# Patient Record
Sex: MALE | Race: BLACK OR AFRICAN AMERICAN | NOT HISPANIC OR LATINO | URBAN - METROPOLITAN AREA
[De-identification: names, ages, dates, MRNs, and addresses within clinical notes are randomized per-mention and may not be internally consistent; named-entity substitution may affect disease eponyms.]

---

## 2017-05-15 ENCOUNTER — INPATIENT (INPATIENT)
Facility: HOSPITAL | Age: 57
LOS: 1 days | Discharge: HOME | DRG: 639 | End: 2017-05-17
Attending: HOSPITALIST | Admitting: HOSPITALIST
Payer: COMMERCIAL

## 2017-05-15 VITALS
TEMPERATURE: 97 F | OXYGEN SATURATION: 100 % | SYSTOLIC BLOOD PRESSURE: 134 MMHG | DIASTOLIC BLOOD PRESSURE: 85 MMHG | HEART RATE: 70 BPM | RESPIRATION RATE: 20 BRPM | HEIGHT: 66 IN | WEIGHT: 194.01 LBS

## 2017-05-15 LAB
ACETONE SERPL-MCNC: ABNORMAL
ALBUMIN SERPL ELPH-MCNC: 4.3 G/DL — SIGNIFICANT CHANGE UP (ref 3.3–5.2)
ALP SERPL-CCNC: 69 U/L — SIGNIFICANT CHANGE UP (ref 40–120)
ALT FLD-CCNC: 21 U/L — SIGNIFICANT CHANGE UP
ANION GAP SERPL CALC-SCNC: 16 MMOL/L — SIGNIFICANT CHANGE UP (ref 5–17)
APPEARANCE UR: CLEAR — SIGNIFICANT CHANGE UP
AST SERPL-CCNC: 26 U/L — SIGNIFICANT CHANGE UP
BASE EXCESS BLDV CALC-SCNC: -4.7 MMOL/L — LOW (ref -3–3)
BASOPHILS # BLD AUTO: 0 K/UL — SIGNIFICANT CHANGE UP (ref 0–0.2)
BASOPHILS NFR BLD AUTO: 0.4 % — SIGNIFICANT CHANGE UP (ref 0–2)
BILIRUB SERPL-MCNC: 1.9 MG/DL — SIGNIFICANT CHANGE UP (ref 0.4–2)
BILIRUB UR-MCNC: NEGATIVE — SIGNIFICANT CHANGE UP
BUN SERPL-MCNC: 43 MG/DL — HIGH (ref 8–20)
CALCIUM SERPL-MCNC: 9.7 MG/DL — SIGNIFICANT CHANGE UP (ref 8.6–10.2)
CHLORIDE SERPL-SCNC: 93 MMOL/L — LOW (ref 98–107)
CO2 SERPL-SCNC: 19 MMOL/L — LOW (ref 22–29)
COLOR SPEC: YELLOW — SIGNIFICANT CHANGE UP
CREAT SERPL-MCNC: 1.47 MG/DL — HIGH (ref 0.5–1.3)
DIFF PNL FLD: NEGATIVE — SIGNIFICANT CHANGE UP
EOSINOPHIL # BLD AUTO: 0.1 K/UL — SIGNIFICANT CHANGE UP (ref 0–0.5)
EOSINOPHIL NFR BLD AUTO: 1.8 % — SIGNIFICANT CHANGE UP (ref 0–5)
GAS PNL BLDV: SIGNIFICANT CHANGE UP
GLUCOSE SERPL-MCNC: 612 MG/DL — CRITICAL HIGH (ref 70–115)
GLUCOSE UR QL: 1000 MG/DL
HCO3 BLDV-SCNC: 20 MMOL/L — SIGNIFICANT CHANGE UP (ref 20–26)
HCT VFR BLD CALC: 37.4 % — LOW (ref 42–52)
HGB BLD-MCNC: 14 G/DL — SIGNIFICANT CHANGE UP (ref 14–18)
KETONES UR-MCNC: ABNORMAL
LEUKOCYTE ESTERASE UR-ACNC: NEGATIVE — SIGNIFICANT CHANGE UP
LYMPHOCYTES # BLD AUTO: 1.2 K/UL — SIGNIFICANT CHANGE UP (ref 1–4.8)
LYMPHOCYTES # BLD AUTO: 22.2 % — SIGNIFICANT CHANGE UP (ref 20–55)
MCHC RBC-ENTMCNC: 30.8 PG — SIGNIFICANT CHANGE UP (ref 27–31)
MCHC RBC-ENTMCNC: 37.4 G/DL — HIGH (ref 32–36)
MCV RBC AUTO: 82.4 FL — SIGNIFICANT CHANGE UP (ref 80–94)
MONOCYTES # BLD AUTO: 0.2 K/UL — SIGNIFICANT CHANGE UP (ref 0–0.8)
MONOCYTES NFR BLD AUTO: 4.5 % — SIGNIFICANT CHANGE UP (ref 3–10)
NEUTROPHILS # BLD AUTO: 3.9 K/UL — SIGNIFICANT CHANGE UP (ref 1.8–8)
NEUTROPHILS NFR BLD AUTO: 70.7 % — SIGNIFICANT CHANGE UP (ref 37–73)
NITRITE UR-MCNC: NEGATIVE — SIGNIFICANT CHANGE UP
NT-PROBNP SERPL-SCNC: 47 PG/ML — SIGNIFICANT CHANGE UP (ref 0–300)
PCO2 BLDV: 49 MMHG — SIGNIFICANT CHANGE UP (ref 35–50)
PH BLDV: 7.27 — LOW (ref 7.35–7.45)
PH UR: 5 — SIGNIFICANT CHANGE UP (ref 5–8)
PLATELET # BLD AUTO: 139 K/UL — LOW (ref 150–400)
PO2 BLDV: 44 MMHG — SIGNIFICANT CHANGE UP (ref 25–45)
POTASSIUM SERPL-MCNC: 3.7 MMOL/L — SIGNIFICANT CHANGE UP (ref 3.5–5.3)
POTASSIUM SERPL-MCNC: 6.3 MMOL/L — CRITICAL HIGH (ref 3.5–5.3)
POTASSIUM SERPL-SCNC: 3.7 MMOL/L — SIGNIFICANT CHANGE UP (ref 3.5–5.3)
POTASSIUM SERPL-SCNC: 6.3 MMOL/L — CRITICAL HIGH (ref 3.5–5.3)
PROT SERPL-MCNC: 7.6 G/DL — SIGNIFICANT CHANGE UP (ref 6.6–8.7)
PROT UR-MCNC: NEGATIVE MG/DL — SIGNIFICANT CHANGE UP
RBC # BLD: 4.54 M/UL — LOW (ref 4.6–6.2)
RBC # FLD: 13.1 % — SIGNIFICANT CHANGE UP (ref 11–15.6)
SAO2 % BLDV: 70 % — SIGNIFICANT CHANGE UP (ref 67–88)
SODIUM SERPL-SCNC: 128 MMOL/L — LOW (ref 135–145)
SP GR SPEC: 1.01 — SIGNIFICANT CHANGE UP (ref 1.01–1.02)
TROPONIN T SERPL-MCNC: <0.01 NG/ML — SIGNIFICANT CHANGE UP (ref 0–0.06)
UROBILINOGEN FLD QL: NEGATIVE MG/DL — SIGNIFICANT CHANGE UP
WBC # BLD: 5.5 K/UL — SIGNIFICANT CHANGE UP (ref 4.8–10.8)
WBC # FLD AUTO: 5.5 K/UL — SIGNIFICANT CHANGE UP (ref 4.8–10.8)

## 2017-05-15 PROCEDURE — 93010 ELECTROCARDIOGRAM REPORT: CPT

## 2017-05-15 PROCEDURE — 71020: CPT | Mod: 26

## 2017-05-15 RX ORDER — INSULIN HUMAN 100 [IU]/ML
8 INJECTION, SOLUTION SUBCUTANEOUS ONCE
Qty: 0 | Refills: 0 | Status: COMPLETED | OUTPATIENT
Start: 2017-05-15 | End: 2017-05-15

## 2017-05-15 RX ORDER — METHOCARBAMOL 500 MG/1
1500 TABLET, FILM COATED ORAL ONCE
Qty: 0 | Refills: 0 | Status: COMPLETED | OUTPATIENT
Start: 2017-05-15 | End: 2017-05-15

## 2017-05-15 RX ORDER — SODIUM CHLORIDE 9 MG/ML
2000 INJECTION INTRAMUSCULAR; INTRAVENOUS; SUBCUTANEOUS ONCE
Qty: 0 | Refills: 0 | Status: COMPLETED | OUTPATIENT
Start: 2017-05-15 | End: 2017-05-15

## 2017-05-15 RX ORDER — INSULIN HUMAN 100 [IU]/ML
10 INJECTION, SOLUTION SUBCUTANEOUS ONCE
Qty: 0 | Refills: 0 | Status: COMPLETED | OUTPATIENT
Start: 2017-05-15 | End: 2017-05-15

## 2017-05-15 RX ORDER — SODIUM CHLORIDE 9 MG/ML
200 INJECTION INTRAMUSCULAR; INTRAVENOUS; SUBCUTANEOUS ONCE
Qty: 0 | Refills: 0 | Status: COMPLETED | OUTPATIENT
Start: 2017-05-15 | End: 2017-05-15

## 2017-05-15 RX ADMIN — METHOCARBAMOL 1500 MILLIGRAM(S): 500 TABLET, FILM COATED ORAL at 22:17

## 2017-05-15 RX ADMIN — INSULIN HUMAN 10 UNIT(S): 100 INJECTION, SOLUTION SUBCUTANEOUS at 20:27

## 2017-05-15 RX ADMIN — SODIUM CHLORIDE 100 MILLILITER(S): 9 INJECTION INTRAMUSCULAR; INTRAVENOUS; SUBCUTANEOUS at 20:30

## 2017-05-15 RX ADMIN — INSULIN HUMAN 8 UNIT(S): 100 INJECTION, SOLUTION SUBCUTANEOUS at 22:40

## 2017-05-15 NOTE — ED PROVIDER NOTE - PROGRESS NOTE DETAILS
I spoke with Dr. Lombardi re: patient who confirms that patient was on metformin in the past but it was discontinued as his A1cs have persistently been in the low 5's. I was able to obtain copies of old EKGs on MUSE and patient has persistent TWI on prior EKGs.. Plan to still repeat pt with elevated glucose mild dka hospitlist requested critical are to see pt accepted to them

## 2017-05-15 NOTE — ED PROVIDER NOTE - OBJECTIVE STATEMENT
56M with h/o diabetes previously on metformin but none x 2 years due to improvement, pw 4 days of weakness, sent from Dr. lombardi's office after FSG read "HI" and patient found to have ketones in urine.  He denies fevers/chills. Has a slight tickle in the throat s/p polyp removal recently ( benign), and denies abdominal pain/ vomiting/diarrhea. 56M with h/o diabetes previously on metformin but none x 2 years due to improvement, pw 4 days of weakness, sent from Dr. lombardi's office after FSG read "HI" and patient found to have ketones in urine.  He denies fevers/chills. Has a slight tickle in the throat s/p polyp removal recently ( benign), and denies abdominal pain/ vomiting/diarrhea.  Patient denies any current SOB but does note new onset STEVENS for the last week mostly with vigorous activity. NO travel/ LE edema 56M with h/o diabetes previously on metformin but none x 2 years due to improvement, pw 4 days of weakness, sent from Dr. lombardi's office after FSG read "HI" and patient found to have ketones in urine.  He denies fevers/chills. Has a slight tickle in the throat s/p polyp removal recently ( benign), and denies abdominal pain/ vomiting/diarrhea.  Patient denies any current SOB but does note new onset STEVENS for the last week mostly with vigorous activity. NO travel/ LE edema.  Pt reports recent cardiac clearance for polypectomy with DR. Granados in [place].

## 2017-05-15 NOTE — ED ADULT TRIAGE NOTE - CHIEF COMPLAINT QUOTE
Patient arrived to ED today from Dr. Lombardi's office.  Dr. Lombardi called and stated that patient is SOB, weak, hyperglycemic, has polyuria, + Ketones in his blood, and the in office blood sugar read HI indicating and blood sugar of over 500.

## 2017-05-15 NOTE — ED ADULT NURSE NOTE - OBJECTIVE STATEMENT
Patient states that he was at Dr. Lombardis office and was sent here because they checked his sugar and it read HI. He states that he went to the Drs office originally because he wasn't feeling well. He states that he has had shortness of breath with exertion and he has been unable to do the activities that he has normally been able to do. Respirations even and unlabored at this time, lung sounds clear bilat, patient denies any complaints of chest pain at this time.

## 2017-05-16 DIAGNOSIS — Z29.9 ENCOUNTER FOR PROPHYLACTIC MEASURES, UNSPECIFIED: ICD-10-CM

## 2017-05-16 DIAGNOSIS — R94.31 ABNORMAL ELECTROCARDIOGRAM [ECG] [EKG]: ICD-10-CM

## 2017-05-16 DIAGNOSIS — E87.6 HYPOKALEMIA: ICD-10-CM

## 2017-05-16 DIAGNOSIS — E11.65 TYPE 2 DIABETES MELLITUS WITH HYPERGLYCEMIA: ICD-10-CM

## 2017-05-16 DIAGNOSIS — E13.10 OTHER SPECIFIED DIABETES MELLITUS WITH KETOACIDOSIS WITHOUT COMA: ICD-10-CM

## 2017-05-16 DIAGNOSIS — N18.3 CHRONIC KIDNEY DISEASE, STAGE 3 (MODERATE): ICD-10-CM

## 2017-05-16 LAB
ACETONE SERPL-MCNC: ABNORMAL
ANION GAP SERPL CALC-SCNC: 11 MMOL/L — SIGNIFICANT CHANGE UP (ref 5–17)
ANION GAP SERPL CALC-SCNC: 13 MMOL/L — SIGNIFICANT CHANGE UP (ref 5–17)
ANION GAP SERPL CALC-SCNC: 16 MMOL/L — SIGNIFICANT CHANGE UP (ref 5–17)
BASE EXCESS BLDV CALC-SCNC: -6.4 MMOL/L — LOW (ref -3–3)
BUN SERPL-MCNC: 31 MG/DL — HIGH (ref 8–20)
BUN SERPL-MCNC: 37 MG/DL — HIGH (ref 8–20)
BUN SERPL-MCNC: 37 MG/DL — HIGH (ref 8–20)
CA-I SERPL-SCNC: 1.16 MMOL/L — SIGNIFICANT CHANGE UP (ref 1.15–1.29)
CALCIUM SERPL-MCNC: 7.7 MG/DL — LOW (ref 8.6–10.2)
CALCIUM SERPL-MCNC: 8 MG/DL — LOW (ref 8.6–10.2)
CALCIUM SERPL-MCNC: 8.4 MG/DL — LOW (ref 8.6–10.2)
CHLORIDE BLDV-SCNC: 108 MMOL/L — HIGH (ref 98–106)
CHLORIDE SERPL-SCNC: 100 MMOL/L — SIGNIFICANT CHANGE UP (ref 98–107)
CHLORIDE SERPL-SCNC: 100 MMOL/L — SIGNIFICANT CHANGE UP (ref 98–107)
CHLORIDE SERPL-SCNC: 105 MMOL/L — SIGNIFICANT CHANGE UP (ref 98–107)
CO2 SERPL-SCNC: 19 MMOL/L — LOW (ref 22–29)
CO2 SERPL-SCNC: 19 MMOL/L — LOW (ref 22–29)
CO2 SERPL-SCNC: 21 MMOL/L — LOW (ref 22–29)
CREAT SERPL-MCNC: 1.57 MG/DL — HIGH (ref 0.5–1.3)
CREAT SERPL-MCNC: 1.59 MG/DL — HIGH (ref 0.5–1.3)
CREAT SERPL-MCNC: 1.74 MG/DL — HIGH (ref 0.5–1.3)
CULTURE RESULTS: NO GROWTH — SIGNIFICANT CHANGE UP
GAS PNL BLDV: 134 MMOL/L — LOW (ref 136–146)
GAS PNL BLDV: SIGNIFICANT CHANGE UP
GAS PNL BLDV: SIGNIFICANT CHANGE UP
GLUCOSE BLDV-MCNC: 222 MG/DL — HIGH (ref 70–99)
GLUCOSE SERPL-MCNC: 139 MG/DL — HIGH (ref 70–115)
GLUCOSE SERPL-MCNC: 226 MG/DL — HIGH (ref 70–115)
GLUCOSE SERPL-MCNC: 350 MG/DL — HIGH (ref 70–115)
HBA1C BLD-MCNC: 10.9 % — HIGH (ref 4–5.6)
HCO3 BLDV-SCNC: 19 MMOL/L — LOW (ref 20–26)
HCT VFR BLDA CALC: 35 — LOW (ref 39–50)
HGB BLD CALC-MCNC: 11.4 G/DL — LOW (ref 13–17)
LACTATE BLDV-MCNC: 1.8 MMOL/L — SIGNIFICANT CHANGE UP (ref 0.5–2)
MAGNESIUM SERPL-MCNC: 2.3 MG/DL — SIGNIFICANT CHANGE UP (ref 1.6–2.6)
MAGNESIUM SERPL-MCNC: 2.4 MG/DL — SIGNIFICANT CHANGE UP (ref 1.6–2.6)
OTHER CELLS CSF MANUAL: 10 ML/DL — LOW (ref 18–22)
PCO2 BLDV: 45 MMHG — SIGNIFICANT CHANGE UP (ref 35–50)
PH BLDV: 7.27 — LOW (ref 7.35–7.45)
PHOSPHATE SERPL-MCNC: 2.9 MG/DL — SIGNIFICANT CHANGE UP (ref 2.4–4.7)
PHOSPHATE SERPL-MCNC: 3.6 MG/DL — SIGNIFICANT CHANGE UP (ref 2.4–4.7)
PO2 BLDV: 44 MMHG — SIGNIFICANT CHANGE UP (ref 25–45)
POTASSIUM BLDV-SCNC: 4.2 MMOL/L — SIGNIFICANT CHANGE UP (ref 3.4–4.5)
POTASSIUM SERPL-MCNC: 3.3 MMOL/L — LOW (ref 3.5–5.3)
POTASSIUM SERPL-MCNC: 3.9 MMOL/L — SIGNIFICANT CHANGE UP (ref 3.5–5.3)
POTASSIUM SERPL-MCNC: 4.4 MMOL/L — SIGNIFICANT CHANGE UP (ref 3.5–5.3)
POTASSIUM SERPL-SCNC: 3.3 MMOL/L — LOW (ref 3.5–5.3)
POTASSIUM SERPL-SCNC: 3.9 MMOL/L — SIGNIFICANT CHANGE UP (ref 3.5–5.3)
POTASSIUM SERPL-SCNC: 4.4 MMOL/L — SIGNIFICANT CHANGE UP (ref 3.5–5.3)
SAO2 % BLDV: 68 % — SIGNIFICANT CHANGE UP (ref 67–88)
SODIUM SERPL-SCNC: 132 MMOL/L — LOW (ref 135–145)
SODIUM SERPL-SCNC: 135 MMOL/L — SIGNIFICANT CHANGE UP (ref 135–145)
SODIUM SERPL-SCNC: 137 MMOL/L — SIGNIFICANT CHANGE UP (ref 135–145)
SPECIMEN SOURCE: SIGNIFICANT CHANGE UP

## 2017-05-16 PROCEDURE — 99233 SBSQ HOSP IP/OBS HIGH 50: CPT

## 2017-05-16 PROCEDURE — 99285 EMERGENCY DEPT VISIT HI MDM: CPT

## 2017-05-16 RX ORDER — SODIUM CHLORIDE 9 MG/ML
1000 INJECTION, SOLUTION INTRAVENOUS
Qty: 0 | Refills: 0 | Status: DISCONTINUED | OUTPATIENT
Start: 2017-05-16 | End: 2017-05-16

## 2017-05-16 RX ORDER — INSULIN GLARGINE 100 [IU]/ML
15 INJECTION, SOLUTION SUBCUTANEOUS ONCE
Qty: 0 | Refills: 0 | Status: COMPLETED | OUTPATIENT
Start: 2017-05-16 | End: 2017-05-16

## 2017-05-16 RX ORDER — INSULIN GLARGINE 100 [IU]/ML
22 INJECTION, SOLUTION SUBCUTANEOUS ONCE
Qty: 0 | Refills: 0 | Status: DISCONTINUED | OUTPATIENT
Start: 2017-05-16 | End: 2017-05-16

## 2017-05-16 RX ORDER — GLUCAGON INJECTION, SOLUTION 0.5 MG/.1ML
1 INJECTION, SOLUTION SUBCUTANEOUS ONCE
Qty: 0 | Refills: 0 | Status: DISCONTINUED | OUTPATIENT
Start: 2017-05-16 | End: 2017-05-17

## 2017-05-16 RX ORDER — INSULIN LISPRO 100/ML
VIAL (ML) SUBCUTANEOUS
Qty: 0 | Refills: 0 | Status: DISCONTINUED | OUTPATIENT
Start: 2017-05-16 | End: 2017-05-17

## 2017-05-16 RX ORDER — SODIUM CHLORIDE 9 MG/ML
1000 INJECTION, SOLUTION INTRAVENOUS ONCE
Qty: 0 | Refills: 0 | Status: COMPLETED | OUTPATIENT
Start: 2017-05-16 | End: 2017-05-16

## 2017-05-16 RX ORDER — POTASSIUM CHLORIDE 20 MEQ
40 PACKET (EA) ORAL ONCE
Qty: 0 | Refills: 0 | Status: COMPLETED | OUTPATIENT
Start: 2017-05-16 | End: 2017-05-16

## 2017-05-16 RX ORDER — DEXTROSE 50 % IN WATER 50 %
25 SYRINGE (ML) INTRAVENOUS ONCE
Qty: 0 | Refills: 0 | Status: DISCONTINUED | OUTPATIENT
Start: 2017-05-16 | End: 2017-05-17

## 2017-05-16 RX ORDER — INSULIN GLARGINE 100 [IU]/ML
22 INJECTION, SOLUTION SUBCUTANEOUS EVERY MORNING
Qty: 0 | Refills: 0 | Status: DISCONTINUED | OUTPATIENT
Start: 2017-05-17 | End: 2017-05-17

## 2017-05-16 RX ORDER — DEXTROSE 50 % IN WATER 50 %
12.5 SYRINGE (ML) INTRAVENOUS ONCE
Qty: 0 | Refills: 0 | Status: DISCONTINUED | OUTPATIENT
Start: 2017-05-16 | End: 2017-05-17

## 2017-05-16 RX ORDER — SODIUM CHLORIDE 9 MG/ML
1000 INJECTION, SOLUTION INTRAVENOUS
Qty: 0 | Refills: 0 | Status: DISCONTINUED | OUTPATIENT
Start: 2017-05-16 | End: 2017-05-17

## 2017-05-16 RX ORDER — INSULIN HUMAN 100 [IU]/ML
7 INJECTION, SOLUTION SUBCUTANEOUS
Qty: 100 | Refills: 0 | Status: DISCONTINUED | OUTPATIENT
Start: 2017-05-16 | End: 2017-05-16

## 2017-05-16 RX ORDER — INSULIN LISPRO 100/ML
VIAL (ML) SUBCUTANEOUS AT BEDTIME
Qty: 0 | Refills: 0 | Status: DISCONTINUED | OUTPATIENT
Start: 2017-05-16 | End: 2017-05-17

## 2017-05-16 RX ORDER — PANTOPRAZOLE SODIUM 20 MG/1
40 TABLET, DELAYED RELEASE ORAL DAILY
Qty: 0 | Refills: 0 | Status: DISCONTINUED | OUTPATIENT
Start: 2017-05-16 | End: 2017-05-16

## 2017-05-16 RX ORDER — DEXTROSE 50 % IN WATER 50 %
1 SYRINGE (ML) INTRAVENOUS ONCE
Qty: 0 | Refills: 0 | Status: DISCONTINUED | OUTPATIENT
Start: 2017-05-16 | End: 2017-05-17

## 2017-05-16 RX ORDER — KETOROLAC TROMETHAMINE 30 MG/ML
30 SYRINGE (ML) INJECTION ONCE
Qty: 0 | Refills: 0 | Status: DISCONTINUED | OUTPATIENT
Start: 2017-05-16 | End: 2017-05-16

## 2017-05-16 RX ORDER — ENOXAPARIN SODIUM 100 MG/ML
40 INJECTION SUBCUTANEOUS EVERY 24 HOURS
Qty: 0 | Refills: 0 | Status: DISCONTINUED | OUTPATIENT
Start: 2017-05-16 | End: 2017-05-17

## 2017-05-16 RX ADMIN — Medication 40 MILLIEQUIVALENT(S): at 13:56

## 2017-05-16 RX ADMIN — Medication 4: at 22:23

## 2017-05-16 RX ADMIN — Medication 30 MILLIGRAM(S): at 00:42

## 2017-05-16 RX ADMIN — INSULIN HUMAN 3 UNIT(S)/HR: 100 INJECTION, SOLUTION SUBCUTANEOUS at 05:53

## 2017-05-16 RX ADMIN — INSULIN GLARGINE 15 UNIT(S): 100 INJECTION, SOLUTION SUBCUTANEOUS at 05:54

## 2017-05-16 RX ADMIN — SODIUM CHLORIDE 200 MILLILITER(S): 9 INJECTION, SOLUTION INTRAVENOUS at 05:53

## 2017-05-16 RX ADMIN — Medication 30 MILLIGRAM(S): at 00:57

## 2017-05-16 RX ADMIN — SODIUM CHLORIDE 2000 MILLILITER(S): 9 INJECTION, SOLUTION INTRAVENOUS at 07:57

## 2017-05-16 RX ADMIN — ENOXAPARIN SODIUM 40 MILLIGRAM(S): 100 INJECTION SUBCUTANEOUS at 13:56

## 2017-05-16 RX ADMIN — SODIUM CHLORIDE 1000 MILLILITER(S): 9 INJECTION INTRAMUSCULAR; INTRAVENOUS; SUBCUTANEOUS at 00:20

## 2017-05-16 NOTE — ADVANCED PRACTICE NURSE CONSULT - ASSESSMENT
pt is a+ox3 c/o 0 pain resting comfortably in bed. family @ bedside providing comfort. pt states that 2013 he was hospitalized and was started on metformin, but after awhile his dr told him he does nt need it anymore. in the last few months he had very hectic life but the last week he was feeling worst. he saw his dr and was sent to the hospital. pt was educated about the result of hemoglobin a1c and the increased risk of developing long term complications and the importance of adding insulin @ this time. pt does not like the idea of insulin and injection and preferrs pills. explained that @ this time due to the high # there is a need for insulin. pt has a countur glucometer wife agreed to bring pts glucometer to the hospital. pt agreed to draw and inject all insulin doses while in the hospital using insulin syringe and rn supervision.

## 2017-05-16 NOTE — H&P ADULT - NSHPREVIEWOFSYSTEMS_GEN_ALL_CORE
Review of Systems:  CONSTITUTIONAL: No fever, chills, (+) fatigue  EYES: No eye pain, visual disturbances, or discharge  ENMT:  No difficulty hearing, tinnitus, vertigo; No sinus or throat pain  NECK: No pain or stiffness  RESPIRATORY: No cough, wheezing, chills or hemoptysis; No shortness of breath  CARDIOVASCULAR: No chest pain, palpitations, dizziness, or leg swelling  GASTROINTESTINAL: (+)  abdominal. No nausea, vomiting, or hematemesis; No diarrhea or constipation. No melena or hematochezia.  GENITOURINARY: (+) polyuria, No  dysuria, hematuria, or incontinence  NEUROLOGICAL: No headaches, memory loss, loss of strength, numbness, or tremors  SKIN: No itching, burning, rashes, or lesions   MUSCULOSKELETAL: No joint pain or swelling; No muscle, back, or extremity pain (+) Leg cramping   PSYCHIATRIC: No depression, anxiety, mood swings, or difficulty sleeping

## 2017-05-16 NOTE — ED ADULT NURSE REASSESSMENT NOTE - NS ED NURSE REASSESS COMMENT FT1
ICU Satinder Mobley Love at patients bedside
patient began complaining of cramping to bilateral lower legs, crawling on floor and unable to sit still. Dr. Luque made aware
patient c/o pain to his lower legs. States that he feels that they are cramping again. Dr. Lorenzana made aware will put order for medication

## 2017-05-16 NOTE — PROGRESS NOTE ADULT - PROBLEM SELECTOR PLAN 1
Long acting insulin with Lantus started  Endocrine Consult  Diabetes Educator consult  Accuchecks AC/HS  ISS  Consistent carb diet

## 2017-05-16 NOTE — PROGRESS NOTE ADULT - SUBJECTIVE AND OBJECTIVE BOX
CC: Sent to ED by PMD for High blood glucose and urine dip stick positive for ketones (16 May 2017 05:50)    HPI:  56 year old male PMHx HTN, DM2. Pt had been on Metformin up to 2 years ago and was taken off by PMD for improving blood sugars secondary to diet and exercise.  Pt States over the past week - week and a half was tiring easily, experiencing increased thirst,  increased urination and weight loss.  Be began having cramps in feet and legs and tonight with weakness and abdominal pain which he saw PMD.  PMD  found his Finger stick was too high to register and had Ketone positive urine.  Pt denies any N/V SOB or CP. (16 May 2017 05:50)    INTERVAL HPI/OVERNIGHT EVENTS: Abdominal cramps, improved, some numbness B/L fingers/hands. Now off insulin drip.    Vital Signs Last 24 Hrs  T(C): 36.4, Max: 36.6 ( @ 02:39)  T(F): 97.5, Max: 97.9 ( @ 02:39)  HR: 65 (48 - 70)  BP: 114/74 (95/54 - 134/85)  BP(mean): 88 (69 - 88)  RR: 21 (13 - 28)  SpO2: 100% (99% - 100%)  I&O's Detail  I & Os for 24h ending 16 May 2017 07:00  =============================================    CM: no events    IN:    dextrose 5% + lactated ringers.: 350 ml    insulin Infusion: 10 ml    Total IN: 360 ml  ---------------------------------------------  OUT:    Voided: 250 ml    Total OUT: 250 ml  ---------------------------------------------  Total NET: 110 ml    I & Os for current day (as of 16 May 2017 14:22)  =============================================  IN:    0.9% NaCl: 1000 ml    dextrose 5% + lactated ringers.: 900 ml    insulin Infusion: 15 ml    insulin Infusion: 3 ml    Total IN: 1918 ml  ---------------------------------------------  OUT:    Voided: 400 ml    Total OUT: 400 ml  ---------------------------------------------  Total NET: 1518 ml      CARDIAC MARKERS ( 15 May 2017 20:17 )  x     / <0.01 ng/mL / x     / x     / x                                14.0   5.5   )-----------( 139      ( 15 May 2017 20:17 )             37.4     16 May 2017 12:18    137    |  105    |  31.0   ----------------------------<  139    3.3     |  21.0   |  1.57     Ca    7.7        16 May 2017 12:18  Phos  2.9       16 May 2017 12:18  Mg     2.4       16 May 2017 12:18    TPro  7.6    /  Alb  4.3    /  TBili  1.9    /  DBili  x      /  AST  26     /  ALT  21     /  AlkPhos  69     15 May 2017 20:17      CAPILLARY BLOOD GLUCOSE  94 (16 May 2017 13:00)  108 (16 May 2017 12:00)  148 (16 May 2017 11:00)  227 (16 May 2017 10:00)  271 (16 May 2017 09:00)  308 (16 May 2017 08:00)  310 (16 May 2017 07:00)  295 (16 May 2017 06:11)  304 (16 May 2017 02:39)  234 (15 May 2017 23:41)  345 (15 May 2017 21:39)    LIVER FUNCTIONS - ( 15 May 2017 20:17 )  Alb: 4.3 g/dL / Pro: 7.6 g/dL / ALK PHOS: 69 U/L / ALT: 21 U/L / AST: 26 U/L / GGT: x           Urinalysis Basic - ( 15 May 2017 20:35 )    Color: Yellow / Appearance: Clear / S.010 / pH: x  Gluc: x / Ketone: Trace  / Bili: Negative / Urobili: Negative mg/dL   Blood: x / Protein: Negative mg/dL / Nitrite: Negative   Leuk Esterase: Negative / RBC: x / WBC x   Sq Epi: x / Non Sq Epi: x / Bacteria: x      Hemoglobin A1C, Whole Blood: 10.9 % ( @ 07:10)    MEDICATIONS  (STANDING):  enoxaparin Injectable 40milliGRAM(s) SubCutaneous every 24 hours  insulin glargine Injectable (LANTUS) 22Unit(s) SubCutaneous once  insulin lispro (HumaLOG) corrective regimen sliding scale  SubCutaneous three times a day before meals  insulin lispro (HumaLOG) corrective regimen sliding scale  SubCutaneous at bedtime  dextrose 5%. 1000milliLiter(s) IV Continuous <Continuous>  dextrose 50% Injectable 12.5Gram(s) IV Push once  dextrose 50% Injectable 25Gram(s) IV Push once  dextrose 50% Injectable 25Gram(s) IV Push once    MEDICATIONS  (PRN):  dextrose Gel 1Dose(s) Oral once PRN Blood Glucose LESS THAN 70 milliGRAM(s)/deciLiter  glucagon  Injectable 1milliGRAM(s) IntraMuscular once PRN Glucose <70 milliGRAM(s)/deciLiter      RADIOLOGY & ADDITIONAL TESTS:    ROS: + abdominal cramping, +muscle cramping, +numbness/tingling upper extremities   Constitutional, Eyes, ENT, Cardiovascular, Respiratory,  Genitourinary, Integumentary, Psychiatric, Endocrine, Heme/Lymph are otherwise negative.    Physical Exam: Physical Examination:    General: No acute distress.  Alert, oriented, interactive, nonfocal    HEENT: Pupils equal, reactive to light.  Symmetric.    PULM: Clear to auscultation bilaterally, no significant sputum production    CVS: Regular rate and rhythm, no murmurs, rubs, or gallops    ABD: Soft, nondistended, mild - tenderness, normoactive bowel sounds, no masses    EXT: No edema, nontender    SKIN: Warm and well perfused, no rashes noted.

## 2017-05-16 NOTE — H&P ADULT - PROBLEM SELECTOR PLAN 1
Insulin infusion until anion gap <= 12  Long acting insulin with Lantus   IVF Buffered [id number]   Serial lytes   NPO   GI / DVT PPX

## 2017-05-16 NOTE — H&P ADULT - NSHPPHYSICALEXAM_GEN_ALL_CORE
Physical Examination:    General: No acute distress.  Alert, oriented, interactive, nonfocal    HEENT: Pupils equal, reactive to light.  Symmetric.    PULM: Clear to auscultation bilaterally, no significant sputum production    CVS: Regular rate and rhythm, no murmurs, rubs, or gallops    ABD: Soft, nondistended, mild - tenderness, normoactive bowel sounds, no masses    EXT: No edema, nontender    SKIN: Warm and well perfused, no rashes noted.

## 2017-05-16 NOTE — PROGRESS NOTE ADULT - ASSESSMENT
56 year old male PMHx HTN, DM2. Pt had been on Metformin up to 2 years ago and was taken off by PMD for improving blood sugars secondary to diet and exercise.  Patient reports being admitted for similar episode in 2013 (BG 1500? as per patient) Pt States over the past week - week and a half was tiring easily, experiencing increased thirst,  increased urination and weight loss.  Admitted with DKA, now off insulin drip.

## 2017-05-16 NOTE — H&P ADULT - HISTORY OF PRESENT ILLNESS
56 year old male PMHx HTN, DM2. Pt had been on Metformin up to 2 years ago and was taken off by PMD for improving blood sugars secondary to diet and exercise.  Pt States over the past week - week and a half was tiring easily, experiencing increased thirst,  increased urination and weight loss.  Be began having cramps in feet and legs and tonight with weakness and abdominal pain which he saw PMD.  PMD  found his Finger stick was too high to register and had Ketone positive urine.  Pt denies any N/V SOB or CP.

## 2017-05-16 NOTE — ADVANCED PRACTICE NURSE CONSULT - RECOMMEDATIONS
continue diabetes self management education  pt to draw and inject all insulin doses while in the hospital using insulin syringe and rn supervision  family to bring pts glucometer  c peptide

## 2017-05-16 NOTE — H&P ADULT - ATTENDING COMMENTS
I have seen and examined se    DKA resolved, will need insulin tx endo consult diabetic education  stable fro transfer to the floor  start diet

## 2017-05-17 VITALS
RESPIRATION RATE: 18 BRPM | SYSTOLIC BLOOD PRESSURE: 118 MMHG | TEMPERATURE: 98 F | OXYGEN SATURATION: 100 % | DIASTOLIC BLOOD PRESSURE: 71 MMHG | HEART RATE: 61 BPM

## 2017-05-17 DIAGNOSIS — E10.21 TYPE 1 DIABETES MELLITUS WITH DIABETIC NEPHROPATHY: ICD-10-CM

## 2017-05-17 LAB
ANION GAP SERPL CALC-SCNC: 9 MMOL/L — SIGNIFICANT CHANGE UP (ref 5–17)
BUN SERPL-MCNC: 17 MG/DL — SIGNIFICANT CHANGE UP (ref 8–20)
CALCIUM SERPL-MCNC: 8.9 MG/DL — SIGNIFICANT CHANGE UP (ref 8.6–10.2)
CHLORIDE SERPL-SCNC: 105 MMOL/L — SIGNIFICANT CHANGE UP (ref 98–107)
CO2 SERPL-SCNC: 22 MMOL/L — SIGNIFICANT CHANGE UP (ref 22–29)
CREAT SERPL-MCNC: 1.35 MG/DL — HIGH (ref 0.5–1.3)
GLUCOSE SERPL-MCNC: 292 MG/DL — HIGH (ref 70–115)
HCT VFR BLD CALC: 33.1 % — LOW (ref 42–52)
HGB BLD-MCNC: 11.8 G/DL — LOW (ref 14–18)
MAGNESIUM SERPL-MCNC: 2.1 MG/DL — SIGNIFICANT CHANGE UP (ref 1.6–2.6)
MCHC RBC-ENTMCNC: 30.1 PG — SIGNIFICANT CHANGE UP (ref 27–31)
MCHC RBC-ENTMCNC: 35.6 G/DL — SIGNIFICANT CHANGE UP (ref 32–36)
MCV RBC AUTO: 84.4 FL — SIGNIFICANT CHANGE UP (ref 80–94)
PHOSPHATE SERPL-MCNC: 2.5 MG/DL — SIGNIFICANT CHANGE UP (ref 2.4–4.7)
PLATELET # BLD AUTO: 116 K/UL — LOW (ref 150–400)
POTASSIUM SERPL-MCNC: 4.7 MMOL/L — SIGNIFICANT CHANGE UP (ref 3.5–5.3)
POTASSIUM SERPL-SCNC: 4.7 MMOL/L — SIGNIFICANT CHANGE UP (ref 3.5–5.3)
RBC # BLD: 3.92 M/UL — LOW (ref 4.6–6.2)
RBC # FLD: 13.3 % — SIGNIFICANT CHANGE UP (ref 11–15.6)
SODIUM SERPL-SCNC: 136 MMOL/L — SIGNIFICANT CHANGE UP (ref 135–145)
WBC # BLD: 3.9 K/UL — LOW (ref 4.8–10.8)
WBC # FLD AUTO: 3.9 K/UL — LOW (ref 4.8–10.8)

## 2017-05-17 PROCEDURE — 96374 THER/PROPH/DIAG INJ IV PUSH: CPT

## 2017-05-17 PROCEDURE — 82009 KETONE BODYS QUAL: CPT

## 2017-05-17 PROCEDURE — 93005 ELECTROCARDIOGRAM TRACING: CPT

## 2017-05-17 PROCEDURE — 71046 X-RAY EXAM CHEST 2 VIEWS: CPT

## 2017-05-17 PROCEDURE — 84132 ASSAY OF SERUM POTASSIUM: CPT

## 2017-05-17 PROCEDURE — 96376 TX/PRO/DX INJ SAME DRUG ADON: CPT

## 2017-05-17 PROCEDURE — 87086 URINE CULTURE/COLONY COUNT: CPT

## 2017-05-17 PROCEDURE — 83880 ASSAY OF NATRIURETIC PEPTIDE: CPT

## 2017-05-17 PROCEDURE — 83735 ASSAY OF MAGNESIUM: CPT

## 2017-05-17 PROCEDURE — 83036 HEMOGLOBIN GLYCOSYLATED A1C: CPT

## 2017-05-17 PROCEDURE — 84100 ASSAY OF PHOSPHORUS: CPT

## 2017-05-17 PROCEDURE — 82435 ASSAY OF BLOOD CHLORIDE: CPT

## 2017-05-17 PROCEDURE — 83605 ASSAY OF LACTIC ACID: CPT

## 2017-05-17 PROCEDURE — 80048 BASIC METABOLIC PNL TOTAL CA: CPT

## 2017-05-17 PROCEDURE — 99239 HOSP IP/OBS DSCHRG MGMT >30: CPT

## 2017-05-17 PROCEDURE — 36415 COLL VENOUS BLD VENIPUNCTURE: CPT

## 2017-05-17 PROCEDURE — 96375 TX/PRO/DX INJ NEW DRUG ADDON: CPT

## 2017-05-17 PROCEDURE — 80053 COMPREHEN METABOLIC PANEL: CPT

## 2017-05-17 PROCEDURE — 81003 URINALYSIS AUTO W/O SCOPE: CPT

## 2017-05-17 PROCEDURE — 84484 ASSAY OF TROPONIN QUANT: CPT

## 2017-05-17 PROCEDURE — 82330 ASSAY OF CALCIUM: CPT

## 2017-05-17 PROCEDURE — 93306 TTE W/DOPPLER COMPLETE: CPT

## 2017-05-17 PROCEDURE — 85014 HEMATOCRIT: CPT

## 2017-05-17 PROCEDURE — 82947 ASSAY GLUCOSE BLOOD QUANT: CPT

## 2017-05-17 PROCEDURE — 82803 BLOOD GASES ANY COMBINATION: CPT

## 2017-05-17 PROCEDURE — 84681 ASSAY OF C-PEPTIDE: CPT

## 2017-05-17 PROCEDURE — 84295 ASSAY OF SERUM SODIUM: CPT

## 2017-05-17 PROCEDURE — 85027 COMPLETE CBC AUTOMATED: CPT

## 2017-05-17 PROCEDURE — 99285 EMERGENCY DEPT VISIT HI MDM: CPT | Mod: 25

## 2017-05-17 RX ORDER — INSULIN DETEMIR 100/ML (3)
22 INSULIN PEN (ML) SUBCUTANEOUS
Qty: 2 | Refills: 0
Start: 2017-05-17 | End: 2017-06-16

## 2017-05-17 RX ORDER — INSULIN ASPART 100 [IU]/ML
4 INJECTION, SOLUTION SUBCUTANEOUS
Qty: 2 | Refills: 0
Start: 2017-05-17 | End: 2017-06-16

## 2017-05-17 RX ORDER — INSULIN DETEMIR 100/ML (3)
50 INSULIN PEN (ML) SUBCUTANEOUS
Qty: 0 | Refills: 0 | DISCHARGE
Start: 2017-05-17 | End: 2017-06-16

## 2017-05-17 RX ORDER — INSULIN DETEMIR 100/ML (3)
25 INSULIN PEN (ML) SUBCUTANEOUS
Qty: 0 | Refills: 0 | DISCHARGE
Start: 2017-05-17 | End: 2017-06-16

## 2017-05-17 RX ADMIN — INSULIN GLARGINE 22 UNIT(S): 100 INJECTION, SOLUTION SUBCUTANEOUS at 12:42

## 2017-05-17 RX ADMIN — Medication: at 12:43

## 2017-05-17 RX ADMIN — Medication: at 16:12

## 2017-05-17 RX ADMIN — Medication: at 07:53

## 2017-05-17 NOTE — DISCHARGE NOTE ADULT - CARE PLAN
Principal Discharge DX:	Diabetic ketoacidosis without coma associated with type 1 diabetes mellitus  Goal:	control blood control  Instructions for follow-up, activity and diet:	endocrinology follow up  Secondary Diagnosis:	CKD (chronic kidney disease) stage 3, GFR 30-59 ml/min  Secondary Diagnosis:	Essential hypertension

## 2017-05-17 NOTE — DISCHARGE NOTE ADULT - HOSPITAL COURSE
HPI:  56 year old male PMHx HTN, DM2. Pt had been on Metformin up to 2 years ago and was taken off by PMD for improving blood sugars secondary to diet and exercise.  Pt States over the past week - week and a half was tiring easily, experiencing increased thirst,  increased urination and weight loss.  Be began having cramps in feet and legs and tonight with weakness and abdominal pain which he saw PMD.  PMD  found his Finger stick was too high to register and had Ketone positive urine.  Pt denies any N/V SOB or CP. (16 May 2017 05:50)  patient treated for DKA with IVF and IV insulin drip, and now is doing well and is seen by endocrinology Dr peterson and Sally 22 units once a day and Novolog 4 units with dinner recommended HPI:  56 year old male PMHx HTN, DM2. Pt had been on Metformin up to 2 years ago and was taken off by PMD for improving blood sugars secondary to diet and exercise.  Pt States over the past week - week and a half was tiring easily, experiencing increased thirst,  increased urination and weight loss.  Be began having cramps in feet and legs and tonight with weakness and abdominal pain which he saw PMD.  PMD  found his Finger stick was too high to register and had Ketone positive urine.  Pt denies any N/V SOB or CP. (16 May 2017 05:50)  patient treated for DKA with IVF and IV insulin drip, and now is doing well and is seen by endocrinology Dr peterson and Sally 22 units once a day and Novolog 4 units with dinner recommended . discussed with PMD and endocrine and patient and his wife, he will follow up outpt and with renal for CRF,    GENERAL: NAD, well-groomed, well-developed  HEAD:  Atraumatic, Normocephalic  EYES: EOMI, PERRLA, conjunctiva and sclera clear  ENMT: No tonsillar erythema, exudates, or enlargement; Moist mucous membranes,   NECK: Supple, No JVD, Normal thyroid  NERVOUS SYSTEM:  Alert & Oriented X3, Good concentration; Motor Strength 5/5 B/L upper and lower extremities; CHEST/LUNG: Clear to percussion bilaterally; No rales, rhonchi, wheezing, or rubs  HEART: Regular rate and rhythm; No murmurs, rubs, or gallops  ABDOMEN: Soft, Nontender, Nondistended; Bowel sounds present  EXTREMITIES:  2+ Peripheral Pulses, No clubbing, cyanosis, or edema  LYMPH: No lymphadenopathy noted  SKIN: No rashes or lesions

## 2017-05-17 NOTE — DISCHARGE NOTE ADULT - MEDICATION SUMMARY - MEDICATIONS TO TAKE
I will START or STAY ON the medications listed below when I get home from the hospital:    Levemir 100 units/mL subcutaneous solution  -- 22 unit(s) subcutaneous once a day  -- Check with your doctor before becoming pregnant.  Do not drink alcoholic beverages when taking this medication.  Keep in refrigerator.  Do not freeze.    -- Indication: For Type 1 diabetes mellitus with nephropathy    NovoLOG 100 units/mL subcutaneous solution  -- 4 unit(s) subcutaneous once a day with dinner  -- Do not drink alcoholic beverages when taking this medication.  Keep in refrigerator.  Do not freeze.  Obtain medical advice before taking any non-prescription drugs as some may affect the action of this medication.    -- Indication: For Type 1 diabetes mellitus with nephropathy

## 2017-05-17 NOTE — ADVANCED PRACTICE NURSE CONSULT - RECOMMEDATIONS
continue diabetes self management education  pt to inject all insulin doses while in the hospital using prefilled insulin syringe and rn supervision  please send pt on levemir and novolog flex pens and jose carlos needles  pt needs perscription for countur glucometer continue diabetes self management education  pt to inject all insulin doses while in the hospital using prefilled insulin syringe and rn supervision  please send pt on levemir and novolog flex pens and jose carlos needles  pt needs perscription for countur glucometer  cc- pls task to out pt dwp

## 2017-05-17 NOTE — ADVANCED PRACTICE NURSE CONSULT - ASSESSMENT
return to see pt in am. pt is a+ox3c/o 0 pain resting comfortably in bed family @ bed side providing comfort.pt was educated about the 2 types of insulin he will be taking their different action and schedule. pt and family verbalized understanding. pt was able to return demonstration of insulin pen setting testing dosing and injection successfully, while maintaining hygien. reviewed ss of hypoglycemia and treatment pt verbalized understanding. pt's family brought his glucometer strips were  in . pt was educated about the importance of using strips with in date.

## 2017-05-17 NOTE — DISCHARGE NOTE ADULT - PATIENT PORTAL LINK FT
“You can access the Follow[facility] Patient Portal, offered by [facility], by registering with the following website: [email or web address]”

## 2017-05-17 NOTE — DISCHARGE NOTE ADULT - CARE PROVIDER_API CALL
Paula Ceja (DO), EndocrinologyMetabDiabetes; Internal Medicine  [address] [place] [address]  Phone: [phone]  Fax: [phone]    Lombardi, Adrian C (MD), Family Medicine  [address]  [place] [address]  Phone: [phone]  Fax: [phone]

## 2017-05-17 NOTE — CONSULT NOTE ADULT - ASSESSMENT
56 year old male with history of HTN and T2DM admitted with mild DKA (glucose 612).  He reported symptoms of polyuria, muscle cramps, fatigue with decreased ability to exercise, weight loss of 12-15 pounds over the past 3-4 weeks with glucoses reading "hi" on meter (he was also using  strips).  He was treated with IVF and insulin drip.  History suggestive of late onset T1DM (ABEBE). FS not well controlled but improved and Lantus dose increased today.  Pt is anxious to leave to attend grandson's graduation

## 2017-05-17 NOTE — CONSULT NOTE ADULT - PROBLEM SELECTOR RECOMMENDATION 9
- pt need new Rx for meter and testing supplies - lancets and test strips; he should test before meals and at bedtime  - cont Lantus 22 units (dose increased today)  - suggest Humalog 4 units premeals and cont sliding scale  - I reviewed with pt differences btw T1DM and T2DM.  He understand that at this time insulin is needed for rapid glucose control and to maintain good glycemic control. Cpeptide levels are pending. He agrees to take insulin as outpatient. We discussed risks/benefits of insulin therapy.  As he is travelling to [place] tomorrow - will avoid tight control at this time which can lead to dangerous hypoglycemia.  Outpt regimen: Levemir 22 units in am, Novolog 4 units with dinner, Novolog scale premeals only (151-200 2 units, 201-250 4 units, 251-300 6 units, 301+ 8 units)  - I advised that he follow up with me as outpt  - all questions answered  - more than 50% visit spent counseling pt - pt need new Rx for meter and testing supplies - lancets and test strips; he should test before meals and at bedtime  - cont Lantus 22 units (dose increased today)  - avoid MFN given renal insufficiency  - suggest Humalog 4 units premeals and cont sliding scale  - I reviewed with pt differences btw T1DM and T2DM.  He understand that at this time insulin is needed for rapid glucose control and to maintain good glycemic control. Cpeptide levels are pending. He agrees to take insulin as outpatient. We discussed risks/benefits of insulin therapy.  As he is travelling to [place] tomorrow - will avoid tight control at this time which can lead to dangerous hypoglycemia.  Outpt regimen: Levemir 22 units in am, Novolog 4 units with dinner, Novolog scale premeals only (151-200 2 units, 201-250 4 units, 251-300 6 units, 301+ 8 units)  - I advised that he follow up with me as outpt  - all questions answered  - more than 50% visit spent counseling pt

## 2017-05-18 LAB — C PEPTIDE SERPL-MCNC: 0.7 NG/ML — LOW (ref 0.9–7.1)

## 2017-06-06 PROBLEM — I10 ESSENTIAL (PRIMARY) HYPERTENSION: Chronic | Status: ACTIVE | Noted: 2017-05-15

## 2017-07-17 ENCOUNTER — APPOINTMENT (OUTPATIENT)
Dept: PULMONOLOGY | Facility: CLINIC | Age: 57
End: 2017-07-17

## 2017-07-17 VITALS — RESPIRATION RATE: 16 BRPM

## 2017-07-17 VITALS
HEART RATE: 46 BPM | OXYGEN SATURATION: 100 % | SYSTOLIC BLOOD PRESSURE: 118 MMHG | HEIGHT: 64 IN | BODY MASS INDEX: 33.46 KG/M2 | DIASTOLIC BLOOD PRESSURE: 70 MMHG | WEIGHT: 196 LBS

## 2017-07-17 RX ORDER — AZILSARTAN KAMEDOXOMIL AND CHLORTHALIDONE 40; 12.5 MG/1; MG/1
40-12.5 TABLET ORAL
Refills: 0 | Status: ACTIVE | COMMUNITY

## 2017-07-17 RX ORDER — INSULIN DETEMIR 100 [IU]/ML
100 INJECTION, SOLUTION SUBCUTANEOUS
Refills: 0 | Status: ACTIVE | COMMUNITY

## 2017-08-25 ENCOUNTER — OUTPATIENT (OUTPATIENT)
Dept: OUTPATIENT SERVICES | Facility: HOSPITAL | Age: 57
LOS: 1 days | End: 2017-08-25
Payer: COMMERCIAL

## 2017-08-25 DIAGNOSIS — G47.33 OBSTRUCTIVE SLEEP APNEA (ADULT) (PEDIATRIC): ICD-10-CM

## 2017-08-25 PROCEDURE — 95806 SLEEP STUDY UNATT&RESP EFFT: CPT

## 2017-08-25 PROCEDURE — 95806 SLEEP STUDY UNATT&RESP EFFT: CPT | Mod: 26

## 2017-09-25 ENCOUNTER — APPOINTMENT (OUTPATIENT)
Dept: PULMONOLOGY | Facility: CLINIC | Age: 57
End: 2017-09-25
Payer: COMMERCIAL

## 2017-09-25 VITALS
HEIGHT: 64 IN | OXYGEN SATURATION: 97 % | HEART RATE: 68 BPM | DIASTOLIC BLOOD PRESSURE: 70 MMHG | WEIGHT: 204 LBS | SYSTOLIC BLOOD PRESSURE: 118 MMHG | BODY MASS INDEX: 34.83 KG/M2

## 2017-09-25 VITALS — RESPIRATION RATE: 16 BRPM

## 2017-09-25 DIAGNOSIS — G47.33 OBSTRUCTIVE SLEEP APNEA (ADULT) (PEDIATRIC): ICD-10-CM

## 2017-09-25 PROCEDURE — 99214 OFFICE O/P EST MOD 30 MIN: CPT

## 2017-09-25 RX ORDER — INSULIN ASPART 100 [IU]/ML
100 INJECTION, SOLUTION INTRAVENOUS; SUBCUTANEOUS
Refills: 0 | Status: DISCONTINUED | COMMUNITY
End: 2017-09-25

## 2021-08-09 ENCOUNTER — FORM ENCOUNTER (OUTPATIENT)
Age: 61
End: 2021-08-09

## 2021-08-13 ENCOUNTER — INPATIENT (INPATIENT)
Facility: HOSPITAL | Age: 61
LOS: 9 days | Discharge: HOME | DRG: 177 | End: 2021-08-23
Attending: STUDENT IN AN ORGANIZED HEALTH CARE EDUCATION/TRAINING PROGRAM | Admitting: INTERNAL MEDICINE
Payer: COMMERCIAL

## 2021-08-13 VITALS
SYSTOLIC BLOOD PRESSURE: 80 MMHG | OXYGEN SATURATION: 94 % | TEMPERATURE: 99 F | RESPIRATION RATE: 18 BRPM | HEIGHT: 66 IN | WEIGHT: 186.07 LBS | HEART RATE: 68 BPM | DIASTOLIC BLOOD PRESSURE: 52 MMHG

## 2021-08-13 DIAGNOSIS — U07.1 COVID-19: ICD-10-CM

## 2021-08-13 LAB
ALBUMIN SERPL ELPH-MCNC: 2.7 G/DL — LOW (ref 3.3–5)
ALP SERPL-CCNC: 50 U/L — SIGNIFICANT CHANGE UP (ref 40–120)
ALT FLD-CCNC: 16 U/L — SIGNIFICANT CHANGE UP (ref 12–78)
ANION GAP SERPL CALC-SCNC: 9 MMOL/L — SIGNIFICANT CHANGE UP (ref 5–17)
AST SERPL-CCNC: 35 U/L — SIGNIFICANT CHANGE UP (ref 15–37)
BASE EXCESS BLDA CALC-SCNC: -1.9 MMOL/L — SIGNIFICANT CHANGE UP (ref -2–3)
BASOPHILS # BLD AUTO: 0 K/UL — SIGNIFICANT CHANGE UP (ref 0–0.2)
BASOPHILS NFR BLD AUTO: 0 % — SIGNIFICANT CHANGE UP (ref 0–2)
BILIRUB SERPL-MCNC: 1.1 MG/DL — SIGNIFICANT CHANGE UP (ref 0.2–1.2)
BLOOD GAS COMMENTS ARTERIAL: SIGNIFICANT CHANGE UP
BLOOD GAS COMMENTS ARTERIAL: SIGNIFICANT CHANGE UP
BUN SERPL-MCNC: 84 MG/DL — HIGH (ref 7–23)
CALCIUM SERPL-MCNC: 8.8 MG/DL — SIGNIFICANT CHANGE UP (ref 8.5–10.1)
CHLORIDE SERPL-SCNC: 98 MMOL/L — SIGNIFICANT CHANGE UP (ref 96–108)
CO2 SERPL-SCNC: 25 MMOL/L — SIGNIFICANT CHANGE UP (ref 22–31)
CREAT SERPL-MCNC: 2.8 MG/DL — HIGH (ref 0.5–1.3)
D DIMER BLD IA.RAPID-MCNC: 323 NG/ML DDU — HIGH
EOSINOPHIL # BLD AUTO: 0 K/UL — SIGNIFICANT CHANGE UP (ref 0–0.5)
EOSINOPHIL NFR BLD AUTO: 0 % — SIGNIFICANT CHANGE UP (ref 0–6)
GLUCOSE SERPL-MCNC: 427 MG/DL — HIGH (ref 70–99)
HCO3 BLDA-SCNC: 22 MMOL/L — SIGNIFICANT CHANGE UP (ref 21–28)
HCT VFR BLD CALC: 37.1 % — LOW (ref 39–50)
HGB BLD-MCNC: 13.5 G/DL — SIGNIFICANT CHANGE UP (ref 13–17)
HOROWITZ INDEX BLDA+IHG-RTO: 30 — SIGNIFICANT CHANGE UP
IMM GRANULOCYTES NFR BLD AUTO: 0.6 % — SIGNIFICANT CHANGE UP (ref 0–1.5)
LACTATE SERPL-SCNC: 1.7 MMOL/L — SIGNIFICANT CHANGE UP (ref 0.7–2)
LACTATE SERPL-SCNC: 2.3 MMOL/L — HIGH (ref 0.7–2)
LYMPHOCYTES # BLD AUTO: 0.56 K/UL — LOW (ref 1–3.3)
LYMPHOCYTES # BLD AUTO: 10.5 % — LOW (ref 13–44)
MCHC RBC-ENTMCNC: 29.3 PG — SIGNIFICANT CHANGE UP (ref 27–34)
MCHC RBC-ENTMCNC: 36.4 GM/DL — HIGH (ref 32–36)
MCV RBC AUTO: 80.7 FL — SIGNIFICANT CHANGE UP (ref 80–100)
MONOCYTES # BLD AUTO: 0.26 K/UL — SIGNIFICANT CHANGE UP (ref 0–0.9)
MONOCYTES NFR BLD AUTO: 4.9 % — SIGNIFICANT CHANGE UP (ref 2–14)
NEUTROPHILS # BLD AUTO: 4.46 K/UL — SIGNIFICANT CHANGE UP (ref 1.8–7.4)
NEUTROPHILS NFR BLD AUTO: 84 % — HIGH (ref 43–77)
NRBC # BLD: 0 /100 WBCS — SIGNIFICANT CHANGE UP (ref 0–0)
PCO2 BLDA: 30 MMHG — LOW (ref 35–48)
PH BLDA: 7.47 — HIGH (ref 7.35–7.45)
PLATELET # BLD AUTO: 116 K/UL — LOW (ref 150–400)
PO2 BLDA: 103 MMHG — SIGNIFICANT CHANGE UP (ref 83–108)
POTASSIUM SERPL-MCNC: 5 MMOL/L — SIGNIFICANT CHANGE UP (ref 3.5–5.3)
POTASSIUM SERPL-SCNC: 5 MMOL/L — SIGNIFICANT CHANGE UP (ref 3.5–5.3)
PROCALCITONIN SERPL-MCNC: 0.41 NG/ML — HIGH (ref 0–0.04)
PROT SERPL-MCNC: 7.4 G/DL — SIGNIFICANT CHANGE UP (ref 6–8.3)
RBC # BLD: 4.6 M/UL — SIGNIFICANT CHANGE UP (ref 4.2–5.8)
RBC # FLD: 13.6 % — SIGNIFICANT CHANGE UP (ref 10.3–14.5)
SAO2 % BLDA: 99.3 % — HIGH (ref 94–98)
SARS-COV-2 RNA SPEC QL NAA+PROBE: DETECTED
SODIUM SERPL-SCNC: 132 MMOL/L — LOW (ref 135–145)
WBC # BLD: 5.31 K/UL — SIGNIFICANT CHANGE UP (ref 3.8–10.5)
WBC # FLD AUTO: 5.31 K/UL — SIGNIFICANT CHANGE UP (ref 3.8–10.5)

## 2021-08-13 PROCEDURE — 99223 1ST HOSP IP/OBS HIGH 75: CPT | Mod: GC

## 2021-08-13 PROCEDURE — 71045 X-RAY EXAM CHEST 1 VIEW: CPT | Mod: 26

## 2021-08-13 PROCEDURE — 93010 ELECTROCARDIOGRAM REPORT: CPT

## 2021-08-13 PROCEDURE — 99284 EMERGENCY DEPT VISIT MOD MDM: CPT

## 2021-08-13 RX ORDER — SODIUM CHLORIDE 9 MG/ML
1000 INJECTION, SOLUTION INTRAVENOUS
Refills: 0 | Status: DISCONTINUED | OUTPATIENT
Start: 2021-08-13 | End: 2021-08-23

## 2021-08-13 RX ORDER — INSULIN GLARGINE 100 [IU]/ML
35 INJECTION, SOLUTION SUBCUTANEOUS AT BEDTIME
Refills: 0 | Status: DISCONTINUED | OUTPATIENT
Start: 2021-08-13 | End: 2021-08-15

## 2021-08-13 RX ORDER — INSULIN LISPRO 100/ML
VIAL (ML) SUBCUTANEOUS
Refills: 0 | Status: DISCONTINUED | OUTPATIENT
Start: 2021-08-13 | End: 2021-08-14

## 2021-08-13 RX ORDER — INSULIN LISPRO 100/ML
8 VIAL (ML) SUBCUTANEOUS ONCE
Refills: 0 | Status: COMPLETED | OUTPATIENT
Start: 2021-08-13 | End: 2021-08-13

## 2021-08-13 RX ORDER — GLUCAGON INJECTION, SOLUTION 0.5 MG/.1ML
1 INJECTION, SOLUTION SUBCUTANEOUS ONCE
Refills: 0 | Status: DISCONTINUED | OUTPATIENT
Start: 2021-08-13 | End: 2021-08-23

## 2021-08-13 RX ORDER — INSULIN LISPRO 100/ML
VIAL (ML) SUBCUTANEOUS AT BEDTIME
Refills: 0 | Status: DISCONTINUED | OUTPATIENT
Start: 2021-08-13 | End: 2021-08-14

## 2021-08-13 RX ORDER — SODIUM CHLORIDE 9 MG/ML
1000 INJECTION INTRAMUSCULAR; INTRAVENOUS; SUBCUTANEOUS ONCE
Refills: 0 | Status: COMPLETED | OUTPATIENT
Start: 2021-08-13 | End: 2021-08-13

## 2021-08-13 RX ORDER — DEXTROSE 50 % IN WATER 50 %
25 SYRINGE (ML) INTRAVENOUS ONCE
Refills: 0 | Status: DISCONTINUED | OUTPATIENT
Start: 2021-08-13 | End: 2021-08-23

## 2021-08-13 RX ORDER — HEPARIN SODIUM 5000 [USP'U]/ML
7500 INJECTION INTRAVENOUS; SUBCUTANEOUS EVERY 8 HOURS
Refills: 0 | Status: DISCONTINUED | OUTPATIENT
Start: 2021-08-13 | End: 2021-08-13

## 2021-08-13 RX ORDER — DEXAMETHASONE 0.5 MG/5ML
10 ELIXIR ORAL DAILY
Refills: 0 | Status: DISCONTINUED | OUTPATIENT
Start: 2021-08-13 | End: 2021-08-13

## 2021-08-13 RX ORDER — DEXTROSE 50 % IN WATER 50 %
12.5 SYRINGE (ML) INTRAVENOUS ONCE
Refills: 0 | Status: DISCONTINUED | OUTPATIENT
Start: 2021-08-13 | End: 2021-08-23

## 2021-08-13 RX ORDER — DEXAMETHASONE 0.5 MG/5ML
6 ELIXIR ORAL DAILY
Refills: 0 | Status: COMPLETED | OUTPATIENT
Start: 2021-08-13 | End: 2021-08-23

## 2021-08-13 RX ORDER — ONDANSETRON 8 MG/1
4 TABLET, FILM COATED ORAL EVERY 8 HOURS
Refills: 0 | Status: DISCONTINUED | OUTPATIENT
Start: 2021-08-13 | End: 2021-08-23

## 2021-08-13 RX ORDER — ACETAMINOPHEN 500 MG
650 TABLET ORAL EVERY 6 HOURS
Refills: 0 | Status: DISCONTINUED | OUTPATIENT
Start: 2021-08-13 | End: 2021-08-23

## 2021-08-13 RX ORDER — HEPARIN SODIUM 5000 [USP'U]/ML
5000 INJECTION INTRAVENOUS; SUBCUTANEOUS EVERY 8 HOURS
Refills: 0 | Status: DISCONTINUED | OUTPATIENT
Start: 2021-08-13 | End: 2021-08-23

## 2021-08-13 RX ORDER — DEXTROSE 50 % IN WATER 50 %
15 SYRINGE (ML) INTRAVENOUS ONCE
Refills: 0 | Status: DISCONTINUED | OUTPATIENT
Start: 2021-08-13 | End: 2021-08-23

## 2021-08-13 RX ADMIN — Medication 650 MILLIGRAM(S): at 22:24

## 2021-08-13 RX ADMIN — Medication 650 MILLIGRAM(S): at 21:14

## 2021-08-13 RX ADMIN — SODIUM CHLORIDE 1000 MILLILITER(S): 9 INJECTION INTRAMUSCULAR; INTRAVENOUS; SUBCUTANEOUS at 20:30

## 2021-08-13 RX ADMIN — SODIUM CHLORIDE 1000 MILLILITER(S): 9 INJECTION INTRAMUSCULAR; INTRAVENOUS; SUBCUTANEOUS at 18:20

## 2021-08-13 RX ADMIN — INSULIN GLARGINE 35 UNIT(S): 100 INJECTION, SOLUTION SUBCUTANEOUS at 22:23

## 2021-08-13 RX ADMIN — HEPARIN SODIUM 5000 UNIT(S): 5000 INJECTION INTRAVENOUS; SUBCUTANEOUS at 22:23

## 2021-08-13 RX ADMIN — Medication 3: at 22:23

## 2021-08-13 RX ADMIN — Medication 8 UNIT(S): at 20:00

## 2021-08-13 NOTE — H&P ADULT - NSHPSOCIALHISTORY_GEN_ALL_CORE
Tobacco: denies   EtOH: denies   Recreational drug use: denies   Lives with: wife, son   Ambulates: independent   ADLs: independent   Vaccinations: No COVID-19 vaccinated   Full code

## 2021-08-13 NOTE — H&P ADULT - HISTORY OF PRESENT ILLNESS
60 yo Male patient with PMHx of HTN T2DM (insulin dependent) presents today complaining SOB and generalized weakness since 2021. Pt endorses that came back from [place] on  and tested positive for COVID-19, states he was in contact with a friend that tested positive. Since 2021 has been progressively worsening  generalized weakness and exertional dyspnea, associated with cough, decreased appetite with poor oral intake, decreases smell and one episode of vomiting today. Patient before was able to walk long distances, climb stairs w/o dyspna  At this time he denies  fever, chills, chest pain, palpitations, abdominal pain, diarrhea, constipation, urinary frequency, urgency, hematuria, hematochezia, melena or dysuria, changes in vision, dizziness, numbness, tingling.     ED course: VS  Afebrile, HR: 68, BP: 113/59, SpO2: 94%-> 98% 3 NC RR: 19  Labs significant for Neutrophils 84% D-dimer 323 Procalcitonin 0.41, Na 132 BUN/creatinine: 84/2.80, Glucose 427 GFR: 27, lactate 2.3. AB.4/30/103/22/30.0  EKG   Patient received 1 Lt NS bolus in the ED  62 yo Male patient with PMHx of HTN, DM type 2 (insulin dependent) presents today complaining SOB and generalized weakness since 2021. Pt endorses that came back from [place] on  and tested positive for COVID-19, states he was in contact with a friend that tested positive. Since 2021 has been progressively worsening  generalized weakness and exertional dyspnea, associated with cough, decreased appetite with poor oral intake, decreases smell and one episode of vomiting today. Patient before was able to walk long distances, climb stairs w/o dyspna  At this time he denies  fever, chills, chest pain, palpitations, abdominal pain, diarrhea, constipation, urinary frequency, urgency, hematuria, hematochezia, melena or dysuria, changes in vision, dizziness, numbness, tingling. He is not vaccinated against covid19    ED course: VS  Afebrile, HR: 68, BP: 113/59, SpO2: 94%-> 98% 3 NC RR: 19  Labs significant for Neutrophils 84% D-dimer 323 Procalcitonin 0.41, Na 132 BUN/creatinine: 84/2.80, Glucose 427 GFR: 27, lactate 2.3. AB.4/30/103/22/30.0  Patient received 1 Lt NS bolus in the ED

## 2021-08-13 NOTE — H&P ADULT - NSICDXFAMILYHX_GEN_ALL_CORE_FT
FAMILY HISTORY:  Father  Still living? Unknown  FH: HTN (hypertension), Age at diagnosis: Age Unknown    Mother  Still living? Unknown  FH: HTN (hypertension), Age at diagnosis: Age Unknown

## 2021-08-13 NOTE — H&P ADULT - ASSESSMENT
60 yo Male patient with PMHx of HTN T2DM (insulin dependent) presents today complaining SOB and generalized weakness since 2021. Tested positive for COVID-19 on 2021. Patient admitted for acute respiratory failure secondary to COVID-19 PNA      # Acute respiratory failure secondary to COVID-19 PNA  - admit w/ remote telemetry monitoring  - Saturating well 93% on 3lt NC,  supplemental O2 prn to maintain O2 sats >93%  - D- dimer 323, procalcitonin 0.41, Lactate 2.3. AB.4/30/103/22/30.0  - Tylenol prn myalgias, fever  - AC with----  - Full code   - ID Dr. Aguayo consulted     # SORAIDA on CKD, probably prerenal in the setting of dehydration   - Creatine on  1.35   - BUN/creatinine: 84/2.80   - Hold home Edarbydor  -  monitor serum creatinine & urine output  - Avoid nephrotoxic medications    # T2DM   -  Elevated serum glucose 427 on admission   - Uses at home Levemir 50 units at bedtime   - Will continue Levemir 35 units at bedtime   -HgbA1c  -Insulin sliding scale  -Accu checks w/ hypoglycemic protocol    # HTN   - Hypotensive in the ED likely secondary to dehydration given poor intake   - patient takes Edarbydor 40-12.5 mg qd   - Will hold in the setting of SORAIDA and  hypotension   - DASH/TLC  - Monitor BP & titrate BP meds PRN 62 yo Male patient with PMHx of HTN T2DM (insulin dependent) presents today complaining SOB and generalized weakness since 2021. Tested positive for COVID-19 on 2021. Patient admitted for acute respiratory failure secondary to COVID-19 PNA      # Acute respiratory failure secondary to COVID-19 PNA  - admit w/ remote telemetry monitoring  - Saturating well 93% on 3lt NC,  supplemental O2 prn to maintain O2 sats >93%  - D- dimer 323, procalcitonin 0.41, Lactate 2.3. AB.4/30/103/22/30.0  - Tylenol prn myalgias, fever  - AC with heparin 5000 sq q8   - Full code   - ID Dr. Aguayo consulted     # SORAIDA on CKD, probably prerenal in the setting of dehydration   - Creatine on  1.35   - BUN/creatinine: 84/2.80   - Hold home Edarbydor  -  monitor serum creatinine & urine output  - Avoid nephrotoxic medications    # T2DM   -  Elevated serum glucose 427 on admission   - Uses at home Levemir 50 units at bedtime   - Will continue Levemir 35 units at bedtime   - f/u HgbA1c  - low dose Insulin sliding scale  - Accu checks w/ hypoglycemic protocol    # HTN   - Hypotensive in the ED likely secondary to dehydration given poor intake   - patient takes Edarbydor 40-12.5 mg qd   - Will hold in the setting of SORAIDA and  hypotension   - Monitor BP & titrate BP meds PRN 62 yo Male patient with PMHx of HTN T2DM (insulin dependent) presents today complaining SOB and generalized weakness since 2021. Tested positive for COVID-19 on 2021. Patient admitted for acute respiratory failure secondary to COVID-19 PNA      # Acute respiratory failure secondary to COVID-19 PNA  - admit w/ remote telemetry monitoring  - Saturating well 93% on 3lt NC,  supplemental O2 prn to maintain O2 sats >93%  - D- dimer 323, procalcitonin 0.41, Lactate 2.3. AB.4/30/103/22/30.0  - f/u repeat lactate   - Decadron 10mg po x 10 days  - Will hold off on Remdesivir- awaiting ID recs  - Tylenol prn myalgias, fever  - AC with heparin 5000 sq q8   - Full code   - ID Dr. Aguayo consulted     # SORAIDA on CKD, probably prerenal in the setting of dehydration   - Creatine on  1.35   - BUN/creatinine: 84/2.80   - Hold home Edarbydor  -  monitor serum creatinine & urine output  - Avoid nephrotoxic medications  - Consult Nephro- Dr. JOLENE Duenas     # T2DM   -  Elevated serum glucose 427 on admission   - Uses at home Levemir 50 units at bedtime   - Will continue Levemir 35 units at bedtime   - f/u HgbA1c  - low dose Insulin sliding scale  - Accu checks w/ hypoglycemic protocol    Thrombocytopenia  - Plt- 116   - likely 2/2 to sepsis sepsis     # HTN   - Hypotensive in the ED likely secondary to dehydration given poor intake   - patient takes Edarbydor 40-12.5 mg qd   - Will hold in the setting of SORAIDA and  hypotension   - Monitor BP & titrate BP meds PRN    # DVT Prophylaxis   - Heparin 500 SQ q8 60 yo Male patient with PMHx of HTN T2DM (insulin dependent) presents today complaining SOB and generalized weakness since 2021. Tested positive for COVID-19 on 2021. Patient admitted for acute respiratory failure secondary to COVID-19 PNA      # Acute respiratory failure secondary to COVID-19 PNA  - admit w/ remote telemetry monitoring  - Saturating well 93% on 3lt NC,  supplemental O2 prn to maintain O2 sats >93%  - D- dimer 323, procalcitonin 0.41, Lactate 2.3. AB.4/30/103/22/30.0  - repeat lactate WNL  - Decadron 10mg po x 10 days  - Will hold off on Remdesivir- awaiting ID recs given hx renal fxn  - Tylenol prn myalgias, fever  - AC with heparin 5000 sq q8   - Full code   - ID Dr. Aguayo consulted     # SORAIDA on CKD, probably prerenal in the setting of dehydration   - Creatine on  1.35   - BUN/creatinine: 84/2.80   - Hold home Edarbydor  -  monitor serum creatinine & urine output  - Avoid nephrotoxic medications  -s/p 2L NS so will avoid further IVF and recheck CMP in AM  - Consult Nephro- Dr. JOLENE Duenas     # DM type 2  -  Elevated serum glucose 427 on admission   - Uses at home Levemir 50 units at bedtime   - Will continue Levemir 35 units at bedtime as he has decreased PO intake he states  -may need to increase lantus given he will be on PO steroids  - f/u HgbA1c  - low dose Insulin sliding scale  - Accu checks w/ hypoglycemic protocol    Thrombocytopenia  - Plt- 116   - likely 2/2 to covid-19 infxn    # HTN   - Hypotensive in the ED likely secondary to dehydration given poor intake   - patient takes Edarbydor 40-12.5 mg qd   - Will hold in the setting of SORAIDA and  hypotension   - Monitor BP & titrate BP meds PRN  -now normotensive    # DVT Prophylaxis   - Heparin 5000 SQ q8

## 2021-08-13 NOTE — ED PROVIDER NOTE - ATTENDING CONTRIBUTION TO CARE
I have personally performed a face to face diagnostic evaluation on this patient.  I have reviewed the PA note and agree with the history, exam, and plan of care, except as noted.  History and Exam by me shows  sob and tested positive for Covid 2 weeks ago.  Pt is in nad.  Alert and O x 3.  lungs- cta bl, no wheezes , no rales.   Admit for hypoxemia.

## 2021-08-13 NOTE — ED PROVIDER NOTE - OBJECTIVE STATEMENT
62 yo M PMHx DM, HTN, known covid positive diagnosed 8/5/21 at  as per pt presents to ED c/o shortness of breath, chest tightness x 2 days, getting progressively worse. Pt is not vaccinated against covid. Pt denies back pain, dizziness, abd pain, N/V/D.

## 2021-08-13 NOTE — ED ADULT NURSE NOTE - OBJECTIVE STATEMENT
Pt. received alert and oriented x4 with chief complaint of SOB for last 2 days. Pt. tested positive for covid as of 8/5 after returning from [place]. Pt. placed on 3L nasal cannula. Pt. also placed on continuous cardiac monitor with continuous pulse ox.

## 2021-08-13 NOTE — ED PROVIDER NOTE - CARE PLAN
1 Principal Discharge DX:	COVID-19  Secondary Diagnosis:	Acute respiratory failure with hypoxia  Secondary Diagnosis:	SORAIDA (acute kidney injury)  Secondary Diagnosis:	Hyperglycemia

## 2021-08-13 NOTE — H&P ADULT - ATTENDING COMMENTS
60 yo Male patient with PMHx of HTN T2DM (insulin dependent) presents today complaining SOB and generalized weakness since 08/11/2021. Tested positive for COVID-19 on 08/05/2021. Patient admitted for acute respiratory failure secondary to COVID-19     HPI as above    T(C): 37.5 (08-13-21 @ 22:22), Max: 37.7 (08-13-21 @ 21:22)  HR: 60 (08-13-21 @ 22:22) (60 - 74)  BP: 110/58 (08-13-21 @ 22:22) (80/52 - 113/59)  RR: 16 (08-13-21 @ 22:22) (16 - 19)  SpO2: 99% (08-13-21 @ 22:22) (94% - 99%)  Wt(kg): --    Physical Exam:   GENERAL: well-groomed, well-developed, NAD  HEENT: head NC/AT; EOM intact, conjunctiva & sclera clear; hearing grossly intact, moist mucous membranes  NECK: supple, no JVD  RESPIRATORY: decreased breath sounds b/l lung bases  CARDIOVASCULAR: S1&S2, RRR, no murmurs or gallops  ABDOMEN: soft, non-tender, non-distended, + Bowel sounds x4 quadrants, no guarding, rebound or rigidity. + reducible hernia  MUSCULOSKELETAL:  no clubbing, cyanosis or edema of all 4 extremities  LYMPH: no cervical lymphadenopathy  VASCULAR: Radial pulses 2+ bilaterally, no varicose veins   SKIN: warm and dry, color normal  NEUROLOGIC: AA&O X3, CN2-12 intact w/ no focal deficits, no sensory loss, motor Strength 5/5 in UE & LE B/L  Psych: Normal mood and affect, normal behavior    Plan:   Continue PO steroids  -ID consulted  -contact precautions  -monitor renal fxn  -remainder as above

## 2021-08-13 NOTE — H&P ADULT - NSHPPHYSICALEXAM_GEN_ALL_CORE
T(C): 37.2 (08-13-21 @ 17:35), Max: 37.2 (08-13-21 @ 17:35)  HR: 74 (08-13-21 @ 18:45) (68 - 74)  BP: 113/59 (08-13-21 @ 18:45) (80/52 - 113/59)  RR: 19 (08-13-21 @ 18:45) (18 - 19)  SpO2: 98% (08-13-21 @ 18:45) (94% - 98%)    GENERAL: patient appears well, no acute distress, appropriate, pleasant  EYES: sclera clear, no exudates  ENMT: oropharynx clear without erythema, no exudates, moist mucous membranes  NECK: supple, soft, no thyromegaly noted  LUNGS: good air entry bilaterally, clear to auscultation, symmetric breath sounds, no wheezing or rhonchi appreciated  HEART: soft S1/S2, regular rate and rhythm, no murmurs noted, no lower extremity edema  GASTROINTESTINAL: abdomen is soft, nontender, nondistended, normoactive bowel sounds, no palpable masses  INTEGUMENT: good skin turgor, no lesions noted  MUSCULOSKELETAL: no clubbing or cyanosis, no obvious deformity  NEUROLOGIC: awake, alert, oriented x3, good muscle tone in 4 extremities, no obvious sensory deficits  PSYCHIATRIC: mood is good, affect is congruent, linear and logical thought process  HEME/LYMPH: no palpable supraclavicular nodules, no obvious ecchymosis or petechiae T(C): 37.2 (08-13-21 @ 17:35), Max: 37.2 (08-13-21 @ 17:35)  HR: 74 (08-13-21 @ 18:45) (68 - 74)  BP: 113/59 (08-13-21 @ 18:45) (80/52 - 113/59)  RR: 19 (08-13-21 @ 18:45) (18 - 19)  SpO2: 98% (08-13-21 @ 18:45) (94% - 98%)    GENERAL: patient appears well, appropriate, pleasant  EYES: sclera clear, no exudates  ENMT: oropharynx clear without erythema, no exudates, moist mucous membranes  NECK: supple, soft, no thyromegaly noted  LUNGS: crackles b/l at lung base; good air entry bilaterally  HEART: soft S1/S2, regular rate and rhythm, no murmurs noted, no lower extremity edema  GASTROINTESTINAL: reducible ventral abdominal hernia; abdomen is soft, nontender, nondistended, normoactive bowel sounds, no palpable masses  INTEGUMENT: good skin turgor, no lesions noted  MUSCULOSKELETAL: no clubbing or cyanosis, no obvious deformity  NEUROLOGIC: awake, alert, oriented x3, good muscle tone in 4 extremities, no obvious sensory deficits

## 2021-08-14 LAB
A1C WITH ESTIMATED AVERAGE GLUCOSE RESULT: 10.6 % — HIGH (ref 4–5.6)
ALBUMIN SERPL ELPH-MCNC: 2.4 G/DL — LOW (ref 3.3–5)
ALP SERPL-CCNC: 46 U/L — SIGNIFICANT CHANGE UP (ref 40–120)
ALT FLD-CCNC: 15 U/L — SIGNIFICANT CHANGE UP (ref 12–78)
ANION GAP SERPL CALC-SCNC: 7 MMOL/L — SIGNIFICANT CHANGE UP (ref 5–17)
AST SERPL-CCNC: 30 U/L — SIGNIFICANT CHANGE UP (ref 15–37)
BASOPHILS # BLD AUTO: 0.01 K/UL — SIGNIFICANT CHANGE UP (ref 0–0.2)
BASOPHILS NFR BLD AUTO: 0.2 % — SIGNIFICANT CHANGE UP (ref 0–2)
BILIRUB SERPL-MCNC: 0.8 MG/DL — SIGNIFICANT CHANGE UP (ref 0.2–1.2)
BUN SERPL-MCNC: 73 MG/DL — HIGH (ref 7–23)
CALCIUM SERPL-MCNC: 8.5 MG/DL — SIGNIFICANT CHANGE UP (ref 8.5–10.1)
CHLORIDE SERPL-SCNC: 107 MMOL/L — SIGNIFICANT CHANGE UP (ref 96–108)
CO2 SERPL-SCNC: 25 MMOL/L — SIGNIFICANT CHANGE UP (ref 22–31)
COVID-19 SPIKE DOMAIN AB INTERP: NEGATIVE — SIGNIFICANT CHANGE UP
COVID-19 SPIKE DOMAIN ANTIBODY RESULT: 0.63 U/ML — SIGNIFICANT CHANGE UP
CREAT SERPL-MCNC: 2.2 MG/DL — HIGH (ref 0.5–1.3)
CRP SERPL-MCNC: 72 MG/L — HIGH
EOSINOPHIL # BLD AUTO: 0 K/UL — SIGNIFICANT CHANGE UP (ref 0–0.5)
EOSINOPHIL NFR BLD AUTO: 0 % — SIGNIFICANT CHANGE UP (ref 0–6)
ESTIMATED AVERAGE GLUCOSE: 258 MG/DL — HIGH (ref 68–114)
FERRITIN SERPL-MCNC: 1084 NG/ML — HIGH (ref 30–400)
GLUCOSE SERPL-MCNC: 221 MG/DL — HIGH (ref 70–99)
HCT VFR BLD CALC: 33.9 % — LOW (ref 39–50)
HCV AB S/CO SERPL IA: 0.13 S/CO — SIGNIFICANT CHANGE UP (ref 0–0.99)
HCV AB SERPL-IMP: SIGNIFICANT CHANGE UP
HGB BLD-MCNC: 12.5 G/DL — LOW (ref 13–17)
IMM GRANULOCYTES NFR BLD AUTO: 0.7 % — SIGNIFICANT CHANGE UP (ref 0–1.5)
LYMPHOCYTES # BLD AUTO: 0.53 K/UL — LOW (ref 1–3.3)
LYMPHOCYTES # BLD AUTO: 9 % — LOW (ref 13–44)
MCHC RBC-ENTMCNC: 29.8 PG — SIGNIFICANT CHANGE UP (ref 27–34)
MCHC RBC-ENTMCNC: 36.9 GM/DL — HIGH (ref 32–36)
MCV RBC AUTO: 80.9 FL — SIGNIFICANT CHANGE UP (ref 80–100)
MONOCYTES # BLD AUTO: 0.23 K/UL — SIGNIFICANT CHANGE UP (ref 0–0.9)
MONOCYTES NFR BLD AUTO: 3.9 % — SIGNIFICANT CHANGE UP (ref 2–14)
NEUTROPHILS # BLD AUTO: 5.06 K/UL — SIGNIFICANT CHANGE UP (ref 1.8–7.4)
NEUTROPHILS NFR BLD AUTO: 86.2 % — HIGH (ref 43–77)
NRBC # BLD: 0 /100 WBCS — SIGNIFICANT CHANGE UP (ref 0–0)
PLATELET # BLD AUTO: 124 K/UL — LOW (ref 150–400)
POTASSIUM SERPL-MCNC: 4.2 MMOL/L — SIGNIFICANT CHANGE UP (ref 3.5–5.3)
POTASSIUM SERPL-SCNC: 4.2 MMOL/L — SIGNIFICANT CHANGE UP (ref 3.5–5.3)
PROT SERPL-MCNC: 6.8 G/DL — SIGNIFICANT CHANGE UP (ref 6–8.3)
RBC # BLD: 4.19 M/UL — LOW (ref 4.2–5.8)
RBC # FLD: 13.8 % — SIGNIFICANT CHANGE UP (ref 10.3–14.5)
SARS-COV-2 IGG+IGM SERPL QL IA: 0.63 U/ML — SIGNIFICANT CHANGE UP
SARS-COV-2 IGG+IGM SERPL QL IA: NEGATIVE — SIGNIFICANT CHANGE UP
SODIUM SERPL-SCNC: 139 MMOL/L — SIGNIFICANT CHANGE UP (ref 135–145)
WBC # BLD: 5.87 K/UL — SIGNIFICANT CHANGE UP (ref 3.8–10.5)
WBC # FLD AUTO: 5.87 K/UL — SIGNIFICANT CHANGE UP (ref 3.8–10.5)

## 2021-08-14 PROCEDURE — 99233 SBSQ HOSP IP/OBS HIGH 50: CPT

## 2021-08-14 RX ORDER — INSULIN HUMAN 100 [IU]/ML
10 INJECTION, SOLUTION SUBCUTANEOUS ONCE
Refills: 0 | Status: COMPLETED | OUTPATIENT
Start: 2021-08-14 | End: 2021-08-14

## 2021-08-14 RX ORDER — INSULIN LISPRO 100/ML
VIAL (ML) SUBCUTANEOUS AT BEDTIME
Refills: 0 | Status: DISCONTINUED | OUTPATIENT
Start: 2021-08-14 | End: 2021-08-15

## 2021-08-14 RX ORDER — INSULIN LISPRO 100/ML
3 VIAL (ML) SUBCUTANEOUS
Refills: 0 | Status: DISCONTINUED | OUTPATIENT
Start: 2021-08-14 | End: 2021-08-15

## 2021-08-14 RX ORDER — INSULIN LISPRO 100/ML
VIAL (ML) SUBCUTANEOUS
Refills: 0 | Status: DISCONTINUED | OUTPATIENT
Start: 2021-08-14 | End: 2021-08-23

## 2021-08-14 RX ADMIN — Medication 650 MILLIGRAM(S): at 08:26

## 2021-08-14 RX ADMIN — Medication 6 MILLIGRAM(S): at 08:24

## 2021-08-14 RX ADMIN — INSULIN GLARGINE 35 UNIT(S): 100 INJECTION, SOLUTION SUBCUTANEOUS at 22:19

## 2021-08-14 RX ADMIN — Medication 10: at 17:25

## 2021-08-14 RX ADMIN — Medication 4: at 11:46

## 2021-08-14 RX ADMIN — INSULIN HUMAN 10 UNIT(S): 100 INJECTION, SOLUTION SUBCUTANEOUS at 23:59

## 2021-08-14 RX ADMIN — HEPARIN SODIUM 5000 UNIT(S): 5000 INJECTION INTRAVENOUS; SUBCUTANEOUS at 08:25

## 2021-08-14 RX ADMIN — Medication 650 MILLIGRAM(S): at 03:53

## 2021-08-14 RX ADMIN — HEPARIN SODIUM 5000 UNIT(S): 5000 INJECTION INTRAVENOUS; SUBCUTANEOUS at 14:38

## 2021-08-14 RX ADMIN — Medication 3 UNIT(S): at 17:26

## 2021-08-14 RX ADMIN — Medication 2: at 08:25

## 2021-08-14 RX ADMIN — Medication 4: at 22:20

## 2021-08-14 NOTE — DIETITIAN INITIAL EVALUATION ADULT. - CHIEF COMPLAINT
61y Male with PMH of DM, HTN. Pt admitted on 8/13 complaining of shortness of breath. Presenting COVID+.

## 2021-08-14 NOTE — DIETITIAN INITIAL EVALUATION ADULT. - ADD RECOMMEND
1) Continue Consistent carbohydrate, DASH/TLC diet. 2) Monitor PO intake of melas and supplements provided. 3) Monitor GI function, labs.

## 2021-08-14 NOTE — PROGRESS NOTE ADULT - SUBJECTIVE AND OBJECTIVE BOX
Patient is a 61y old  Male who presents with a chief complaint of acute hypoxic respiratory failure (14 Aug 2021 14:06)      INTERVAL HPI/OVERNIGHT EVENTS: Patient seen and examined at bedside. No overnight events occurred. Patient has no complaints at this time.     MEDICATIONS  (STANDING):  dexAMETHasone     Tablet 6 milliGRAM(s) Oral daily  dextrose 40% Gel 15 Gram(s) Oral once  dextrose 5%. 1000 milliLiter(s) (50 mL/Hr) IV Continuous <Continuous>  dextrose 5%. 1000 milliLiter(s) (100 mL/Hr) IV Continuous <Continuous>  dextrose 50% Injectable 25 Gram(s) IV Push once  dextrose 50% Injectable 12.5 Gram(s) IV Push once  dextrose 50% Injectable 25 Gram(s) IV Push once  glucagon  Injectable 1 milliGRAM(s) IntraMuscular once  heparin   Injectable 5000 Unit(s) SubCutaneous every 8 hours  insulin glargine Injectable (LANTUS) 35 Unit(s) SubCutaneous at bedtime  insulin lispro (ADMELOG) corrective regimen sliding scale   SubCutaneous three times a day before meals  insulin lispro (ADMELOG) corrective regimen sliding scale   SubCutaneous at bedtime    MEDICATIONS  (PRN):  acetaminophen   Tablet .. 650 milliGRAM(s) Oral every 6 hours PRN Temp greater or equal to 38.5C (101.3F), Mild Pain (1 - 3)  ondansetron Injectable 4 milliGRAM(s) IV Push every 8 hours PRN Nausea and/or Vomiting      Allergies    calcium channel blockers (Other)    Intolerances        REVIEW OF SYSTEMS:  CONSTITUTIONAL: No fever or chills. +Fatigue  HEENT:  No headache, no sore throat  RESPIRATORY: + cough, +shortness of breath  CARDIOVASCULAR: No chest pain, palpitations  GASTROINTESTINAL: No abd pain, nausea, vomiting, or diarrhea  GENITOURINARY: No dysuria, frequency, or hematuria  NEUROLOGICAL: no focal weakness or dizziness  MUSCULOSKELETAL: no myalgias     Vital Signs Last 24 Hrs  T(C): 37.6 (14 Aug 2021 11:47), Max: 38.2 (14 Aug 2021 03:39)  T(F): 99.6 (14 Aug 2021 11:47), Max: 100.8 (14 Aug 2021 03:39)  HR: 62 (14 Aug 2021 09:14) (60 - 74)  BP: 105/65 (14 Aug 2021 09:14) (80/52 - 113/59)  BP(mean): --  RR: 18 (14 Aug 2021 09:14) (16 - 19)  SpO2: 99% (14 Aug 2021 11:47) (94% - 99%)    PHYSICAL EXAM:  GENERAL: NAD  HEENT:  anicteric, moist mucous membranes  CHEST/LUNG:  +air entry, no rales, wheezes, or rhonchi  HEART:  RRR, S1, S2  ABDOMEN:  BS+, soft, nontender, nondistended  EXTREMITIES: no edema, cyanosis, or calf tenderness  NERVOUS SYSTEM: answers questions and follows commands appropriately    LABS:                        12.5   5.87  )-----------( 124      ( 14 Aug 2021 06:09 )             33.9     CBC Full  -  ( 14 Aug 2021 06:09 )  WBC Count : 5.87 K/uL  Hemoglobin : 12.5 g/dL  Hematocrit : 33.9 %  Platelet Count - Automated : 124 K/uL  Mean Cell Volume : 80.9 fl  Mean Cell Hemoglobin : 29.8 pg  Mean Cell Hemoglobin Concentration : 36.9 gm/dL  Auto Neutrophil # : 5.06 K/uL  Auto Lymphocyte # : 0.53 K/uL  Auto Monocyte # : 0.23 K/uL  Auto Eosinophil # : 0.00 K/uL  Auto Basophil # : 0.01 K/uL  Auto Neutrophil % : 86.2 %  Auto Lymphocyte % : 9.0 %  Auto Monocyte % : 3.9 %  Auto Eosinophil % : 0.0 %  Auto Basophil % : 0.2 %    14 Aug 2021 06:09    139    |  107    |  73     ----------------------------<  221    4.2     |  25     |  2.20     Ca    8.5        14 Aug 2021 06:09    TPro  6.8    /  Alb  2.4    /  TBili  0.8    /  DBili  x      /  AST  30     /  ALT  15     /  AlkPhos  46     14 Aug 2021 06:09        CAPILLARY BLOOD GLUCOSE      POCT Blood Glucose.: 317 mg/dL (14 Aug 2021 11:37)  POCT Blood Glucose.: 215 mg/dL (14 Aug 2021 08:23)  POCT Blood Glucose.: 391 mg/dL (13 Aug 2021 21:52)          RADIOLOGY & ADDITIONAL TESTS:    Personally reviewed.     Consultant(s) Notes Reviewed:  [x] YES  [ ] NO

## 2021-08-14 NOTE — PROGRESS NOTE ADULT - ASSESSMENT
62 yo Male patient with PMHx of HTN T2DM (insulin dependent) presents today complaining SOB and generalized weakness since 2021. Tested positive for COVID-19 on 2021. Patient admitted for acute respiratory failure secondary to COVID-19 PNA    # Acute respiratory failure secondary to COVID-19 PNA  - admit w/ remote telemetry monitoring  - on nasal canula, maintain sats >92%  - D- dimer 323, procalcitonin 0.41, Lactate 2.3. AB.4/30/103/22/30.0  - Decadron 10mg po x 10 days  - Will hold off on Remdesivir- awaiting ID recs given acute kidney injury   - Tylenol prn myalgias, fever  - AC with heparin 5000 sq q8   - Full code   - ID Dr. Aguayo consulted, discussed, Dr Suh to see pt.     # SORAIDA on CKD, probably prerenal in the setting of dehydration   - (cr 2.8 on admission). from prev chart review, last Creatinine was 1.3 in 2017 (unclear if this is baseline as it was years ago, but this is only baseline available for comparison), f/u nephrology  - Hold home Edarbyclor  - monitor serum creatinine & urine output  - Consult Nephro- Dr. JOLENE Duenas     # DM type 2  - Elevated serum glucose 427 on admission   - Uses at home Levemir 50 units at bedtime   - Will continue Levemir 35 units at bedtime as he has decreased PO intake he states  - may need to increase lantus given he will be on PO steroids  - f/u HgbA1c  - moderate dose coverage scale  - Accu checks w/ hypoglycemic protocol    Thrombocytopenia  - Plt- 116   - likely 2/2 to covid-19 infxn    # HTN   - Hypotensive in the ED likely secondary to dehydration given poor intake   - patient takes Edarbclor 40-12.5 mg qd   - Will hold in the setting of SORAIDA and  hypotension   - Monitor BP & titrate BP meds PRN  -now normotensive    # DVT Prophylaxis   - Heparin 5000 SQ q8

## 2021-08-14 NOTE — ED ADULT NURSE REASSESSMENT NOTE - NS ED NURSE REASSESS COMMENT FT1
patient c/o body aches pain 3/10 tylenol given as per orders. Breathing in no distress
Received pt alert and orientated. Pt is awaiting a bed. Pt is weak. Report given pt is going to the floor. Call bell within reach. Freq rounding performed. Safety/Comfort maintained. Will continue to monitor.

## 2021-08-14 NOTE — CHART NOTE - NSCHARTNOTEFT_GEN_A_CORE
Called by RN for hyperglycemia. At 9:51 patient had a glucose level of 561, repeat blood sugar levels were 399, 441. Patient was given his nighttime dose of lantis and patient blood sugar at 11:38 pm was 457, 418. Patient given 10 units of insulin due to persisting hyperglycemia. RN to follow up 1 hour after insulin administration for blood sugar levels. Called by RN for hyperglycemia. At 9:51 patient had a glucose level of 561, repeat blood sugar levels were 399, 441. Patient was given his nighttime dose of lantis and patient blood sugar at 11:38 pm was 457, 418. Patient given 10 units of insulin due to persisting hyperglycemia. RN to follow up 1 hour after insulin administration for blood sugar levels. RN to follow up if any changes. Called by RN for hyperglycemia. At 9:51PM patient had a glucose level of 561, repeat blood sugar levels were 399, 441. Patient was given his nighttime dose of lantis and patient blood sugar at 11:38 pm was 457, 418. Patient given 10 units of insulin due to persisting hyperglycemia. RN to follow up 1 hour after insulin administration for blood sugar levels. Will continue to monitor, RN to call if any changes.    ADDENDUM:  BS persistently elevated in the 400s. Patient is asymptomatic. Will hold off on additional insulin at this time as patient has received his bedtime lantus, sliding scale and an additional 10 U of insulin R. F/u AM accucheck. RN to follow up if any changes.

## 2021-08-14 NOTE — DIETITIAN INITIAL EVALUATION ADULT. - ORAL INTAKE PTA/DIET HISTORY
PTA per pt  -Intake: decreased appetite x1 week PTA  -Chewing/Swallowing: denies difficulty   -Allergies/Intolerances: NKFA  -Vitamins/Supplements: Vitamin C

## 2021-08-14 NOTE — DIETITIAN INITIAL EVALUATION ADULT. - OTHER INFO
Current:   -Intake: per flowsheet, 50% intake; pt confirms intake of 50-75% in-house   -GI: no BM noted thus far; no bowel regimen active   -Renal: SORAIDA on CKD   -Endo: on admission pt with serum glucose of 427; long acting and sliding scale insulin ordered; Consistent carbohydrate diet active     Weight Hx:  -Current: 186 pounds (dosing) - question scale accuracy vs true weight loss   -Per pt UBW: 197 pounds   -No current weights available in [facility]     DM Hx:   -Latest A1c: 10.6% - indicates uncontrolled DM  -Pt endorses taking Levemir at night daily; reports average blood glucose levels of  - not indicative of recent A1c     Education:   -Emphasized importance of monitoring carbohydrate intake and paring whole grain sources with proteins  -Encouraged pt to follow up with outpatient endocrinologist

## 2021-08-14 NOTE — CONSULT NOTE ADULT - ASSESSMENT
SORAIDA/CKD  COVID19 PNA  Acute respiratory failure  DM  HTN      -Cr 1.35 on 2017 suggest CKDIII baseline   -SORAIDA clinically pre-renal. Cannot r/o COVID directly renal injury but less likely  -Avoid excessive IVF with COVID pulmonary status  -Urine studies  -Renal US if continue to worsen  -No indication for RRT at this time    Thank you for involving nephrology with the care of this patient

## 2021-08-14 NOTE — DIETITIAN INITIAL EVALUATION ADULT. - ETIOLOGY
uncontrolled DM likely in the setting of excessive carbohydrate intake and DM medication regimen increased physiological demand

## 2021-08-14 NOTE — CONSULT NOTE ADULT - SUBJECTIVE AND OBJECTIVE BOX
Patient is a 61y old  Male who presents with a chief complaint of acute hypoxic respiratory failure (13 Aug 2021 19:56)       HPI:  60 yo Male patient with PMHx of HTN, DM type 2 (insulin dependent) presents today complaining SOB and generalized weakness since 2021. Pt endorses that came back from [place] on  and tested positive for COVID-19, states he was in contact with a friend that tested positive. Since 2021 has been progressively worsening  generalized weakness and exertional dyspnea, associated with cough, decreased appetite with poor oral intake, decreases smell and one episode of vomiting today. Patient before was able to walk long distances, climb stairs w/o dyspna  At this time he denies  fever, chills, chest pain, palpitations, abdominal pain, diarrhea, constipation, urinary frequency, urgency, hematuria, hematochezia, melena or dysuria, changes in vision, dizziness, numbness, tingling. He is not vaccinated against covid19    ED course: VS  Afebrile, HR: 68, BP: 113/59, SpO2: 94%-> 98% 3 NC RR: 19  Labs significant for Neutrophils 84% D-dimer 323 Procalcitonin 0.41, Na 132 BUN/creatinine: 84/2.80, Glucose 427 GFR: 27, lactate 2.3. AB.4/30/103/22/30.0  Patient received 1 Lt NS bolus in the ED  (13 Aug 2021 19:56)    Renal is being consulted for SORAIDA/CKD. No urinary c/o.        PAST MEDICAL & SURGICAL HISTORY:  Hypertension    Diabetes mellitus    No significant past surgical history         FAMILY HISTORY:  FH: HTN (hypertension) (Father, Mother)    NC    Social History:Non smoker    MEDICATIONS  (STANDING):  dexAMETHasone     Tablet 6 milliGRAM(s) Oral daily  dextrose 40% Gel 15 Gram(s) Oral once  dextrose 5%. 1000 milliLiter(s) (50 mL/Hr) IV Continuous <Continuous>  dextrose 5%. 1000 milliLiter(s) (100 mL/Hr) IV Continuous <Continuous>  dextrose 50% Injectable 25 Gram(s) IV Push once  dextrose 50% Injectable 12.5 Gram(s) IV Push once  dextrose 50% Injectable 25 Gram(s) IV Push once  glucagon  Injectable 1 milliGRAM(s) IntraMuscular once  heparin   Injectable 5000 Unit(s) SubCutaneous every 8 hours  insulin glargine Injectable (LANTUS) 35 Unit(s) SubCutaneous at bedtime  insulin lispro (ADMELOG) corrective regimen sliding scale   SubCutaneous three times a day before meals  insulin lispro (ADMELOG) corrective regimen sliding scale   SubCutaneous at bedtime    MEDICATIONS  (PRN):  acetaminophen   Tablet .. 650 milliGRAM(s) Oral every 6 hours PRN Temp greater or equal to 38.5C (101.3F), Mild Pain (1 - 3)  ondansetron Injectable 4 milliGRAM(s) IV Push every 8 hours PRN Nausea and/or Vomiting   Meds reviewed    Allergies    calcium channel blockers (Other)    Intolerances         REVIEW OF SYSTEMS:    CONSTITUTIONAL:  No weight loss   EYES: No eye pain, visual disturbances, or discharge  ENMT:  No difficulty hearing, tinnitus, vertigo; No sinus or throat pain  NECK: No pain or stiffness  BREASTS: No pain, masses, or nipple discharge  RESPIRATORY: No SOB. No wheeze. No STEVENS  CARDIOVASCULAR: No chest pain, palpitations, dizziness,   GASTROINTESTINAL: No abdominal or epigastric pain. No nausea, vomiting, or hematemesis; No diarrhea or constipation. No melena or hematochezia.Trunckal obesity  GENITOURINARY: No dysuria, frequency, hematuria, or incontinence  NEUROLOGICAL: No headaches, memory loss, loss of strength, numbness, or tremors  SKIN: Diffuse erythema, no blisters  LYMPH NODES: No enlarged glands  ENDOCRINE: No heat or cold intolerance; No hair loss  MUSCULOSKELETAL: No joint pain or swelling   PSYCHIATRIC: No depression, anxiety, mood swings, or difficulty sleeping  HEME/LYMPH: No easy bruising, or bleeding gums  ALLERY AND IMMUNOLOGIC: No hives or eczema      Vital Signs Last 24 Hrs  T(C): 37.4 (14 Aug 2021 08:19), Max: 38.2 (14 Aug 2021 03:39)  T(F): 99.3 (14 Aug 2021 08:19), Max: 100.8 (14 Aug 2021 03:39)  HR: 63 (14 Aug 2021 08:19) (60 - 74)  BP: 110/60 (14 Aug 2021 08:19) (80/52 - 113/59)  BP(mean): --  RR: 16 (14 Aug 2021 08:19) (16 - 19)  SpO2: 95% (14 Aug 2021 08:19) (94% - 99%)  Daily Height in cm: 167.64 (13 Aug 2021 17:35)    Daily     PHYSICAL EXAM:    GENERAL: NAD  HEAD:  Atraumatic, Normocephalic  EYES: EOMI, PERRLA, conjunctiva and sclera clear  ENMT: No tonsillar erythema, exudates, or enlargement; Moist mucous membranes, Good dentition, No lesions  NECK: Supple, neck  veins full  NERVOUS SYSTEM:  Alert & Oriented X3, Good concentration; Motor Strength wnl upper and lower extremities  CHEST/LUNG:  Coarse BS  HEART: Regular rate and rhythm; No murmurs, rubs, or gallops  ABDOMEN: Soft, Nontender, Nondistended; Bowel sounds present, body wall and flank edema  EXTREMITIES:  Edema  LYMPH: No lymphadenopathy noted  SKIN: No rashes or lesions, pale      LABS:                        12.5   5.87  )-----------( 124      ( 14 Aug 2021 06:09 )             33.9     08-14    139  |  107  |  73<H>  ----------------------------<  221<H>  4.2   |  25  |  2.20<H>    Ca    8.5      14 Aug 2021 06:09    TPro  6.8  /  Alb  2.4<L>  /  TBili  0.8  /  DBili  x   /  AST  30  /  ALT  15  /  AlkPhos  46  08-14          ABG - ( 13 Aug 2021 19:45 )  pH, Arterial: 7.47  pH, Blood: x     /  pCO2: 30    /  pO2: 103   / HCO3: 22    / Base Excess: -1.9  /  SaO2: 99.3                  RADIOLOGY & ADDITIONAL TESTS:

## 2021-08-15 ENCOUNTER — TRANSCRIPTION ENCOUNTER (OUTPATIENT)
Age: 61
End: 2021-08-15

## 2021-08-15 DIAGNOSIS — U07.1 COVID-19: ICD-10-CM

## 2021-08-15 DIAGNOSIS — E11.65 TYPE 2 DIABETES MELLITUS WITH HYPERGLYCEMIA: ICD-10-CM

## 2021-08-15 DIAGNOSIS — J96.01 ACUTE RESPIRATORY FAILURE WITH HYPOXIA: ICD-10-CM

## 2021-08-15 LAB
ALBUMIN SERPL ELPH-MCNC: 2.3 G/DL — LOW (ref 3.3–5)
ALBUMIN SERPL ELPH-MCNC: 2.5 G/DL — LOW (ref 3.3–5)
ALP SERPL-CCNC: 47 U/L — SIGNIFICANT CHANGE UP (ref 40–120)
ALP SERPL-CCNC: 51 U/L — SIGNIFICANT CHANGE UP (ref 40–120)
ALT FLD-CCNC: 19 U/L — SIGNIFICANT CHANGE UP (ref 12–78)
ALT FLD-CCNC: 20 U/L — SIGNIFICANT CHANGE UP (ref 12–78)
ANION GAP SERPL CALC-SCNC: 10 MMOL/L — SIGNIFICANT CHANGE UP (ref 5–17)
AST SERPL-CCNC: 25 U/L — SIGNIFICANT CHANGE UP (ref 15–37)
AST SERPL-CCNC: 33 U/L — SIGNIFICANT CHANGE UP (ref 15–37)
BILIRUB DIRECT SERPL-MCNC: 0.2 MG/DL — SIGNIFICANT CHANGE UP (ref 0.05–0.2)
BILIRUB INDIRECT FLD-MCNC: 0.8 MG/DL — SIGNIFICANT CHANGE UP (ref 0.2–1)
BILIRUB SERPL-MCNC: 0.9 MG/DL — SIGNIFICANT CHANGE UP (ref 0.2–1.2)
BILIRUB SERPL-MCNC: 1 MG/DL — SIGNIFICANT CHANGE UP (ref 0.2–1.2)
BUN SERPL-MCNC: 68 MG/DL — HIGH (ref 7–23)
CALCIUM SERPL-MCNC: 8.3 MG/DL — LOW (ref 8.5–10.1)
CHLORIDE SERPL-SCNC: 100 MMOL/L — SIGNIFICANT CHANGE UP (ref 96–108)
CO2 SERPL-SCNC: 23 MMOL/L — SIGNIFICANT CHANGE UP (ref 22–31)
CREAT ?TM UR-MCNC: 84 MG/DL — SIGNIFICANT CHANGE UP
CREAT SERPL-MCNC: 2 MG/DL — HIGH (ref 0.5–1.3)
CREAT SERPL-MCNC: 2 MG/DL — HIGH (ref 0.5–1.3)
EOSINOPHIL NFR URNS MANUAL: NEGATIVE — SIGNIFICANT CHANGE UP
GLUCOSE SERPL-MCNC: 361 MG/DL — HIGH (ref 70–99)
HCT VFR BLD CALC: 36.6 % — LOW (ref 39–50)
HCT VFR BLD CALC: 36.8 % — LOW (ref 39–50)
HGB BLD-MCNC: 13 G/DL — SIGNIFICANT CHANGE UP (ref 13–17)
HGB BLD-MCNC: 13.1 G/DL — SIGNIFICANT CHANGE UP (ref 13–17)
INR BLD: 1.1 RATIO — SIGNIFICANT CHANGE UP (ref 0.88–1.16)
MAGNESIUM SERPL-MCNC: 3.3 MG/DL — HIGH (ref 1.6–2.6)
MCHC RBC-ENTMCNC: 29.1 PG — SIGNIFICANT CHANGE UP (ref 27–34)
MCHC RBC-ENTMCNC: 29.3 PG — SIGNIFICANT CHANGE UP (ref 27–34)
MCHC RBC-ENTMCNC: 35.3 GM/DL — SIGNIFICANT CHANGE UP (ref 32–36)
MCHC RBC-ENTMCNC: 35.8 GM/DL — SIGNIFICANT CHANGE UP (ref 32–36)
MCV RBC AUTO: 81.9 FL — SIGNIFICANT CHANGE UP (ref 80–100)
MCV RBC AUTO: 82.5 FL — SIGNIFICANT CHANGE UP (ref 80–100)
NRBC # BLD: 0 /100 WBCS — SIGNIFICANT CHANGE UP (ref 0–0)
NRBC # BLD: 0 /100 WBCS — SIGNIFICANT CHANGE UP (ref 0–0)
OSMOLALITY UR: 458 MOSM/KG — SIGNIFICANT CHANGE UP (ref 50–1200)
PHOSPHATE SERPL-MCNC: 3.4 MG/DL — SIGNIFICANT CHANGE UP (ref 2.5–4.5)
PLATELET # BLD AUTO: 145 K/UL — LOW (ref 150–400)
PLATELET # BLD AUTO: 179 K/UL — SIGNIFICANT CHANGE UP (ref 150–400)
POTASSIUM SERPL-MCNC: 4.7 MMOL/L — SIGNIFICANT CHANGE UP (ref 3.5–5.3)
POTASSIUM SERPL-SCNC: 4.7 MMOL/L — SIGNIFICANT CHANGE UP (ref 3.5–5.3)
POTASSIUM UR-SCNC: 18 MMOL/L — SIGNIFICANT CHANGE UP
PROT ?TM UR-MCNC: 8 MG/DL — SIGNIFICANT CHANGE UP (ref 0–12)
PROT SERPL-MCNC: 7 G/DL — SIGNIFICANT CHANGE UP (ref 6–8.3)
PROT SERPL-MCNC: 7.4 G/DL — SIGNIFICANT CHANGE UP (ref 6–8.3)
PROTHROM AB SERPL-ACNC: 12.8 SEC — SIGNIFICANT CHANGE UP (ref 10.6–13.6)
RBC # BLD: 4.46 M/UL — SIGNIFICANT CHANGE UP (ref 4.2–5.8)
RBC # BLD: 4.47 M/UL — SIGNIFICANT CHANGE UP (ref 4.2–5.8)
RBC # FLD: 13.6 % — SIGNIFICANT CHANGE UP (ref 10.3–14.5)
RBC # FLD: 13.6 % — SIGNIFICANT CHANGE UP (ref 10.3–14.5)
SODIUM SERPL-SCNC: 133 MMOL/L — LOW (ref 135–145)
SODIUM UR-SCNC: 25 MMOL/L — SIGNIFICANT CHANGE UP
UUN UR-MCNC: 871 MG/DL — SIGNIFICANT CHANGE UP
WBC # BLD: 5.86 K/UL — SIGNIFICANT CHANGE UP (ref 3.8–10.5)
WBC # BLD: 6.91 K/UL — SIGNIFICANT CHANGE UP (ref 3.8–10.5)
WBC # FLD AUTO: 5.86 K/UL — SIGNIFICANT CHANGE UP (ref 3.8–10.5)
WBC # FLD AUTO: 6.91 K/UL — SIGNIFICANT CHANGE UP (ref 3.8–10.5)

## 2021-08-15 PROCEDURE — 99254 IP/OBS CNSLTJ NEW/EST MOD 60: CPT

## 2021-08-15 PROCEDURE — 99233 SBSQ HOSP IP/OBS HIGH 50: CPT

## 2021-08-15 RX ORDER — REMDESIVIR 5 MG/ML
INJECTION INTRAVENOUS
Refills: 0 | Status: COMPLETED | OUTPATIENT
Start: 2021-08-15 | End: 2021-08-20

## 2021-08-15 RX ORDER — INSULIN LISPRO 100/ML
6 VIAL (ML) SUBCUTANEOUS
Refills: 0 | Status: DISCONTINUED | OUTPATIENT
Start: 2021-08-15 | End: 2021-08-16

## 2021-08-15 RX ORDER — INSULIN GLARGINE 100 [IU]/ML
25 INJECTION, SOLUTION SUBCUTANEOUS EVERY MORNING
Refills: 0 | Status: DISCONTINUED | OUTPATIENT
Start: 2021-08-16 | End: 2021-08-19

## 2021-08-15 RX ORDER — INSULIN GLARGINE 100 [IU]/ML
25 INJECTION, SOLUTION SUBCUTANEOUS ONCE
Refills: 0 | Status: COMPLETED | OUTPATIENT
Start: 2021-08-15 | End: 2021-08-15

## 2021-08-15 RX ORDER — REMDESIVIR 5 MG/ML
200 INJECTION INTRAVENOUS EVERY 24 HOURS
Refills: 0 | Status: COMPLETED | OUTPATIENT
Start: 2021-08-15 | End: 2021-08-16

## 2021-08-15 RX ORDER — INSULIN GLARGINE 100 [IU]/ML
25 INJECTION, SOLUTION SUBCUTANEOUS AT BEDTIME
Refills: 0 | Status: DISCONTINUED | OUTPATIENT
Start: 2021-08-15 | End: 2021-08-19

## 2021-08-15 RX ADMIN — HEPARIN SODIUM 5000 UNIT(S): 5000 INJECTION INTRAVENOUS; SUBCUTANEOUS at 15:33

## 2021-08-15 RX ADMIN — INSULIN GLARGINE 25 UNIT(S): 100 INJECTION, SOLUTION SUBCUTANEOUS at 14:41

## 2021-08-15 RX ADMIN — Medication 8: at 08:54

## 2021-08-15 RX ADMIN — Medication 12: at 12:13

## 2021-08-15 RX ADMIN — Medication 6 UNIT(S): at 12:12

## 2021-08-15 RX ADMIN — Medication 6 MILLIGRAM(S): at 05:48

## 2021-08-15 RX ADMIN — Medication 10: at 17:58

## 2021-08-15 RX ADMIN — Medication 6 UNIT(S): at 17:58

## 2021-08-15 RX ADMIN — HEPARIN SODIUM 5000 UNIT(S): 5000 INJECTION INTRAVENOUS; SUBCUTANEOUS at 05:48

## 2021-08-15 NOTE — PROGRESS NOTE ADULT - TIME BILLING
Chart review, examination, documentation, care coordination and counseling. Chart review, examination, documentation, care coordination and counseling.  extensive discussion with son of pt (Va) and patient today  all questions answered, regarding role of antibiotics, steroid treatment.

## 2021-08-15 NOTE — DISCHARGE NOTE PROVIDER - PROVIDER TOKENS
PROVIDER:[TOKEN:[[id number]],FOLLOWUP:[1-3 days],ESTABLISHEDPATIENT:[T]] PROVIDER:[TOKEN:[[id number]],FOLLOWUP:[1-3 days],ESTABLISHEDPATIENT:[T]],PROVIDER:[TOKEN:[[id number]]]

## 2021-08-15 NOTE — DISCHARGE NOTE PROVIDER - NSDCMRMEDTOKEN_GEN_ALL_CORE_FT
Edarbyclor 40 mg-12.5 mg oral tablet: 1 tab(s) orally once a day  Levemir 100 units/mL subcutaneous solution: 50 unit(s) subcutaneous once a day (at bedtime)   Levemir 100 units/mL subcutaneous solution: 50 unit(s) subcutaneous once a day (at bedtime)   Admelog SoloStar 100 units/mL injectable solution: 6 unit(s) subcutaneous 3 times a day (with meals)   alcohol swabs : Apply topically to affected area 4 times a day   Insulin Pen Needles, 4mm: 1 application subcutaneously 4 times a day. ** Use with insulin pen **   Levemir 100 units/mL subcutaneous solution: 25 unit(s) subcutaneous once a day (at bedtime)

## 2021-08-15 NOTE — PROGRESS NOTE ADULT - ASSESSMENT
SORAIDA/CKD  COVID19 PNA  Acute respiratory failure  DM  HTN      -Cr 1.35 on 2017 suggest CKDIII baseline   -SORAIDA clinically pre-renal. Cannot r/o COVID directly renal injury but less likely  -Avoid excessive IVF with COVID pulmonary status  -Urine studies pending   -Renal US if continue to worsen  -No indication for RRT at this time  -Awaiting today's lab result     Thank you for involving nephrology with the care of this patient

## 2021-08-15 NOTE — DISCHARGE NOTE PROVIDER - NSDCCPCAREPLAN_GEN_ALL_CORE_FT
PRINCIPAL DISCHARGE DIAGNOSIS  Diagnosis: COVID-19  Assessment and Plan of Treatment: You were admitted due to hypoxic respiratory failure due to the novel coronavirus (COVID-19). Upon discharge, you must self-quarantine for 20 days from the day of symptom onset. Please wear a face mask if you are around other individuals. Try to avoid contact with house members, family, and friends for the duration of this quarantine. Please follow up with your primary care physician within 3-4 days of your discharge from [facility]. Please take all medications as prescribed. If you experience any worsening or recurrence of your symptoms, particularly worsening or high fever, shortness of breathe, extreme fatigue, or bloody cough please call 9-1-1 immediately or report to the nearest Emergency Department. If you have any questions or concerns, please do not hesitate to call the hospital at [phone].  - Use PULSE OXIMETER to check your Oxygen saturation level at home. If saturating less than 88% or feeling short of breath, please seek medical attention.  - Follow up with your primary care doctor within 3 days      SECONDARY DISCHARGE DIAGNOSES  Diagnosis: Acute respiratory failure with hypoxia  Assessment and Plan of Treatment: See above    Diagnosis: SORAIDA (acute kidney injury)  Assessment and Plan of Treatment: You had some stress on your kidney on day of admission  You kidney function improved with fluids  -please follow up with primary doctor for monitoring of kidney function    Diagnosis: DM (diabetes mellitus)  Assessment and Plan of Treatment: You have diabetes with A1c of 10%  Prior to admission, you were taking Levemir 50units daily  After discharge, we recommend that you take ______  Please always check blood glucose before giving insulin to yourself  It if important that you see your primary doctor to help monitor your blood glucose and adjust insulin regimen as appropriate.     PRINCIPAL DISCHARGE DIAGNOSIS  Diagnosis: COVID-19  Assessment and Plan of Treatment: You were admitted due to hypoxic respiratory failure due to the novel coronavirus (COVID-19). Upon discharge, recommend quarantine until 8/26 (20 days from symptom onset). Please wear a face mask if you are around other individuals. Try to avoid contact with house members, family, and friends for the duration of this quarantine. Please follow up with your primary care physician within 3-4 days of your discharge from [facility]. Please take all medications as prescribed. If you experience any worsening or recurrence of your symptoms, particularly worsening or high fever, shortness of breathe, extreme fatigue, or bloody cough please call 9-1-1 immediately or report to the nearest Emergency Department.   - Use PULSE OXIMETER to check your Oxygen saturation level at home. If saturating less than 88% or feeling severely short of breath, recommend that you seek medical attention.        SECONDARY DISCHARGE DIAGNOSES  Diagnosis: Acute respiratory failure with hypoxia  Assessment and Plan of Treatment: See above    Diagnosis: SORAIDA (acute kidney injury)  Assessment and Plan of Treatment: You had some stress on your kidney on day of admission  You kidney function improved with fluids  -please follow up with primary doctor for monitoring of kidney function    Diagnosis: DM (diabetes mellitus)  Assessment and Plan of Treatment: You have diabetes with A1c of 10%  Prior to admission, you were taking Levemir 50units daily  After discharge, we recommend that you take ______  Please always check blood glucose before giving insulin to yourself  It if important that you see your primary doctor to help monitor your blood glucose and adjust insulin regimen as appropriate.     PRINCIPAL DISCHARGE DIAGNOSIS  Diagnosis: COVID-19  Assessment and Plan of Treatment: You were admitted due to hypoxic respiratory failure due to the novel coronavirus (COVID-19). Upon discharge, recommend quarantine until 8/26 (20 days from symptom onset). Please wear a face mask if you are around other individuals. Try to avoid contact with house members, family, and friends for the duration of this quarantine. Please follow up with your primary care physician within 3-4 days of your discharge from Health system. Please take all medications as prescribed. If you experience any worsening or recurrence of your symptoms, particularly worsening or high fever, shortness of breathe, extreme fatigue, or bloody cough please call 9-1-1 immediately or report to the nearest Emergency Department.   - Use PULSE OXIMETER to check your Oxygen saturation level at home. If saturating less than 88% or feeling severely short of breath, recommend that you seek medical attention.        SECONDARY DISCHARGE DIAGNOSES  Diagnosis: Acute respiratory failure with hypoxia  Assessment and Plan of Treatment: See above    Diagnosis: SORAIDA (acute kidney injury)  Assessment and Plan of Treatment: You had some stress on your kidney on day of admission  You kidney function improved with fluids  -please follow up with primary doctor for monitoring of kidney function  -hold BP meds until recommended to resume by your primary care provider.    Diagnosis: DM (diabetes mellitus)  Assessment and Plan of Treatment: You have diabetes with A1c of 10%  Recommend use insulin regimen as rx'd.   Please always check blood glucose before giving insulin to yourself  It if important that you see your primary doctor to help monitor your blood glucose and adjust insulin regimen as appropriate.

## 2021-08-15 NOTE — DISCHARGE NOTE PROVIDER - HOSPITAL COURSE
HPI:  60 yo Male patient with PMHx of HTN, DM type 2 (insulin dependent) presents today complaining SOB and generalized weakness since 2021. Pt endorses that came back from [place] on  and tested positive for COVID-19, states he was in contact with a friend that tested positive. Since 2021 has been progressively worsening  generalized weakness and exertional dyspnea, associated with cough, decreased appetite with poor oral intake, decreases smell and one episode of vomiting today. Patient before was able to walk long distances, climb stairs w/o dyspna  At this time he denies  fever, chills, chest pain, palpitations, abdominal pain, diarrhea, constipation, urinary frequency, urgency, hematuria, hematochezia, melena or dysuria, changes in vision, dizziness, numbness, tingling. He is not vaccinated against covid19    ED course: VS  Afebrile, HR: 68, BP: 113/59, SpO2: 94%-> 98% 3 NC RR: 19  Labs significant for Neutrophils 84% D-dimer 323 Procalcitonin 0.41, Na 132 BUN/creatinine: 84/2.80, Glucose 427 GFR: 27, lactate 2.3. AB.4/30/103/22/30.0  Patient received 1 Lt NS bolus in the ED  (13 Aug 2021 19:56)    Hospital course:  Patient admitted for acute hypoxic resp failure due to covid pneumonia requiring 2-3 L  NC on admission. Tested positive on 21, was asymptomatic at the time (tested himself because of  an exposure) then symptoms started 2 days  after that. On admission, dexamethasone started. Remdesivir held due to SORAIDA. Pt had creatinine of 2.8 on admission, baseline unclear at the time, most recent record available showed cr 1.3  in 2017. Renal function improved after IV fluids. Infectious disease discussed risks and benefits with patient and he opted to start remdesivir. A1c was 10%,  endo consulted, blood glucose elevated giving steroid use, insulin dose adjusted accordingly. Pt has hx of hypertension, on edarbyclor prior to admission. Medication held on admission due to SORAIDA and hypotension.     Consultants:   Endo: Dr Perlman  Renal: Dr Foy  Infectious disease: Dr Suh    Time spent on discharge:   HPI:  62 yo Male patient with PMHx of HTN, DM type 2 (insulin dependent) presents today complaining SOB and generalized weakness since 08/11/2021. Pt endorses that came back from [place] on August 5th and tested positive for COVID-19, states he was in contact with a friend that tested positive. Since 08/11/2021 has been progressively worsening  generalized weakness and exertional dyspnea, associated with cough, decreased appetite with poor oral intake, decreases smell and one episode of vomiting today. Patient before was able to walk long distances, climb stairs w/o dyspna    Hospital course:  Patient admitted for acute hypoxic resp failure due to covid pneumonia requiring 2-3 L  NC on admission. Tested positive on 8/5/21, was asymptomatic at the time (tested himself because of  an exposure) then symptoms started 2 days  after that. On admission, dexamethasone started. Remdesivir held due to SORAIDA. Pt had creatinine of 2.8 on admission, baseline unclear at the time, most recent record available showed cr 1.3  in 2017. Renal function improved after IV fluids. Infectious disease discussed risks and benefits with patient and he opted to start remdesivir. A1c was 10%, endo consulted, blood glucose elevated giving steroid use, insulin dose adjusted accordingly. Pt needs DM2 supplies for home. Pt has hx of hypertension, on edarbyclor prior to admission. Medication held on admission due to SORAIDA and hypotension. No indication to continue antihypertensives for now. Recommend low salt diet and follow up with PCP to determine need for resuming antihypertensives.     Consultants:   Endo: Dr Perlman  Renal: Dr Foy  Infectious disease: Dr Vikash ELLINGTON, the attending physician, was physically present for the key portions of the evaluation and management (E/M) service provided.  I agree with the above findings which I have reviewed and edited where appropriate.  The total amount of time spent preparing the discharge and follow up plan was 45 minutes.     T(C): 36.7 (08-23-21 @ 13:11), Max: 36.8 (08-23-21 @ 04:33)  HR: 56 (08-23-21 @ 13:11) (42 - 62)  BP: 97/60 (08-23-21 @ 13:11) (97/60 - 125/77)  RR: 17 (08-23-21 @ 13:11) (17 - 17)  SpO2: 99% (08-23-21 @ 13:11) (94% - 99%)    PHYSICAL EXAM:  GENERAL: patient appears more comfortable, no distress  ENMT: oropharynx clear without erythema, dry mucous membranes  LUNGS: reduced air entry bilaterally, coarse breath sounds throughout, no wheezing  HEART: soft S1/S2, regular rate and rhythm, no murmurs noted, no noted edema to b/l LE  GASTROINTESTINAL: abdomen is soft, nontender, nondistended, normoactive bowel sounds, no palpable masses  MUSCULOSKELETAL: no clubbing or cyanosis, no obvious deformity, no calf tenderness to palpation b/l  NEUROLOGIC: awake, alert, readily interactive, good muscle tone in 4 extremities   HPI:  62 yo Male patient with PMHx of HTN, DM type 2 (insulin dependent) presents today complaining SOB and generalized weakness since 08/11/2021. Pt endorses that came back from [place] on August 5th and tested positive for COVID-19, states he was in contact with a friend that tested positive. Since 08/11/2021 has been progressively worsening  generalized weakness and exertional dyspnea, associated with cough, decreased appetite with poor oral intake, decreases smell and one episode of vomiting today. Patient before was able to walk long distances, climb stairs w/o dyspna    Hospital course:  Patient admitted for acute hypoxic resp failure due to covid pneumonia requiring 2-3 L  NC on admission. Tested positive on 8/5/21, was asymptomatic at the time (tested himself because of  an exposure) then symptoms started 2 days  after that. On admission, dexamethasone started. Remdesivir held due to SORAIDA. Pt had creatinine of 2.8 on admission, baseline unclear at the time, most recent record available showed cr 1.3  in 2017. Renal function improved after IV fluids. Infectious disease discussed risks and benefits with patient and he opted to start remdesivir. A1c was 10%, endo consulted, blood glucose elevated giving steroid use, insulin dose adjusted accordingly. Pt needs DM2 supplies for home. Pt has hx of hypertension, on edarbyclor prior to admission. Medication held on admission due to SORAIDA and hypotension. No indication to continue antihypertensives for now. Recommend low salt diet and follow up with PCP to determine need for resuming antihypertensives. INsulin regimen adjusted and sent to pharmacy.     Consultants:   Endo: Dr Perlman  Renal: Dr Foy  Infectious disease: Dr Vikash ELLINGTON, the attending physician, was physically present for the key portions of the evaluation and management (E/M) service provided.  I agree with the above findings which I have reviewed and edited where appropriate.  The total amount of time spent preparing the discharge and follow up plan was 45 minutes.     T(C): 36.7 (08-23-21 @ 13:11), Max: 36.8 (08-23-21 @ 04:33)  HR: 56 (08-23-21 @ 13:11) (42 - 62)  BP: 97/60 (08-23-21 @ 13:11) (97/60 - 125/77)  RR: 17 (08-23-21 @ 13:11) (17 - 17)  SpO2: 99% (08-23-21 @ 13:11) (94% - 99%)    PHYSICAL EXAM:  GENERAL: patient appears more comfortable, no distress  ENMT: oropharynx clear without erythema, dry mucous membranes  LUNGS: reduced air entry bilaterally, coarse breath sounds throughout, no wheezing  HEART: soft S1/S2, regular rate and rhythm, no murmurs noted, no noted edema to b/l LE  GASTROINTESTINAL: abdomen is soft, nontender, nondistended, normoactive bowel sounds, no palpable masses  MUSCULOSKELETAL: no clubbing or cyanosis, no obvious deformity, no calf tenderness to palpation b/l  NEUROLOGIC: awake, alert, readily interactive, good muscle tone in 4 extremities

## 2021-08-15 NOTE — PROGRESS NOTE ADULT - SUBJECTIVE AND OBJECTIVE BOX
Patient is a 61y old  Male who presents with a chief complaint of acute hypoxic respiratory failure (14 Aug 2021 16:47)      INTERVAL HPI/OVERNIGHT EVENTS: Patient seen and examined at bedside. No overnight events occurred. Patient has no complaints at this time. Denies fevers, chills, headache, lightheadedness, chest pain, dyspnea, abdominal pain, n/v/d/c.    MEDICATIONS  (STANDING):  dexAMETHasone     Tablet 6 milliGRAM(s) Oral daily  dextrose 40% Gel 15 Gram(s) Oral once  dextrose 5%. 1000 milliLiter(s) (50 mL/Hr) IV Continuous <Continuous>  dextrose 5%. 1000 milliLiter(s) (100 mL/Hr) IV Continuous <Continuous>  dextrose 50% Injectable 25 Gram(s) IV Push once  dextrose 50% Injectable 12.5 Gram(s) IV Push once  dextrose 50% Injectable 25 Gram(s) IV Push once  glucagon  Injectable 1 milliGRAM(s) IntraMuscular once  heparin   Injectable 5000 Unit(s) SubCutaneous every 8 hours  insulin lispro (ADMELOG) corrective regimen sliding scale   SubCutaneous three times a day before meals    MEDICATIONS  (PRN):  acetaminophen   Tablet .. 650 milliGRAM(s) Oral every 6 hours PRN Temp greater or equal to 38.5C (101.3F), Mild Pain (1 - 3)  ondansetron Injectable 4 milliGRAM(s) IV Push every 8 hours PRN Nausea and/or Vomiting      Allergies    calcium channel blockers (Other)    Intolerances        REVIEW OF SYSTEMS:  CONSTITUTIONAL: No fever or chills  HEENT:  No headache, no sore throat  RESPIRATORY: No cough, wheezing, or shortness of breath  CARDIOVASCULAR: No chest pain, palpitations  GASTROINTESTINAL: No abd pain, nausea, vomiting, or diarrhea  GENITOURINARY: No dysuria, frequency, or hematuria  NEUROLOGICAL: no focal weakness or dizziness  MUSCULOSKELETAL: no myalgias     Vital Signs Last 24 Hrs  T(C): 36.8 (15 Aug 2021 05:20), Max: 37.6 (14 Aug 2021 11:47)  T(F): 98.2 (15 Aug 2021 05:20), Max: 99.6 (14 Aug 2021 11:47)  HR: 84 (15 Aug 2021 05:20) (55 - 84)  BP: 108/69 (15 Aug 2021 05:20) (100/56 - 110/60)  BP(mean): --  RR: 18 (15 Aug 2021 05:20) (16 - 18)  SpO2: 92% (15 Aug 2021 05:20) (92% - 100%)    PHYSICAL EXAM:  GENERAL: NAD  HEENT:  anicteric, moist mucous membranes  CHEST/LUNG:  CTA b/l, no rales, wheezes, or rhonchi  HEART:  RRR, S1, S2  ABDOMEN:  BS+, soft, nontender, nondistended  EXTREMITIES: no edema, cyanosis, or calf tenderness  NERVOUS SYSTEM: answers questions and follows commands appropriately    LABS:    CBC Full  -  ( 14 Aug 2021 06:09 )  WBC Count : 5.87 K/uL  Hemoglobin : 12.5 g/dL  Hematocrit : 33.9 %  Platelet Count - Automated : 124 K/uL  Mean Cell Volume : 80.9 fl  Mean Cell Hemoglobin : 29.8 pg  Mean Cell Hemoglobin Concentration : 36.9 gm/dL  Auto Neutrophil # : 5.06 K/uL  Auto Lymphocyte # : 0.53 K/uL  Auto Monocyte # : 0.23 K/uL  Auto Eosinophil # : 0.00 K/uL  Auto Basophil # : 0.01 K/uL  Auto Neutrophil % : 86.2 %  Auto Lymphocyte % : 9.0 %  Auto Monocyte % : 3.9 %  Auto Eosinophil % : 0.0 %  Auto Basophil % : 0.2 %      Ca    8.5        14 Aug 2021 06:09          CAPILLARY BLOOD GLUCOSE      POCT Blood Glucose.: 409 mg/dL (15 Aug 2021 01:12)  POCT Blood Glucose.: 436 mg/dL (15 Aug 2021 01:11)  POCT Blood Glucose.: 418 mg/dL (14 Aug 2021 23:39)  POCT Blood Glucose.: 457 mg/dL (14 Aug 2021 23:38)  POCT Blood Glucose.: 441 mg/dL (14 Aug 2021 22:05)  POCT Blood Glucose.: 399 mg/dL (14 Aug 2021 22:04)  POCT Blood Glucose.: 561 mg/dL (14 Aug 2021 21:51)  POCT Blood Glucose.: 383 mg/dL (14 Aug 2021 16:51)  POCT Blood Glucose.: 317 mg/dL (14 Aug 2021 11:37)  POCT Blood Glucose.: 215 mg/dL (14 Aug 2021 08:23)        Culture - Blood (collected 08-14-21 @ 01:02)  Source: .Blood Blood  Preliminary Report (08-15-21 @ 02:01):    No growth to date.    Culture - Blood (collected 08-14-21 @ 01:02)  Source: .Blood Blood  Preliminary Report (08-15-21 @ 02:01):    No growth to date.        RADIOLOGY & ADDITIONAL TESTS:    Personally reviewed.     Consultant(s) Notes Reviewed:  [x] YES  [ ] NO     Patient is a 61y old  Male who presents with a chief complaint of acute hypoxic respiratory failure (14 Aug 2021 16:47)    INTERVAL HPI/OVERNIGHT EVENTS: Patient seen and examined at bedside. No overnight events occurred. Patient has no complaints at this time. Denies fevers, chills, headache, lightheadedness, chest pain, abdominal pain, n/v/d/c. +mild cough, no sob on nasal canula, +fatigue    MEDICATIONS  (STANDING):  dexAMETHasone     Tablet 6 milliGRAM(s) Oral daily  dextrose 40% Gel 15 Gram(s) Oral once  dextrose 5%. 1000 milliLiter(s) (50 mL/Hr) IV Continuous <Continuous>  dextrose 5%. 1000 milliLiter(s) (100 mL/Hr) IV Continuous <Continuous>  dextrose 50% Injectable 25 Gram(s) IV Push once  dextrose 50% Injectable 12.5 Gram(s) IV Push once  dextrose 50% Injectable 25 Gram(s) IV Push once  glucagon  Injectable 1 milliGRAM(s) IntraMuscular once  heparin   Injectable 5000 Unit(s) SubCutaneous every 8 hours  insulin lispro (ADMELOG) corrective regimen sliding scale   SubCutaneous three times a day before meals    MEDICATIONS  (PRN):  acetaminophen Tablet .. 650 milliGRAM(s) Oral every 6 hours PRN Temp greater or equal to 38.5C (101.3F), Mild Pain (1 - 3)  ondansetron Injectable 4 milliGRAM(s) IV Push every 8 hours PRN Nausea and/or Vomiting      Allergies  calcium channel blockers (Other)  Intolerances    REVIEW OF SYSTEMS: see hpi above    Vital Signs Last 24 Hrs  T(C): 36.8 (15 Aug 2021 05:20), Max: 37.6 (14 Aug 2021 11:47)  T(F): 98.2 (15 Aug 2021 05:20), Max: 99.6 (14 Aug 2021 11:47)  HR: 84 (15 Aug 2021 05:20) (55 - 84)  BP: 108/69 (15 Aug 2021 05:20) (100/56 - 110/60)  BP(mean): --  RR: 18 (15 Aug 2021 05:20) (16 - 18)  SpO2: 92% (15 Aug 2021 05:20) (92% - 100%)    PHYSICAL EXAM:  GENERAL: NAD  HEENT:  anicteric, moist mucous membranes  CHEST/LUNG: no rales, wheezes, or rhonchi  HEART:  RRR, S1, S2  ABDOMEN:  BS+, soft, nontender, nondistended  EXTREMITIES: no edema, cyanosis, or calf tenderness  NERVOUS SYSTEM: answers questions and follows commands appropriately    LABS:    CBC Full  -  ( 14 Aug 2021 06:09 )  WBC Count : 5.87 K/uL  Hemoglobin : 12.5 g/dL  Hematocrit : 33.9 %  Platelet Count - Automated : 124 K/uL  Mean Cell Volume : 80.9 fl  Mean Cell Hemoglobin : 29.8 pg  Mean Cell Hemoglobin Concentration : 36.9 gm/dL    Ca    8.5        14 Aug 2021 06:09    CAPILLARY BLOOD GLUCOSE      POCT Blood Glucose.: 409 mg/dL (15 Aug 2021 01:12)  POCT Blood Glucose.: 436 mg/dL (15 Aug 2021 01:11)  POCT Blood Glucose.: 418 mg/dL (14 Aug 2021 23:39)  POCT Blood Glucose.: 457 mg/dL (14 Aug 2021 23:38)  POCT Blood Glucose.: 441 mg/dL (14 Aug 2021 22:05)  POCT Blood Glucose.: 399 mg/dL (14 Aug 2021 22:04)  POCT Blood Glucose.: 561 mg/dL (14 Aug 2021 21:51)  POCT Blood Glucose.: 383 mg/dL (14 Aug 2021 16:51)  POCT Blood Glucose.: 317 mg/dL (14 Aug 2021 11:37)  POCT Blood Glucose.: 215 mg/dL (14 Aug 2021 08:23)        Culture - Blood (collected 08-14-21 @ 01:02)  Source: .Blood Blood  Preliminary Report (08-15-21 @ 02:01):    No growth to date.    Culture - Blood (collected 08-14-21 @ 01:02)  Source: .Blood Blood  Preliminary Report (08-15-21 @ 02:01):    No growth to date.        RADIOLOGY & ADDITIONAL TESTS:    Personally reviewed.     Consultant(s) Notes Reviewed:  [x] YES  [ ] NO     Patient is a 61y old  Male who presents with a chief complaint of acute hypoxic respiratory failure (14 Aug 2021 16:47)    INTERVAL HPI/OVERNIGHT EVENTS: Patient seen and examined at bedside. No overnight events occurred. Patient has no complaints at this time. Denies fevers, chills, headache, lightheadedness, chest pain, abdominal pain, n/v/d/c. +mild cough, no sob on nasal canula, +fatigue.    MEDICATIONS  (STANDING):  dexAMETHasone     Tablet 6 milliGRAM(s) Oral daily  dextrose 40% Gel 15 Gram(s) Oral once  dextrose 5%. 1000 milliLiter(s) (50 mL/Hr) IV Continuous <Continuous>  dextrose 5%. 1000 milliLiter(s) (100 mL/Hr) IV Continuous <Continuous>  dextrose 50% Injectable 25 Gram(s) IV Push once  dextrose 50% Injectable 12.5 Gram(s) IV Push once  dextrose 50% Injectable 25 Gram(s) IV Push once  glucagon  Injectable 1 milliGRAM(s) IntraMuscular once  heparin   Injectable 5000 Unit(s) SubCutaneous every 8 hours  insulin lispro (ADMELOG) corrective regimen sliding scale   SubCutaneous three times a day before meals    MEDICATIONS  (PRN):  acetaminophen Tablet .. 650 milliGRAM(s) Oral every 6 hours PRN Temp greater or equal to 38.5C (101.3F), Mild Pain (1 - 3)  ondansetron Injectable 4 milliGRAM(s) IV Push every 8 hours PRN Nausea and/or Vomiting      Allergies  calcium channel blockers (Other)  Intolerances    REVIEW OF SYSTEMS: see hpi above    Vital Signs Last 24 Hrs  T(C): 36.8 (15 Aug 2021 05:20), Max: 37.6 (14 Aug 2021 11:47)  T(F): 98.2 (15 Aug 2021 05:20), Max: 99.6 (14 Aug 2021 11:47)  HR: 84 (15 Aug 2021 05:20) (55 - 84)  BP: 108/69 (15 Aug 2021 05:20) (100/56 - 110/60)  BP(mean): --  RR: 18 (15 Aug 2021 05:20) (16 - 18)  SpO2: 92% (15 Aug 2021 05:20) (92% - 100%)    PHYSICAL EXAM:  GENERAL: NAD  HEENT:  anicteric, moist mucous membranes  CHEST/LUNG: no rales, wheezes, or rhonchi  HEART:  RRR, S1, S2  ABDOMEN:  BS+, soft, nontender, nondistended  EXTREMITIES: no edema, cyanosis, or calf tenderness  NERVOUS SYSTEM: answers questions and follows commands appropriately    LABS:    CBC Full  -  ( 14 Aug 2021 06:09 )  WBC Count : 5.87 K/uL  Hemoglobin : 12.5 g/dL  Hematocrit : 33.9 %  Platelet Count - Automated : 124 K/uL  Mean Cell Volume : 80.9 fl  Mean Cell Hemoglobin : 29.8 pg  Mean Cell Hemoglobin Concentration : 36.9 gm/dL    Ca    8.5        14 Aug 2021 06:09    CAPILLARY BLOOD GLUCOSE      POCT Blood Glucose.: 409 mg/dL (15 Aug 2021 01:12)  POCT Blood Glucose.: 436 mg/dL (15 Aug 2021 01:11)  POCT Blood Glucose.: 418 mg/dL (14 Aug 2021 23:39)  POCT Blood Glucose.: 457 mg/dL (14 Aug 2021 23:38)  POCT Blood Glucose.: 441 mg/dL (14 Aug 2021 22:05)  POCT Blood Glucose.: 399 mg/dL (14 Aug 2021 22:04)  POCT Blood Glucose.: 561 mg/dL (14 Aug 2021 21:51)  POCT Blood Glucose.: 383 mg/dL (14 Aug 2021 16:51)  POCT Blood Glucose.: 317 mg/dL (14 Aug 2021 11:37)  POCT Blood Glucose.: 215 mg/dL (14 Aug 2021 08:23)        Culture - Blood (collected 08-14-21 @ 01:02)  Source: .Blood Blood  Preliminary Report (08-15-21 @ 02:01):    No growth to date.    Culture - Blood (collected 08-14-21 @ 01:02)  Source: .Blood Blood  Preliminary Report (08-15-21 @ 02:01):    No growth to date.        RADIOLOGY & ADDITIONAL TESTS:    Personally reviewed.     Consultant(s) Notes Reviewed:  [x] YES  [ ] NO

## 2021-08-15 NOTE — DISCHARGE NOTE PROVIDER - CARE PROVIDER_API CALL
Lombardi, Adrian C (MD)  Family Medicine  [address] [place] [address], [address]  [place] [address]  Phone: [phone]  Fax: [phone]  Established Patient  Follow Up Time: 1-3 days   Lombardi, Adrian C (MD)  Family Medicine  [address] [facility], [address]  [place] [address]  Phone: [phone]  Fax: [phone]  Established Patient  Follow Up Time: 1-3 days    Perlman, Craig D  [address], [address]  [place] [address]  Phone: [phone]  Fax: [phone]  Follow Up Time:

## 2021-08-15 NOTE — DISCHARGE NOTE PROVIDER - INSTRUCTIONS
- low salt  - low cholesterol  - low glucose/sugar  - low carb  - recommend a diet that is low in concentrated potassium

## 2021-08-15 NOTE — PROGRESS NOTE ADULT - ASSESSMENT
incomplete  Overnight events reviewed. Blood glucose elevated, likely due to steroid use  RN to check AM glucose and notify MD  Will adjust insulin regimen 62 yo Male patient with PMHx of HTN T2DM (insulin dependent) presents today complaining SOB and generalized weakness since 2021. Tested positive for COVID-19 on 2021. Patient admitted for acute respiratory failure secondary to COVID-19 PNA.    # Acute respiratory failure secondary to COVID-19 PNA  - admit w/ remote telemetry monitoring  - on nasal canula, maintain sats >92%  - D- dimer 323, procalcitonin 0.41, Lactate 2.3. AB.4/30/103/22/30.0  - Decadron 10mg po x 10 days  - Will hold off on Remdesivir- awaiting ID recs given acute kidney injury   - Tylenol prn myalgias, fever  - AC with heparin 5000 sq q8   - Full code   - ID Dr. Aguayo consulted, discussed, Dr Suh to see pt.     # SORAIDA on CKD, probably prerenal in the setting of dehydration   - (cr 2.8 on admission). from prev chart review, last Creatinine was 1.3 in 2017 (unclear if this is baseline as it was years ago, but this is only baseline available for comparison), f/u nephrology  - Hold home Edarbyclor  - monitor serum creatinine & urine output  - Consult Nephro- Dr. JOLENE Duenas     # DM type 2  - Elevated serum glucose 427 on admission   - Uses at home Levemir 50 units at bedtime   - Will continue Levemir 35 units at bedtime as he has decreased PO intake he states  - may need to increase lantus given he will be on PO steroids  - f/u HgbA1c  - moderate dose coverage scale  - Accu checks w/ hypoglycemic protocol    Thrombocytopenia  - resolved, plts now 179  - likely 2/2 to covid-19 infxn    # HTN   - Hypotensive in the ED likely secondary to dehydration given poor intake   - patient takes Edarbclor 40-12.5 mg qd   - Will hold in the setting of SORAIDA and  hypotension   - Monitor BP & titrate BP meds PRN  -now normotensive    # DVT Prophylaxis   - Heparin 5000 SQ q8 60 yo Male patient with PMHx of HTN T2DM (insulin dependent) presents today complaining SOB and generalized weakness since 2021. Tested positive for COVID-19 on 2021. Patient admitted for acute respiratory failure secondary to COVID-19 PNA.    # Acute respiratory failure secondary to COVID-19 PNA  - admit w/ remote telemetry monitoring  - on nasal canula, maintain sats >92%  - D- dimer 323, procalcitonin 0.41, Lactate 2.3. AB.4/30/103/22/30.0  - Decadron 10mg po x 10 days  - Will hold off on Remdesivir- awaiting ID recs given acute kidney injury   - Tylenol prn myalgias, fever  - AC with heparin 5000 sq q8   - Full code   - ID Dr. Aguayo consulted, discussed, Dr Suh to see pt.     # SORAIDA on CKD, probably prerenal in the setting of dehydration   - improving renal function, Creat 2.8 --> 2.2 -->2.0  -(cr 2.8 on admission). From prev chart review, last Creatinine was 1.3 in 2017 (unclear if this is baseline as it was years ago, but this is only baseline available for comparison), f/u nephrology  - Hold home Edarbyclor  - monitor serum creatinine & urine output  - Consult Nephro- Dr. JOLENE Duenas     # DM type 2, uncontrolled  - uncontrolled blood glucose in setting of steroid use  - Uses at home Levemir 50 units at bedtime - continue as lantus 25units bid  - premeal insulin and moderate dose coverage scale ordered (monitor today, pt might need dose adjustment)  - endo consulted   - f/u HgbA1c - 10.6%  - Accu checks w/ hypoglycemic protocol    Thrombocytopenia, resolved  - resolved, plts now 179  - likely 2/2 to covid-19 infxn    # Hypertension, chronic  - Hypotensive in the ED likely secondary to dehydration given poor intake   - patient takes Edarbyclor 40-12.5 mg qd  (azilsartan/chlorthalidone)  - Will hold in the setting of SORAIDA and  hypotension   - Monitor BP. If BPs elevated and pt still with elevated creatinine, consider amlodipine for bp control    # DVT Prophylaxis   - Heparin 5000 SQ q8

## 2021-08-15 NOTE — PROGRESS NOTE ADULT - SUBJECTIVE AND OBJECTIVE BOX
Patient is a 61y old  Male who presents with a chief complaint of acute hypoxic respiratory failure (13 Aug 2021 19:56)       HPI:  62 yo Male patient with PMHx of HTN, DM type 2 (insulin dependent) presents today complaining SOB and generalized weakness since 2021. Pt endorses that came back from [place] on  and tested positive for COVID-19, states he was in contact with a friend that tested positive. Since 2021 has been progressively worsening  generalized weakness and exertional dyspnea, associated with cough, decreased appetite with poor oral intake, decreases smell and one episode of vomiting today. Patient before was able to walk long distances, climb stairs w/o dyspna  At this time he denies  fever, chills, chest pain, palpitations, abdominal pain, diarrhea, constipation, urinary frequency, urgency, hematuria, hematochezia, melena or dysuria, changes in vision, dizziness, numbness, tingling. He is not vaccinated against covid19    ED course: VS  Afebrile, HR: 68, BP: 113/59, SpO2: 94%-> 98% 3 NC RR: 19  Labs significant for Neutrophils 84% D-dimer 323 Procalcitonin 0.41, Na 132 BUN/creatinine: 84/2.80, Glucose 427 GFR: 27, lactate 2.3. AB.4/30/103/22/30.0  Patient received 1 Lt NS bolus in the ED  (13 Aug 2021 19:56)    Renal is being consulted for SORAIDA/CKD. No urinary c/o.        PAST MEDICAL & SURGICAL HISTORY:  Hypertension    Diabetes mellitus    No significant past surgical history         FAMILY HISTORY:  FH: HTN (hypertension) (Father, Mother)    NC    Social History:Non smoker    MEDICATIONS  (STANDING):  dexAMETHasone     Tablet 6 milliGRAM(s) Oral daily  dextrose 40% Gel 15 Gram(s) Oral once  dextrose 5%. 1000 milliLiter(s) (50 mL/Hr) IV Continuous <Continuous>  dextrose 5%. 1000 milliLiter(s) (100 mL/Hr) IV Continuous <Continuous>  dextrose 50% Injectable 25 Gram(s) IV Push once  dextrose 50% Injectable 12.5 Gram(s) IV Push once  dextrose 50% Injectable 25 Gram(s) IV Push once  glucagon  Injectable 1 milliGRAM(s) IntraMuscular once  heparin   Injectable 5000 Unit(s) SubCutaneous every 8 hours  insulin glargine Injectable (LANTUS) 35 Unit(s) SubCutaneous at bedtime  insulin lispro (ADMELOG) corrective regimen sliding scale   SubCutaneous three times a day before meals  insulin lispro (ADMELOG) corrective regimen sliding scale   SubCutaneous at bedtime    MEDICATIONS  (PRN):  acetaminophen   Tablet .. 650 milliGRAM(s) Oral every 6 hours PRN Temp greater or equal to 38.5C (101.3F), Mild Pain (1 - 3)  ondansetron Injectable 4 milliGRAM(s) IV Push every 8 hours PRN Nausea and/or Vomiting   Meds reviewed    Allergies    calcium channel blockers (Other)    Intolerances         REVIEW OF SYSTEMS:    CONSTITUTIONAL:  No weight loss   EYES: No eye pain, visual disturbances, or discharge  ENMT:  No difficulty hearing, tinnitus, vertigo; No sinus or throat pain  NECK: No pain or stiffness  BREASTS: No pain, masses, or nipple discharge  RESPIRATORY: No SOB. No wheeze. No STEVENS  CARDIOVASCULAR: No chest pain, palpitations, dizziness,   GASTROINTESTINAL: No abdominal or epigastric pain. No nausea, vomiting, or hematemesis; No diarrhea or constipation. No melena or hematochezia.Trunckal obesity  GENITOURINARY: No dysuria, frequency, hematuria, or incontinence  NEUROLOGICAL: No headaches, memory loss, loss of strength, numbness, or tremors  SKIN: Diffuse erythema, no blisters  LYMPH NODES: No enlarged glands  ENDOCRINE: No heat or cold intolerance; No hair loss  MUSCULOSKELETAL: No joint pain or swelling   PSYCHIATRIC: No depression, anxiety, mood swings, or difficulty sleeping  HEME/LYMPH: No easy bruising, or bleeding gums  ALLERY AND IMMUNOLOGIC: No hives or eczema      Vital Signs Last 24 Hrs  T(C): 36.8 (15 Aug 2021 05:20), Max: 37.6 (14 Aug 2021 11:47)  T(F): 98.2 (15 Aug 2021 05:20), Max: 99.6 (14 Aug 2021 11:47)  HR: 84 (15 Aug 2021 05:20) (55 - 84)  BP: 108/69 (15 Aug 2021 05:20) (100/56 - 108/69)  BP(mean): --  RR: 18 (15 Aug 2021 05:20) (18 - 18)  SpO2: 92% (15 Aug 2021 05:20) (92% - 100%)    PHYSICAL EXAM:    GENERAL: NAD  HEAD:  Atraumatic, Normocephalic  EYES: EOMI, PERRLA, conjunctiva and sclera clear  ENMT: No tonsillar erythema, exudates, or enlargement; Moist mucous membranes, Good dentition, No lesions  NECK: Supple, neck  veins full  NERVOUS SYSTEM:  Alert & Oriented X3, Good concentration; Motor Strength wnl upper and lower extremities  CHEST/LUNG:  Coarse BS  HEART: Regular rate and rhythm; No murmurs, rubs, or gallops  ABDOMEN: Soft, Nontender, Nondistended; Bowel sounds present, body wall and flank edema  EXTREMITIES:  Edema  LYMPH: No lymphadenopathy noted  SKIN: No rashes or lesions, pale      LABS:                        12.5   5.87  )-----------( 124      ( 14 Aug 2021 06:09 )             33.9     08-14    139  |  107  |  73<H>  ----------------------------<  221<H>  4.2   |  25  |  2.20<H>    Ca    8.5      14 Aug 2021 06:09    TPro  6.8  /  Alb  2.4<L>  /  TBili  0.8  /  DBili  x   /  AST  30  /  ALT  15  /  AlkPhos  46  -          ABG - ( 13 Aug 2021 19:45 )  pH, Arterial: 7.47  pH, Blood: x     /  pCO2: 30    /  pO2: 103   / HCO3: 22    / Base Excess: -1.9  /  SaO2: 99.3

## 2021-08-15 NOTE — CONSULT NOTE ADULT - ASSESSMENT
#COVID  Monitor clinically.  Monitor Oxygenation  O2 supplementation.  Would start Remdesivir and watch renal function closely - up to 5 days depending on clinical course.  Monitor CBC, CMP daily  Ferritin, CRP, and D dimer q 48 hrs.  IV Dexamethasone 6mg q24hrs x10 days if hypoxia.  Anticoagulation per protocol.  Treatment options are limited-this as well as limitations of data discussed with pt.  Monitor for any bacterial superinfection/complications.  This tx plan was formulated utilizing my clinical judgement, currently available local/regional anecdotal information, organizational treatment recommendations with COVID-19 specific considerations given rapidly changing information available.     #Uncontrolled DM with hyperglycemic   having very high glucose levels likely int eh setting of steroids  consider endocrine consult  Would put him on standing basal bolus insulin or at the very long acting insulin     Remdesivir has not been shown to impact mortality. Thus far it has been shown to accomplish is decrease the length of hospitalization in patients with severe disease. In patient with moderate disease data showed clinical improvement in patient treated with 5 days of remdesivir, but not those treated for 10 days.    Criteria for use include:  • SpO2 < 94% on room air, OR requiring supplemental oxygen, OR requiring invasive mechanical ventilation, OR requiring ECMO (e.g moderate to critical disease)  • eGFR > 30 mL/min or less then 30 when the benefits outweight the risks  • ALT < 5X ULN    Contraindications  • Use during pregnancy unless the potential benefits justify the potential risk for the mother and the fetus.  • Remdesivir should not be initiated in patients with ALT greater than or equal to 5 times the upper limit of normal (ULN) of baseline.  • Use in patients with renal impairment is based on potential risk/benefit considerations.  Remdesivir Dosing  • Adult patients greater than or equal to 40 k mG IV x 1 dose on day 1, followed by 100 mG IV q24h.  • Administration of Remdesivir in patients with eGFR less than 30 mL/min should be considered if the potential benefits outweigh the potential risks. There is a potential accumulation of cyclodextrin excipient found in Remdesivir.  Treatment Duration  • Mechanical ventilation and/or ECMO, duration is 10 days of treatment.  • Not requiring mechanical ventilation or ECMO, duration is 5 days of treatment.       If patient does not demonstrate clinical improvement, treatment may be extended for a total of 10 days if clinically indicated.  • Patients do not need to complete the course if they are stable for discharge.  Monitoring Parameters  • Daily renal and hepatic monitoring should be performed while on therapy       Discontinue therapy if patient is asymptomatic with ALT greater than or equal to 5 times the ULN, restart once ALT less than 5 times the ULN.       Discontinue therapy if ALT elevation is accompanied by signs or symptoms of liver inflammation or increasing conjugated bilirubin, alkaline phosphatase, or INR.  • Pregnancy Test  • Infusion-related reactions have been reported       Discontinue infusion and administer appropriate treatment. 61M with COVID and uncontrolled DM2   CXR (I personally reviewed) with left lung opacity     #COVID  Monitor clinically.  Monitor Oxygenation  O2 supplementation.  Would start Remdesivir and watch renal function closely - up to 5 days depending on clinical course.  Monitor CBC, CMP daily  Ferritin, CRP, and D dimer q 48 hrs.  IV Dexamethasone 6mg q24hrs x10 days if hypoxia.  Anticoagulation per protocol.  Treatment options are limited-this as well as limitations of data discussed with pt.  Monitor for any bacterial superinfection/complications.  This tx plan was formulated utilizing my clinical judgement, currently available local/regional anecdotal information, organizational treatment recommendations with COVID-19 specific considerations given rapidly changing information available.     #Uncontrolled DM with hyperglycemic   having very high glucose levels likely int eh setting of steroids  consider endocrine consult  Would put him on standing basal bolus insulin or at the very least  long acting insulin     Remdesivir has not been shown to impact mortality. Thus far it has been shown to accomplish is decrease the length of hospitalization in patients with severe disease. In patient with moderate disease data showed clinical improvement in patient treated with 5 days of remdesivir, but not those treated for 10 days.    Criteria for use include:  • SpO2 < 94% on room air, OR requiring supplemental oxygen, OR requiring invasive mechanical ventilation, OR requiring ECMO (e.g moderate to critical disease)  • eGFR > 30 mL/min or less then 30 when the benefits outweigh the risks  • ALT < 5X ULN    Contraindications  • Use during pregnancy unless the potential benefits justify the potential risk for the mother and the fetus.  • Remdesivir should not be initiated in patients with ALT greater than or equal to 5 times the upper limit of normal (ULN) of baseline.  • Use in patients with renal impairment is based on potential risk/benefit considerations.  Remdesivir Dosing  • Adult patients greater than or equal to 40 k mG IV x 1 dose on day 1, followed by 100 mG IV q24h.  • Administration of Remdesivir in patients with eGFR less than 30 mL/min should be considered if the potential benefits outweigh the potential risks. There is a theoretical riskof accumulation of cyclodextrin excipient found in Remdesivir.  Treatment Duration  • Not requiring mechanical ventilation or ECMO, duration is 5 days of treatment.       If patient does not demonstrate clinical improvement, treatment may be extended for a total of 10 days if clinically indicated.  • Patients do not need to complete the course if they are stable for discharge.  Monitoring Parameters  • Daily renal and hepatic monitoring should be performed while on therapy       Discontinue therapy if patient is asymptomatic with ALT greater than or equal to 5 times the ULN, restart once ALT less than 5 times the ULN.       Discontinue therapy if ALT elevation is accompanied by signs or symptoms of liver inflammation or increasing conjugated bilirubin, alkaline phosphatase, or INR.  • Pregnancy Test  • Infusion-related reactions have been reported       Discontinue infusion and administer appropriate treatment.    Discussed with Dr. West Lara DO  Infectious Disease Attending  Pager [phone]  After 5pm/weekends please call [phone] for all inquiries and new consults

## 2021-08-15 NOTE — CONSULT NOTE ADULT - SUBJECTIVE AND OBJECTIVE BOX
MARIA LUZ ROCK  [id number]        Patient is a 61y old  Male who presents with a chief complaint of acute hypoxic respiratory failure (15 Aug 2021 08:59)      HPI:  60 yo Male patient with PMHx of HTN, DM type 2 (insulin dependent) presents today complaining SOB and generalized weakness since 2021. Pt endorses that came back from [place] on  and tested positive for COVID-19, states he was in contact with a friend that tested positive. Since 2021 has been progressively worsening  generalized weakness and exertional dyspnea, associated with cough, decreased appetite with poor oral intake, decreases smell and one episode of vomiting today. Patient before was able to walk long distances, climb stairs w/o dyspna  At this time he denies  fever, chills, chest pain, palpitations, abdominal pain, diarrhea, constipation, urinary frequency, urgency, hematuria, hematochezia, melena or dysuria, changes in vision, dizziness, numbness, tingling. He is not vaccinated against covid19    ED course: VS  Afebrile, HR: 68, BP: 113/59, SpO2: 94%-> 98% 3 NC RR: 19  Labs significant for Neutrophils 84% D-dimer 323 Procalcitonin 0.41, Na 132 BUN/creatinine: 84/2.80, Glucose 427 GFR: 27, lactate 2.3. AB.4/30/103/22/30.0  Patient received 1 Lt NS bolus in the ED  (13 Aug 2021 19:56)      ID consulted for workup and antibiotic management     PAST MEDICAL & SURGICAL HISTORY:  Hypertension    Diabetes mellitus    No significant past surgical history        Allergies  calcium channel blockers (Other)        ANTIMICROBIALS:      MEDICATIONS  (STANDING):        OTHER MEDS: MEDICATIONS  (STANDING):  acetaminophen   Tablet .. 650 every 6 hours PRN  dexAMETHasone     Tablet 6 daily  dextrose 40% Gel 15 once  dextrose 50% Injectable 25 once  dextrose 50% Injectable 12.5 once  dextrose 50% Injectable 25 once  glucagon  Injectable 1 once  heparin   Injectable 5000 every 8 hours  insulin lispro (ADMELOG) corrective regimen sliding scale  three times a day before meals  ondansetron Injectable 4 every 8 hours PRN      SOCIAL HISTORY:     Tobacco: denies   EtOH: denies   Recreational drug use: denies   Lives with: wife, son   Ambulates: independent   ADLs: independent   Vaccinations: Not COVID-19 vaccinated     FAMILY HISTORY:  FH: HTN (hypertension) (Father, Mother)        REVIEW OF SYSTEMS  [  ] ROS unobtainable because:    [ X ] All other systems negative except as noted below:	    Constitutional:  [ ] fever [ ] chills  [ ] weight loss  [X ] weakness  Skin:  [ ] rash [ ] phlebitis	  Eyes: [ ] icterus [ ] pain  [ ] discharge	  ENMT: [X ] sore throat  [ ] thrush [ ] ulcers [ ] exudates  Respiratory: [X ] dyspnea [ ] hemoptysis [X ] cough [ ] sputum	  Cardiovascular:  [ ] chest pain [ ] palpitations [ ] edema	  Gastrointestinal:  [ ] nausea [ ] vomiting [ ] diarrhea [ ] constipation [ ] pain	  Genitourinary:  [ ] dysuria [ ] frequency [ ] hematuria [ ] discharge [ ] flank pain  [ ] incontinence  Musculoskeletal:  [ ] myalgias [ ] arthralgias [ ] arthritis  [ ] back pain  Neurological:  [ ] headache [ ] seizures  [ ] confusion/altered mental status  Psychiatric:  [ ] anxiety [ ] depression	  Hematology/Lymphatics:  [ ] lymphadenopathy  Endocrine:  [ ] adrenal [ ] thyroid  Allergic/Immunologic:	 [ ] transplant [ ] seasonal    Vital Signs Last 24 Hrs  T(F): 98.2 (08-15-21 @ 05:20), Max: 100.8 (21 @ 03:39)    Vital Signs Last 24 Hrs  HR: 84 (08-15-21 @ 05:20) (55 - 84)  BP: 108/69 (08-15-21 @ 05:20) (100/56 - 108/69)  RR: 18 (08-15-21 @ 05:20)  SpO2: 92% (08-15-21 @ 05:20) (92% - 100%)  Wt(kg): --    PHYSICAL EXAM:  Constitutional: non-toxic, no distress, nasal cannula   HEAD/EYES: anicteric, no conjunctival injection  ENT:  supple, no thrush  Cardiovascular:   normal S1, S2, no murmur, no edema  Respiratory:  clear BS bilaterally, no wheezes, no rales  GI:  soft, non-tender, normal bowel sounds  :  no mark, no CVA tenderness  Musculoskeletal:  no synovitis, normal ROM  Neurologic: awake and alert, normal strength, no focal findings  Skin:  no rash, no erythema, no phlebitis  Heme/Onc: no lymphadenopathy   Psychiatric:  awake, alert, appropriate mood          WBC Count: 5.86 K/uL (08-15 @ 09:11)  WBC Count: 5.87 K/uL ( @ 06:09)  WBC Count: 5.31 K/uL ( @ 18:13)                            13.0   5.86  )-----------( 145      ( 15 Aug 2021 09:11 )             36.8           139  |  107  |  73<H>  ----------------------------<  221<H>  4.2   |  25  |  2.20<H>    Ca    8.5      14 Aug 2021 06:09    TPro  6.8  /  Alb  2.4<L>  /  TBili  0.8  /  DBili  x   /  AST  30  /  ALT  15  /  AlkPhos  46        Creatinine Trend: 2.20<--, 2.80<--        MICROBIOLOGY:    Culture - Blood (collected 21 @ 01:02)  Source: .Blood Blood  Preliminary Report (08-15-21 @ 02:01):    No growth to date.    Culture - Blood (collected 21 @ 01:02)  Source: .Blood Blood  Preliminary Report (08-15-21 @ 02:01):    No growth to date.      COVID-19 PCR: Detected ()    C-Reactive Protein, Serum: 72 ()    Ferritin, Serum: 1084 ()      D-Dimer Assay, Quantitative: 323 ()    Procalcitonin, Serum: 0.41 (21 @ 18:32)      RADIOLOGY:           MARIA LUZ ROCK  [id number]        Patient is a 61y old  Male who presents with a chief complaint of acute hypoxic respiratory failure (15 Aug 2021 08:59)      HPI:  62 yo Male patient with PMHx of HTN, DM type 2 (insulin dependent) presents today complaining SOB and generalized weakness since 2021. Pt endorses that came back from [place] on  and tested positive for COVID-19, states he was in contact with a friend that tested positive. Since 2021 has been progressively worsening  generalized weakness and exertional dyspnea, associated with cough, decreased appetite with poor oral intake, decreases smell and one episode of vomiting today. Patient before was able to walk long distances, climb stairs w/o dyspna  At this time he denies  fever, chills, chest pain, palpitations, abdominal pain, diarrhea, constipation, urinary frequency, urgency, hematuria, hematochezia, melena or dysuria, changes in vision, dizziness, numbness, tingling. He is not vaccinated against covid19    ED course: VS  Afebrile, HR: 68, BP: 113/59, SpO2: 94%-> 98% 3 NC RR: 19  Labs significant for Neutrophils 84% D-dimer 323 Procalcitonin 0.41, Na 132 BUN/creatinine: 84/2.80, Glucose 427 GFR: 27, lactate 2.3. AB.4/30/103/22/30.0  Patient received 1 Lt NS bolus in the ED  (13 Aug 2021 19:56)      ID consulted for workup and antibiotic management     PAST MEDICAL & SURGICAL HISTORY:  Hypertension    Diabetes mellitus    No significant past surgical history        Allergies  calcium channel blockers (Other)        ANTIMICROBIALS:      MEDICATIONS  (STANDING):        OTHER MEDS: MEDICATIONS  (STANDING):  acetaminophen   Tablet .. 650 every 6 hours PRN  dexAMETHasone     Tablet 6 daily  dextrose 40% Gel 15 once  dextrose 50% Injectable 25 once  dextrose 50% Injectable 12.5 once  dextrose 50% Injectable 25 once  glucagon  Injectable 1 once  heparin   Injectable 5000 every 8 hours  insulin lispro (ADMELOG) corrective regimen sliding scale  three times a day before meals  ondansetron Injectable 4 every 8 hours PRN      SOCIAL HISTORY:     Tobacco: denies   EtOH: denies   Recreational drug use: denies   Lives with: wife, son   Ambulates: independent   ADLs: independent   Vaccinations: Not COVID-19 vaccinated     FAMILY HISTORY:  FH: HTN (hypertension) (Father, Mother)        REVIEW OF SYSTEMS  [  ] ROS unobtainable because:    [ X ] All other systems negative except as noted below:	    Constitutional:  [ ] fever [ ] chills  [ ] weight loss  [X ] weakness  Skin:  [ ] rash [ ] phlebitis	  Eyes: [ ] icterus [ ] pain  [ ] discharge	  ENMT: [X ] sore throat  [ ] thrush [ ] ulcers [ ] exudates  Respiratory: [X ] dyspnea [ ] hemoptysis [X ] cough [ ] sputum	  Cardiovascular:  [ ] chest pain [ ] palpitations [ ] edema	  Gastrointestinal:  [ ] nausea [ ] vomiting [ ] diarrhea [ ] constipation [ ] pain	  Genitourinary:  [ ] dysuria [ ] frequency [ ] hematuria [ ] discharge [ ] flank pain  [ ] incontinence  Musculoskeletal:  [ ] myalgias [ ] arthralgias [ ] arthritis  [ ] back pain  Neurological:  [ ] headache [ ] seizures  [ ] confusion/altered mental status  Psychiatric:  [ ] anxiety [ ] depression	  Hematology/Lymphatics:  [ ] lymphadenopathy  Endocrine:  [ ] adrenal [ ] thyroid  Allergic/Immunologic:	 [ ] transplant [ ] seasonal    Vital Signs Last 24 Hrs  T(F): 98.2 (08-15-21 @ 05:20), Max: 100.8 (21 @ 03:39)    Vital Signs Last 24 Hrs  HR: 84 (08-15-21 @ 05:20) (55 - 84)  BP: 108/69 (08-15-21 @ 05:20) (100/56 - 108/69)  RR: 18 (08-15-21 @ 05:20)  SpO2: 92% (08-15-21 @ 05:20) (92% - 100%)  Wt(kg): --    PHYSICAL EXAM:  Constitutional: non-toxic, no distress, nasal cannula   HEAD/EYES: anicteric, no conjunctival injection  ENT:  supple, no thrush  Cardiovascular:   normal S1, S2, no murmur, no edema  Respiratory:  diminished BS bilaterally, no wheezes, no rales  GI:  soft, non-tender, normal bowel sounds  :  no mark, no CVA tenderness  Musculoskeletal:  no synovitis, normal ROM  Neurologic: awake and alert, normal strength, no focal findings  Skin:  no rash, no erythema, no phlebitis  Heme/Onc: no lymphadenopathy   Psychiatric:  awake, alert, appropriate mood          WBC Count: 5.86 K/uL (08-15 @ 09:11)  WBC Count: 5.87 K/uL ( @ 06:09)  WBC Count: 5.31 K/uL ( @ 18:13)                            13.0   5.86  )-----------( 145      ( 15 Aug 2021 09:11 )             36.8           139  |  107  |  73<H>  ----------------------------<  221<H>  4.2   |  25  |  2.20<H>    Ca    8.5      14 Aug 2021 06:09    TPro  6.8  /  Alb  2.4<L>  /  TBili  0.8  /  DBili  x   /  AST  30  /  ALT  15  /  AlkPhos  46        Creatinine Trend: 2.20<--, 2.80<--        MICROBIOLOGY:    Culture - Blood (collected 21 @ 01:02)  Source: .Blood Blood  Preliminary Report (08-15-21 @ 02:01):    No growth to date.    Culture - Blood (collected 21 @ 01:02)  Source: .Blood Blood  Preliminary Report (08-15-21 @ 02:01):    No growth to date.      COVID-19 PCR: Detected ()    C-Reactive Protein, Serum: 72 ()    Ferritin, Serum: 1084 ()      D-Dimer Assay, Quantitative: 323 ()    Procalcitonin, Serum: 0.41 (21 @ 18:32)      RADIOLOGY:  < from: Xray Chest 1 View-PORTABLE IMMEDIATE (21 @ 18:28) >  IMPRESSION: Patchy left basilar retrocardiac atelectasis/infiltrate.      (I personally reviewed)

## 2021-08-15 NOTE — PROVIDER CONTACT NOTE (OTHER) - SITUATION
Notified by CNA that patient bg ws 561, on repeat test at 2205, bg was 399 and 441. Dr. Painting notified, instructed to repeat sugar in on hour. Repeat sugar done at 2338 was 457 and 418.

## 2021-08-15 NOTE — PROVIDER CONTACT NOTE (OTHER) - BACKGROUND
Dr. Painting notified, 10 units of regular insulin ordered. Instructed to repeat sugar in one hour. Repeat sugar @ 0112 was 436 and 409.

## 2021-08-16 LAB
ALBUMIN SERPL ELPH-MCNC: 2.4 G/DL — LOW (ref 3.3–5)
ALBUMIN SERPL ELPH-MCNC: 2.4 G/DL — LOW (ref 3.3–5)
ALP SERPL-CCNC: 49 U/L — SIGNIFICANT CHANGE UP (ref 40–120)
ALP SERPL-CCNC: 51 U/L — SIGNIFICANT CHANGE UP (ref 40–120)
ALT FLD-CCNC: 17 U/L — SIGNIFICANT CHANGE UP (ref 12–78)
ALT FLD-CCNC: 21 U/L — SIGNIFICANT CHANGE UP (ref 12–78)
ANION GAP SERPL CALC-SCNC: 9 MMOL/L — SIGNIFICANT CHANGE UP (ref 5–17)
AST SERPL-CCNC: 21 U/L — SIGNIFICANT CHANGE UP (ref 15–37)
AST SERPL-CCNC: 21 U/L — SIGNIFICANT CHANGE UP (ref 15–37)
BILIRUB DIRECT SERPL-MCNC: 0.2 MG/DL — SIGNIFICANT CHANGE UP (ref 0.05–0.2)
BILIRUB INDIRECT FLD-MCNC: 0.7 MG/DL — SIGNIFICANT CHANGE UP (ref 0.2–1)
BILIRUB SERPL-MCNC: 0.9 MG/DL — SIGNIFICANT CHANGE UP (ref 0.2–1.2)
BILIRUB SERPL-MCNC: 0.9 MG/DL — SIGNIFICANT CHANGE UP (ref 0.2–1.2)
BUN SERPL-MCNC: 70 MG/DL — HIGH (ref 7–23)
CALCIUM SERPL-MCNC: 8.9 MG/DL — SIGNIFICANT CHANGE UP (ref 8.5–10.1)
CHLORIDE SERPL-SCNC: 102 MMOL/L — SIGNIFICANT CHANGE UP (ref 96–108)
CO2 SERPL-SCNC: 25 MMOL/L — SIGNIFICANT CHANGE UP (ref 22–31)
CREAT SERPL-MCNC: 1.9 MG/DL — HIGH (ref 0.5–1.3)
GLUCOSE SERPL-MCNC: 322 MG/DL — HIGH (ref 70–99)
HCT VFR BLD CALC: 35.8 % — LOW (ref 39–50)
HGB BLD-MCNC: 13 G/DL — SIGNIFICANT CHANGE UP (ref 13–17)
INR BLD: 1.04 RATIO — SIGNIFICANT CHANGE UP (ref 0.88–1.16)
MAGNESIUM SERPL-MCNC: 3.3 MG/DL — HIGH (ref 1.6–2.6)
MCHC RBC-ENTMCNC: 30 PG — SIGNIFICANT CHANGE UP (ref 27–34)
MCHC RBC-ENTMCNC: 36.3 GM/DL — HIGH (ref 32–36)
MCV RBC AUTO: 82.5 FL — SIGNIFICANT CHANGE UP (ref 80–100)
NRBC # BLD: 0 /100 WBCS — SIGNIFICANT CHANGE UP (ref 0–0)
PHOSPHATE SERPL-MCNC: 3.9 MG/DL — SIGNIFICANT CHANGE UP (ref 2.5–4.5)
PLATELET # BLD AUTO: 191 K/UL — SIGNIFICANT CHANGE UP (ref 150–400)
POTASSIUM SERPL-MCNC: 4.9 MMOL/L — SIGNIFICANT CHANGE UP (ref 3.5–5.3)
POTASSIUM SERPL-SCNC: 4.9 MMOL/L — SIGNIFICANT CHANGE UP (ref 3.5–5.3)
PROT SERPL-MCNC: 6.9 G/DL — SIGNIFICANT CHANGE UP (ref 6–8.3)
PROT SERPL-MCNC: 7.1 G/DL — SIGNIFICANT CHANGE UP (ref 6–8.3)
PROTHROM AB SERPL-ACNC: 12.2 SEC — SIGNIFICANT CHANGE UP (ref 10.6–13.6)
RBC # BLD: 4.34 M/UL — SIGNIFICANT CHANGE UP (ref 4.2–5.8)
RBC # FLD: 13.4 % — SIGNIFICANT CHANGE UP (ref 10.3–14.5)
SODIUM SERPL-SCNC: 136 MMOL/L — SIGNIFICANT CHANGE UP (ref 135–145)
WBC # BLD: 7.54 K/UL — SIGNIFICANT CHANGE UP (ref 3.8–10.5)
WBC # FLD AUTO: 7.54 K/UL — SIGNIFICANT CHANGE UP (ref 3.8–10.5)

## 2021-08-16 PROCEDURE — 99232 SBSQ HOSP IP/OBS MODERATE 35: CPT

## 2021-08-16 PROCEDURE — 99233 SBSQ HOSP IP/OBS HIGH 50: CPT

## 2021-08-16 RX ORDER — REMDESIVIR 5 MG/ML
100 INJECTION INTRAVENOUS EVERY 24 HOURS
Refills: 0 | Status: COMPLETED | OUTPATIENT
Start: 2021-08-17 | End: 2021-08-19

## 2021-08-16 RX ORDER — SODIUM CHLORIDE 9 MG/ML
500 INJECTION INTRAMUSCULAR; INTRAVENOUS; SUBCUTANEOUS ONCE
Refills: 0 | Status: COMPLETED | OUTPATIENT
Start: 2021-08-16 | End: 2021-08-17

## 2021-08-16 RX ORDER — INSULIN LISPRO 100/ML
12 VIAL (ML) SUBCUTANEOUS
Refills: 0 | Status: DISCONTINUED | OUTPATIENT
Start: 2021-08-16 | End: 2021-08-19

## 2021-08-16 RX ADMIN — INSULIN GLARGINE 25 UNIT(S): 100 INJECTION, SOLUTION SUBCUTANEOUS at 08:42

## 2021-08-16 RX ADMIN — Medication 6: at 17:42

## 2021-08-16 RX ADMIN — Medication 6 MILLIGRAM(S): at 06:28

## 2021-08-16 RX ADMIN — INSULIN GLARGINE 25 UNIT(S): 100 INJECTION, SOLUTION SUBCUTANEOUS at 00:18

## 2021-08-16 RX ADMIN — HEPARIN SODIUM 5000 UNIT(S): 5000 INJECTION INTRAVENOUS; SUBCUTANEOUS at 14:39

## 2021-08-16 RX ADMIN — Medication 12 UNIT(S): at 17:43

## 2021-08-16 RX ADMIN — REMDESIVIR 500 MILLIGRAM(S): 5 INJECTION INTRAVENOUS at 00:17

## 2021-08-16 RX ADMIN — Medication 12 UNIT(S): at 12:26

## 2021-08-16 RX ADMIN — Medication 8: at 12:26

## 2021-08-16 RX ADMIN — Medication 8: at 08:47

## 2021-08-16 RX ADMIN — HEPARIN SODIUM 5000 UNIT(S): 5000 INJECTION INTRAVENOUS; SUBCUTANEOUS at 06:28

## 2021-08-16 RX ADMIN — HEPARIN SODIUM 5000 UNIT(S): 5000 INJECTION INTRAVENOUS; SUBCUTANEOUS at 00:16

## 2021-08-16 NOTE — CONSULT NOTE ADULT - SUBJECTIVE AND OBJECTIVE BOX
Patient is a 61y old  Male who presents with a chief complaint of acute hypoxic respiratory failure (15 Aug 2021 17:38)      Reason For Consult: dm2 uncontrolled    HPI:  60 yo Male patient with PMHx of HTN, DM type 2 (insulin dependent) presents today complaining SOB and generalized weakness since 2021. Pt endorses that came back from [place] on  and tested positive for COVID-19, states he was in contact with a friend that tested positive. Since 2021 has been progressively worsening  generalized weakness and exertional dyspnea, associated with cough, decreased appetite with poor oral intake, decreases smell and one episode of vomiting today. Patient before was able to walk long distances, climb stairs w/o dyspna  At this time he denies  fever, chills, chest pain, palpitations, abdominal pain, diarrhea, constipation, urinary frequency, urgency, hematuria, hematochezia, melena or dysuria, changes in vision, dizziness, numbness, tingling. He is not vaccinated against covid19    ED course: VS  Afebrile, HR: 68, BP: 113/59, SpO2: 94%-> 98% 3 NC RR: 19  Labs significant for Neutrophils 84% D-dimer 323 Procalcitonin 0.41, Na 132 BUN/creatinine: 84/2.80, Glucose 427 GFR: 27, lactate 2.3. AB.4/30/103/22/30.0  Patient received 1 Lt NS bolus in the ED  (13 Aug 2021 19:56)      PAST MEDICAL & SURGICAL HISTORY:  Hypertension    Diabetes mellitus    No significant past surgical history        FAMILY HISTORY:  FH: HTN (hypertension) (Father, Mother)          Social History:    MEDICATIONS  (STANDING):  dexAMETHasone     Tablet 6 milliGRAM(s) Oral daily  dextrose 40% Gel 15 Gram(s) Oral once  dextrose 5%. 1000 milliLiter(s) (50 mL/Hr) IV Continuous <Continuous>  dextrose 5%. 1000 milliLiter(s) (100 mL/Hr) IV Continuous <Continuous>  dextrose 50% Injectable 25 Gram(s) IV Push once  dextrose 50% Injectable 12.5 Gram(s) IV Push once  dextrose 50% Injectable 25 Gram(s) IV Push once  glucagon  Injectable 1 milliGRAM(s) IntraMuscular once  heparin   Injectable 5000 Unit(s) SubCutaneous every 8 hours  insulin glargine Injectable (LANTUS) 25 Unit(s) SubCutaneous every morning  insulin glargine Injectable (LANTUS) 25 Unit(s) SubCutaneous at bedtime  insulin lispro (ADMELOG) corrective regimen sliding scale   SubCutaneous three times a day before meals  insulin lispro Injectable (ADMELOG) 6 Unit(s) SubCutaneous three times a day before meals  remdesivir  IVPB   IV Intermittent     MEDICATIONS  (PRN):  acetaminophen   Tablet .. 650 milliGRAM(s) Oral every 6 hours PRN Temp greater or equal to 38.5C (101.3F), Mild Pain (1 - 3)  ondansetron Injectable 4 milliGRAM(s) IV Push every 8 hours PRN Nausea and/or Vomiting        T(C): 36.4 (21 @ 05:30), Max: 36.6 (08-15-21 @ 12:39)  HR: 52 (21 @ 05:30) (52 - 58)  BP: 109/69 (21 @ 05:30) (93/61 - 109/69)  RR: 18 (21 @ 05:30) (18 - 18)  SpO2: 94% (21 @ 05:30) (91% - 95%)  Wt(kg): --    PHYSICAL EXAM:      CAPILLARY BLOOD GLUCOSE      POCT Blood Glucose.: 295 mg/dL (16 Aug 2021 00:13)  POCT Blood Glucose.: 283 mg/dL (15 Aug 2021 22:18)  POCT Blood Glucose.: 377 mg/dL (15 Aug 2021 17:03)  POCT Blood Glucose.: 475 mg/dL (15 Aug 2021 11:53)  POCT Blood Glucose.: 339 mg/dL (15 Aug 2021 08:22)                            13.1   6.91  )-----------( 179      ( 15 Aug 2021 10:21 )             36.6       CMP:  15 @ 18:40  SGPT 20  Albumin 2.5   Alk Phos 51   Anion Gap --   SGOT 25   Total Bili 1.0   BUN --   Calcium Total --   CO2 --   Chloride --   Creatinine 2.00   eGFR if AA 41   eGFR if non AA 35   Glucose --   Potassium --   Protein 7.4   Sodium --      Thyroid Function Tests:      Diabetes Tests:       Radiology:

## 2021-08-16 NOTE — PROGRESS NOTE ADULT - SUBJECTIVE AND OBJECTIVE BOX
[facility]  INFECTIOUS DISEASES   [address] [place] [address]  Tel: [phone]     Fax: [phone]  =======================================================    MARIA LUZ ROCK [id number]    Follow up: COVID19    No complaint, minimal cough, no SOB, on 2L O2 with NC.   No fever. No GI symptoms.     Allergies:  calcium channel blockers (Other)    Antibiotics:  dexAMETHasone     Tablet 6 milliGRAM(s) Oral daily  remdesivir  IVPB   IV Intermittent        REVIEW OF SYSTEMS:  CONSTITUTIONAL:  No Fever or chills  HEENT:   No diplopia or blurred vision.  No earache, sore throat or runny nose.  CARDIOVASCULAR:  No chest pain or SOB  RESPIRATORY:  No cough, shortness of breath, PND or orthopnea.  GASTROINTESTINAL:  No nausea, vomiting or diarrhea.  GENITOURINARY:  No dysuria, frequency or urgency. No Blood in urine  MUSCULOSKELETAL:  no joint aches, no muscle pain  SKIN:  No change in skin, hair or nails.  NEUROLOGIC:  No paresthesias or weakness.  PSYCHIATRIC:  No disorder of thought or mood.  ENDOCRINE:  No heat or cold intolerance, polyuria or polydipsia.  HEMATOLOGICAL:  No easy bruising or bleeding.      Physical Exam:  ICU Vital Signs Last 24 Hrs  T(C): 36.4 (16 Aug 2021 05:30), Max: 36.6 (15 Aug 2021 12:39)  T(F): 97.6 (16 Aug 2021 05:30), Max: 97.8 (15 Aug 2021 12:39)  HR: 52 (16 Aug 2021 05:30) (52 - 58)  BP: 109/69 (16 Aug 2021 05:30) (93/61 - 109/69)  RR: 18 (16 Aug 2021 05:30) (18 - 18)  SpO2: 94% (16 Aug 2021 05:30) (91% - 95%)  GEN: NAD  HEENT: normocephalic and atraumatic. EOMI. RUKHSANA.    NECK: Supple.  No lymphadenopathy   LUNGS: Clear to auscultation.  HEART: Regular rate and rhythm without murmur.  ABDOMEN: Soft, nontender, and nondistended.  Positive bowel sounds.    : No CVA tenderness  EXTREMITIES: Without any cyanosis, clubbing, rash, lesions or edema.  MSK: no joint swelling  NEUROLOGIC: grossly intact.  PSYCHIATRIC: Appropriate affect .  SKIN: No ulceration or induration present.      Labs:      136  |  102  |  70<H>  ----------------------------<  322<H>  4.9   |  25  |  1.90<H>    Ca    8.9      16 Aug 2021 09:16  Phos  3.9       Mg     3.3         TPro  7.1  /  Alb  2.4<L>  /  TBili  0.9  /  DBili  .20  /  AST  21  /  ALT  21  /  AlkPhos  49                          13.0   7.54  )-----------( 191      ( 16 Aug 2021 09:16 )             35.8     PT/INR - ( 16 Aug 2021 09:16 )   PT: 12.2 sec;   INR: 1.04 ratio      LIVER FUNCTIONS - ( 16 Aug 2021 09:16 )  Alb: 2.4 g/dL / Pro: 7.1 g/dL / ALK PHOS: 49 U/L / ALT: 21 U/L / AST: 21 U/L / GGT: x           RECENT CULTURES:   @ 01:02 .Blood Blood     No growth to date.    All imaging and data are reviewed.   < from: Xray Chest 1 View-PORTABLE IMMEDIATE (21 @ 18:28) >  EXAM:  XR CHEST PORTABLE IMMED 1V                        PROCEDURE DATE:  2021    INTERPRETATION:  Short of breath, Covid positive  AP chest. Prior 5/15/2017.  Heart magnified by AP film shallow inspiration. Patchy left basilar retrocardiac atelectasis/infiltrate. Correlate for infection. No pleural effusion. Calcific bursitis left shoulder.  IMPRESSION: Patchy left basilar retrocardiac atelectasis/infiltrate.    Assessment and Plan:   61M with COVID and uncontrolled DM2   CXR (I personally reviewed) with left lung opacity     Remdesivir has not been shown to impact mortality. Thus far it has been shown to accomplish is decrease the length of hospitalization in patients with severe disease. In patient with moderate disease data showed clinical improvement in patient treated with 5 days of remdesivir, but not those treated for 10 days.    Criteria for use include:  • SpO2 < 94% on room air, OR requiring supplemental oxygen, OR requiring invasive mechanical ventilation, OR requiring ECMO (e.g moderate to critical disease)  • eGFR > 30 mL/min or less then 30 when the benefits outweigh the risks  • ALT < 5X ULN    Contraindications  • Use during pregnancy unless the potential benefits justify the potential risk for the mother and the fetus.  • Remdesivir should not be initiated in patients with ALT greater than or equal to 5 times the upper limit of normal (ULN) of baseline.  • Use in patients with renal impairment is based on potential risk/benefit considerations.  Remdesivir Dosing  • Adult patients greater than or equal to 40 k mG IV x 1 dose on day 1, followed by 100 mG IV q24h.  • Administration of Remdesivir in patients with eGFR less than 30 mL/min should be considered if the potential benefits outweigh the potential risks. There is a theoretical riskof accumulation of cyclodextrin excipient found in Remdesivir.  Treatment Duration  • Not requiring mechanical ventilation or ECMO, duration is 5 days of treatment.       If patient does not demonstrate clinical improvement, treatment may be extended for a total of 10 days if clinically indicated.  • Patients do not need to complete the course if they are stable for discharge.  Monitoring Parameters  • Daily renal and hepatic monitoring should be performed while on therapy       Discontinue therapy if patient is asymptomatic with ALT greater than or equal to 5 times the ULN, restart once ALT less than 5 times the ULN.       Discontinue therapy if ALT elevation is accompanied by signs or symptoms of liver inflammation or increasing conjugated bilirubin, alkaline phosphatase, or INR.  • Pregnancy Test  • Infusion-related reactions have been reported       Discontinue infusion and administer appropriate treatment.    Recommendation:  #COVID  Monitor clinically.  Monitor Oxygenation  O2 supplementation.  Would start Remdesivir and watch renal function closely - up to 5 days depending on clinical course.  Monitor CBC, CMP daily  Ferritin, CRP, and D dimer q 48 hrs.  IV Dexamethasone 6mg q24hrs x10 days if hypoxia.  Anticoagulation per protocol.  Treatment options are limited-this as well as limitations of data discussed with pt.  Monitor for any bacterial superinfection/complications.  This tx plan was formulated utilizing my clinical judgement, currently available local/regional anecdotal information, organizational treatment recommendations with COVID-19 specific considerations given rapidly changing information available.     #Uncontrolled DM with hyperglycemic   having very high glucose levels likely in the setting of steroids  consider endocrine consult  Would put him on standing basal bolus insulin or at the very least  long acting insulin     Will follow.     Macy Aguayo MD  Division of infectious Diseases  Cell [phone] between 8am and 6pm  After 6pm and over the weekends please call ID service line at [phone].

## 2021-08-16 NOTE — PROGRESS NOTE ADULT - ASSESSMENT
SORAIDA/CKD  COVID19 PNA  Acute respiratory failure  DM  HTN      -Cr 1.35 on 2017 suggest CKDIII baseline. Cr improving, continue to monitor with daily labs.   -SORAIDA clinically pre-renal. Urine studies suggest pre-renal etiology. However, cannot r/o COVID direct renal injury though less likely.  -Avoid excessive IVF with COVID pulmonary status  -Renal US if Cr begins to worsen  -No indication for RRT at this time

## 2021-08-16 NOTE — PROGRESS NOTE ADULT - SUBJECTIVE AND OBJECTIVE BOX
Patient is a 61y old  Male who presents with a chief complaint of acute hypoxic respiratory failure (16 Aug 2021 16:49)      INTERVAL HPI/OVERNIGHT EVENTS: Patient seen and examined at bedside. No overnight events occurred. Patient has no complaints at this time.     MEDICATIONS  (STANDING):  dexAMETHasone     Tablet 6 milliGRAM(s) Oral daily  dextrose 40% Gel 15 Gram(s) Oral once  dextrose 5%. 1000 milliLiter(s) (50 mL/Hr) IV Continuous <Continuous>  dextrose 5%. 1000 milliLiter(s) (100 mL/Hr) IV Continuous <Continuous>  dextrose 50% Injectable 25 Gram(s) IV Push once  dextrose 50% Injectable 12.5 Gram(s) IV Push once  dextrose 50% Injectable 25 Gram(s) IV Push once  glucagon  Injectable 1 milliGRAM(s) IntraMuscular once  heparin   Injectable 5000 Unit(s) SubCutaneous every 8 hours  insulin glargine Injectable (LANTUS) 25 Unit(s) SubCutaneous every morning  insulin glargine Injectable (LANTUS) 25 Unit(s) SubCutaneous at bedtime  insulin lispro (ADMELOG) corrective regimen sliding scale   SubCutaneous three times a day before meals  insulin lispro Injectable (ADMELOG) 12 Unit(s) SubCutaneous three times a day before meals  remdesivir  IVPB   IV Intermittent   sodium chloride 0.9% Bolus 500 milliLiter(s) IV Bolus once    MEDICATIONS  (PRN):  acetaminophen   Tablet .. 650 milliGRAM(s) Oral every 6 hours PRN Temp greater or equal to 38.5C (101.3F), Mild Pain (1 - 3)  ondansetron Injectable 4 milliGRAM(s) IV Push every 8 hours PRN Nausea and/or Vomiting      Allergies    calcium channel blockers (Other)    Intolerances        REVIEW OF SYSTEMS:  CONSTITUTIONAL: No fever or chills  HEENT:  No headache, no sore throat  RESPIRATORY: No cough, wheezing, or shortness of breath  CARDIOVASCULAR: No chest pain, palpitations  GASTROINTESTINAL: No abd pain, nausea, vomiting, or diarrhea  GENITOURINARY: No dysuria, frequency, or hematuria  NEUROLOGICAL: no focal weakness or dizziness  MUSCULOSKELETAL: no myalgias     Vital Signs Last 24 Hrs  T(C): 36.4 (16 Aug 2021 13:00), Max: 36.4 (16 Aug 2021 05:30)  T(F): 97.6 (16 Aug 2021 13:00), Max: 97.6 (16 Aug 2021 05:30)  HR: 58 (16 Aug 2021 13:00) (52 - 58)  BP: 94/59 (16 Aug 2021 13:00) (93/61 - 109/69)  BP(mean): --  RR: 18 (16 Aug 2021 13:00) (18 - 18)  SpO2: 93% (16 Aug 2021 13:00) (91% - 94%)    PHYSICAL EXAM:  GENERAL: NAD  HEENT:  anicteric, dry mucous membranes  CHEST/LUNG:  no rales, wheezes, or rhonchi, on NC  HEART:  RRR, S1, S2  ABDOMEN:  BS+, soft, nontender, nondistended  EXTREMITIES: no edema, cyanosis, or calf tenderness  NERVOUS SYSTEM: answers questions and follows commands appropriately    LABS:                        13.0   7.54  )-----------( 191      ( 16 Aug 2021 09:16 )             35.8     CBC Full  -  ( 16 Aug 2021 09:16 )  WBC Count : 7.54 K/uL  Hemoglobin : 13.0 g/dL  Hematocrit : 35.8 %  Platelet Count - Automated : 191 K/uL  Mean Cell Volume : 82.5 fl  Mean Cell Hemoglobin : 30.0 pg  Mean Cell Hemoglobin Concentration : 36.3 gm/dL  Auto Neutrophil # : x  Auto Lymphocyte # : x  Auto Monocyte # : x  Auto Eosinophil # : x  Auto Basophil # : x  Auto Neutrophil % : x  Auto Lymphocyte % : x  Auto Monocyte % : x  Auto Eosinophil % : x  Auto Basophil % : x    16 Aug 2021 09:16    136    |  102    |  70     ----------------------------<  322    4.9     |  25     |  1.90     Ca    8.9        16 Aug 2021 09:16  Phos  3.9       16 Aug 2021 09:16  Mg     3.3       16 Aug 2021 09:16    TPro  7.1    /  Alb  2.4    /  TBili  0.9    /  DBili  .20    /  AST  21     /  ALT  21     /  AlkPhos  49     16 Aug 2021 09:16    PT/INR - ( 16 Aug 2021 09:16 )   PT: 12.2 sec;   INR: 1.04 ratio             CAPILLARY BLOOD GLUCOSE      POCT Blood Glucose.: 281 mg/dL (16 Aug 2021 17:07)  POCT Blood Glucose.: 317 mg/dL (16 Aug 2021 12:11)  POCT Blood Glucose.: 329 mg/dL (16 Aug 2021 08:26)  POCT Blood Glucose.: 295 mg/dL (16 Aug 2021 00:13)  POCT Blood Glucose.: 283 mg/dL (15 Aug 2021 22:18)        Culture - Blood (collected 08-14-21 @ 01:02)  Source: .Blood Blood  Preliminary Report (08-15-21 @ 02:01):    No growth to date.    Culture - Blood (collected 08-14-21 @ 01:02)  Source: .Blood Blood  Preliminary Report (08-15-21 @ 02:01):    No growth to date.        RADIOLOGY & ADDITIONAL TESTS:    Personally reviewed.     Consultant(s) Notes Reviewed:  [x] YES  [ ] NO

## 2021-08-16 NOTE — PROGRESS NOTE ADULT - SUBJECTIVE AND OBJECTIVE BOX
Patient is a 61y old  Male who presents with a chief complaint of acute hypoxic respiratory failure (13 Aug 2021 19:56)       HPI:  62 yo Male patient with PMHx of HTN, DM type 2 (insulin dependent) presents today complaining SOB and generalized weakness since 2021. Pt endorses that came back from [place] on  and tested positive for COVID-19, states he was in contact with a friend that tested positive. Since 2021 has been progressively worsening  generalized weakness and exertional dyspnea, associated with cough, decreased appetite with poor oral intake, decreases smell and one episode of vomiting today. Patient before was able to walk long distances, climb stairs w/o dyspna  At this time he denies  fever, chills, chest pain, palpitations, abdominal pain, diarrhea, constipation, urinary frequency, urgency, hematuria, hematochezia, melena or dysuria, changes in vision, dizziness, numbness, tingling. He is not vaccinated against covid19    ED course: VS  Afebrile, HR: 68, BP: 113/59, SpO2: 94%-> 98% 3 NC RR: 19  Labs significant for Neutrophils 84% D-dimer 323 Procalcitonin 0.41, Na 132 BUN/creatinine: 84/2.80, Glucose 427 GFR: 27, lactate 2.3. AB.4/30/103/22/30.0  Patient received 1 Lt NS bolus in the ED  (13 Aug 2021 19:56)    Renal is being consulted for SORAIDA/CKD. No urinary c/o.       PAST MEDICAL & SURGICAL HISTORY:  Hypertension    Diabetes mellitus    No significant past surgical history         FAMILY HISTORY:  FH: HTN (hypertension) (Father, Mother)    NC    Social History:Non smoker      MEDICATIONS  (STANDING):  dexAMETHasone     Tablet 6 milliGRAM(s) Oral daily  dextrose 40% Gel 15 Gram(s) Oral once  dextrose 5%. 1000 milliLiter(s) (50 mL/Hr) IV Continuous <Continuous>  dextrose 5%. 1000 milliLiter(s) (100 mL/Hr) IV Continuous <Continuous>  dextrose 50% Injectable 25 Gram(s) IV Push once  dextrose 50% Injectable 12.5 Gram(s) IV Push once  dextrose 50% Injectable 25 Gram(s) IV Push once  glucagon  Injectable 1 milliGRAM(s) IntraMuscular once  heparin   Injectable 5000 Unit(s) SubCutaneous every 8 hours  insulin glargine Injectable (LANTUS) 25 Unit(s) SubCutaneous every morning  insulin glargine Injectable (LANTUS) 25 Unit(s) SubCutaneous at bedtime  insulin lispro (ADMELOG) corrective regimen sliding scale   SubCutaneous three times a day before meals  insulin lispro Injectable (ADMELOG) 12 Unit(s) SubCutaneous three times a day before meals  remdesivir  IVPB   IV Intermittent     MEDICATIONS  (PRN):  acetaminophen   Tablet .. 650 milliGRAM(s) Oral every 6 hours PRN Temp greater or equal to 38.5C (101.3F), Mild Pain (1 - 3)  ondansetron Injectable 4 milliGRAM(s) IV Push every 8 hours PRN Nausea and/or Vomiting    I&O's Summary      PHYSICAL EXAM:  Vital Signs Last 24 Hrs  T(C): 36.4 (16 Aug 2021 13:00), Max: 36.4 (16 Aug 2021 05:30)  T(F): 97.6 (16 Aug 2021 13:00), Max: 97.6 (16 Aug 2021 05:30)  HR: 58 (16 Aug 2021 13:00) (52 - 58)  BP: 94/59 (16 Aug 2021 13:00) (93/61 - 109/69)  BP(mean): --  RR: 18 (16 Aug 2021 13:00) (18 - 18)  SpO2: 93% (16 Aug 2021 13:00) (91% - 94%)    CONSTITUTIONAL: NAD, well-developed  RESPIRATORY: Normal respiratory effort; lungs are clear to auscultation bilaterally  CARDIOVASCULAR: Regular rate and rhythm, normal S1 and S2, no murmur/rub/gallop; No lower extremity edema; Peripheral pulses are 2+ bilaterally  ABDOMEN: Nontender to palpation, normoactive bowel sounds, no rebound/guarding; No hepatosplenomegaly  MUSCLOSKELETAL: no clubbing or cyanosis of digits; no joint swelling or tenderness to palpation  NEURO: CN 2-12 grossly intact, moves all limbs spontaneously  PSYCH: A+O to person, place, and time; affect appropriate    LABS:                        13.0   7.54  )-----------( 191      ( 16 Aug 2021 09:16 )             35.8     08-16    136  |  102  |  70<H>  ----------------------------<  322<H>  4.9   |  25  |  1.90<H>    Ca    8.9      16 Aug 2021 09:16  Phos  3.9     08-16  Mg     3.3     08-16    TPro  7.1  /  Alb  2.4<L>  /  TBili  0.9  /  DBili  .20  /  AST  21  /  ALT  21  /  AlkPhos  49  08-16    PT/INR - ( 16 Aug 2021 09:16 )   PT: 12.2 sec;   INR: 1.04 ratio           Culture - Blood (collected 14 Aug 2021 01:02)  Source: .Blood Blood  Preliminary Report (15 Aug 2021 02:01):    No growth to date.    Culture - Blood (collected 14 Aug 2021 01:02)  Source: .Blood Blood  Preliminary Report (15 Aug 2021 02:01):    No growth to date.     Patient is a 61y old  Male who presents with a chief complaint of acute hypoxic respiratory failure (13 Aug 2021 19:56)       HPI:  62 yo Male patient with PMHx of HTN, DM type 2 (insulin dependent) presents today complaining SOB and generalized weakness since 2021. Pt endorses that came back from [place] on  and tested positive for COVID-19, states he was in contact with a friend that tested positive. Since 2021 has been progressively worsening  generalized weakness and exertional dyspnea, associated with cough, decreased appetite with poor oral intake, decreases smell and one episode of vomiting today. Patient before was able to walk long distances, climb stairs w/o dyspna  At this time he denies  fever, chills, chest pain, palpitations, abdominal pain, diarrhea, constipation, urinary frequency, urgency, hematuria, hematochezia, melena or dysuria, changes in vision, dizziness, numbness, tingling. He is not vaccinated against covid19    ED course: VS  Afebrile, HR: 68, BP: 113/59, SpO2: 94%-> 98% 3 NC RR: 19  Labs significant for Neutrophils 84% D-dimer 323 Procalcitonin 0.41, Na 132 BUN/creatinine: 84/2.80, Glucose 427 GFR: 27, lactate 2.3. AB.4/30/103/22/30.0  Patient received 1 Lt NS bolus in the ED  (13 Aug 2021 19:56)    Renal is being consulted for SORAIDA/CKD. No urinary c/o.       PAST MEDICAL & SURGICAL HISTORY:  Hypertension    Diabetes mellitus    No significant past surgical history         FAMILY HISTORY:  FH: HTN (hypertension) (Father, Mother)    NC    Social History:Non smoker      MEDICATIONS  (STANDING):  dexAMETHasone     Tablet 6 milliGRAM(s) Oral daily  dextrose 40% Gel 15 Gram(s) Oral once  dextrose 5%. 1000 milliLiter(s) (50 mL/Hr) IV Continuous <Continuous>  dextrose 5%. 1000 milliLiter(s) (100 mL/Hr) IV Continuous <Continuous>  dextrose 50% Injectable 25 Gram(s) IV Push once  dextrose 50% Injectable 12.5 Gram(s) IV Push once  dextrose 50% Injectable 25 Gram(s) IV Push once  glucagon  Injectable 1 milliGRAM(s) IntraMuscular once  heparin   Injectable 5000 Unit(s) SubCutaneous every 8 hours  insulin glargine Injectable (LANTUS) 25 Unit(s) SubCutaneous every morning  insulin glargine Injectable (LANTUS) 25 Unit(s) SubCutaneous at bedtime  insulin lispro (ADMELOG) corrective regimen sliding scale   SubCutaneous three times a day before meals  insulin lispro Injectable (ADMELOG) 12 Unit(s) SubCutaneous three times a day before meals  remdesivir  IVPB   IV Intermittent     MEDICATIONS  (PRN):  acetaminophen   Tablet .. 650 milliGRAM(s) Oral every 6 hours PRN Temp greater or equal to 38.5C (101.3F), Mild Pain (1 - 3)  ondansetron Injectable 4 milliGRAM(s) IV Push every 8 hours PRN Nausea and/or Vomiting    Allergies    calcium channel blockers (Other)    Intolerances         REVIEW OF SYSTEMS:    CONSTITUTIONAL:  No weight loss   EYES: No eye pain, visual disturbances, or discharge  ENMT:  No difficulty hearing, tinnitus, vertigo; No sinus or throat pain  NECK: No pain or stiffness  RESPIRATORY: +STEVENS; No wheeze.  CARDIOVASCULAR: No chest pain, palpitations, dizziness,   GASTROINTESTINAL: No abdominal or epigastric pain. No nausea, vomiting, or hematemesis; No diarrhea or constipation. No melena or hematochezia.  GENITOURINARY: No dysuria, frequency, hematuria, or incontinence  NEUROLOGICAL: No headaches, memory loss, loss of strength, numbness, or tremors  LYMPH NODES: No enlarged glands  MUSCULOSKELETAL: No joint pain or swelling         PHYSICAL EXAM:  Vital Signs Last 24 Hrs  T(C): 36.4 (21 @ 13:00), Max: 36.4 (21 @ 05:30)  HR: 58 (21 @ 13:00) (52 - 58)  BP: 94/59 (21 @ 13:00) (93/61 - 109/69)  RR: 18 (21 @ 13:00) (18 - 18)  SpO2: 93% (21 @ 13:00) (91% - 94%)    General: No apparent distress  Head: normocephalic, atraumatic  Eyes: EOMI, anicteric  ENT: moist mucous membranes, no pharyngeal exudates  Heart: rrr, S1, S2, no murmurs  Chest: CTA b/l, no rales, rhonchi, or wheezes  Abd: BS+, soft, NT, ND  Extr: no edema or cyanosis  Neuro: AA&Ox3, non-focal      LABS:                        13.0   7.54  )-----------( 191      ( 16 Aug 2021 09:16 )             35.8     08-16    136  |  102  |  70<H>  ----------------------------<  322<H>  4.9   |  25  |  1.90<H>    Ca    8.9      16 Aug 2021 09:16  Phos  3.9     08-16  Mg     3.3     08-16    TPro  7.1  /  Alb  2.4<L>  /  TBili  0.9  /  DBili  .20  /  AST  21  /  ALT  21  /  AlkPhos  49  08-16    PT/INR - ( 16 Aug 2021 09:16 )   PT: 12.2 sec;   INR: 1.04 ratio           Culture - Blood (collected 14 Aug 2021 01:02)  Source: .Blood Blood  Preliminary Report (15 Aug 2021 02:01):    No growth to date.    Culture - Blood (collected 14 Aug 2021 01:02)  Source: .Blood Blood  Preliminary Report (15 Aug 2021 02:01):    No growth to date.

## 2021-08-16 NOTE — PROGRESS NOTE ADULT - ASSESSMENT
60 yo Male patient with PMHx of HTN T2DM (insulin dependent) presents today complaining SOB and generalized weakness since 08/11/2021. Tested positive for COVID-19 on 08/05/2021. Patient admitted for acute respiratory failure secondary to COVID-19 PNA.    # Acute respiratory failure secondary to COVID-19 PNA  - admit w/ remote telemetry monitoring  - on nasal canula, maintain sats >92%  - Decadron 6mg x 10 days  - Remdesivir; day 2/5   - Tylenol prn myalgias, fever  - AC with heparin 5000 sq q8   - monitor inflammatory markers  - Full code   - ID Dr. Aguayo consulted, discussed, Dr Suh to see pt.     # SORAIDA on CKD, probably prerenal in the setting of dehydration   - improving renal function, Creat 2.8 --> 2.2 -->2.0 -->1.9   - ns 500cc today as pt looked dehydrated, SBP in 90s  -(cr 2.8 on admission). From prev chart review, last Creatinine was 1.3 in 2017 (unclear if this is baseline as it was years ago, but this is only baseline available for comparison), f/u nephrology  - Hold home meds- Edarbyclor  - monitor serum creatinine & urine output  - Consult Nephro- Dr. JOLENE Duenas     # DM type 2, uncontrolled  - uncontrolled blood glucose in setting of steroid use  - continue insulin regimen- lantus, premeal insulin, coverage scale  - endo consulted - apprec recs  - f/u HgbA1c - 10.6%  - Accu checks w/ hypoglycemic protocol    Thrombocytopenia, resolved  - likely 2/2 to covid-19 infxn    # Hypertension, chronic  - Hypotensive in the ED likely secondary to dehydration given poor intake   - patient takes Edarbyclor 40-12.5 mg qd  (azilsartan/chlorthalidone)  - Will hold in the setting of SORAIDA and  hypotension   - Monitor BP. If BPs elevated and pt still with elevated creatinine, consider amlodipine for bp control    # DVT Prophylaxis   - Heparin 5000 SQ q8

## 2021-08-16 NOTE — CONSULT NOTE ADULT - ASSESSMENT
Physical Exam:   Vital Signs Last 24 Hrs  T(C): 36.4 (16 Aug 2021 13:00), Max: 36.4 (16 Aug 2021 05:30)  T(F): 97.6 (16 Aug 2021 13:00), Max: 97.6 (16 Aug 2021 05:30)  HR: 58 (16 Aug 2021 13:00) (52 - 58)  BP: 94/59 (16 Aug 2021 13:00) (93/61 - 109/69)  BP(mean): --  RR: 18 (16 Aug 2021 13:00) (18 - 18)  SpO2: 93% (16 Aug 2021 13:00) (91% - 94%)       eGFR if Non African American: 37 mL/min/1.73M2 (08-16-21 @ 09:16)  eGFR if : 43 mL/min/1.73M2 (08-16-21 @ 09:16)  eGFR if Non African American: 35 mL/min/1.73M2 (08-15-21 @ 18:40)  eGFR if : 41 mL/min/1.73M2 (08-15-21 @ 18:40)  eGFR if Non African American: 35 mL/min/1.73M2 (08-15-21 @ 09:11)  eGFR if : 41 mL/min/1.73M2 (08-15-21 @ 09:11)  eGFR if Non African American: 31 mL/min/1.73M2 (08-14-21 @ 06:09)  eGFR if : 36 mL/min/1.73M2 (08-14-21 @ 06:09)  eGFR if Non African American: 23 mL/min/1.73M2 (08-13-21 @ 18:13)  eGFR if : 27 mL/min/1.73M2 (08-13-21 @ 18:13)      CAPILLARY BLOOD GLUCOSE      POCT Blood Glucose.: 317 mg/dL (16 Aug 2021 12:11)  POCT Blood Glucose.: 329 mg/dL (16 Aug 2021 08:26)  POCT Blood Glucose.: 295 mg/dL (16 Aug 2021 00:13)  POCT Blood Glucose.: 283 mg/dL (15 Aug 2021 22:18)  POCT Blood Glucose.: 377 mg/dL (15 Aug 2021 17:03)      Cholesterol, Serum: 113 mg/dL (05.19.21 @ 08:36)     HDL Cholesterol, Serum: 22 mg/dL (05.19.21 @ 08:36)     LDL Cholesterol Calculated: 66 mg/dL (05.19.21 @ 08:36)     DIET: CC

## 2021-08-16 NOTE — CONSULT NOTE ADULT - SUBJECTIVE AND OBJECTIVE BOX
Patient is a 61y old  Male who presents with a chief complaint of acute hypoxic respiratory failure (16 Aug 2021 14:14)    Type:2DX 2015 year. a1c 10.6% states last one was same. home regimen: Levemir pen 50 Units HS since - has all insulin/pens/meter supplies. test 2x day.  no endocrine provider- PCP jayson    HPI:  60 yo Male patient with PMHx of HTN, DM type 2 (insulin dependent) presents today complaining SOB and generalized weakness since 2021. Pt endorses that came back from [place] on  and tested positive for COVID-19, states he was in contact with a friend that tested positive. Since 2021 has been progressively worsening  generalized weakness and exertional dyspnea, associated with cough, decreased appetite with poor oral intake, decreases smell and one episode of vomiting today. Patient before was able to walk long distances, climb stairs w/o dyspna  At this time he denies  fever, chills, chest pain, palpitations, abdominal pain, diarrhea, constipation, urinary frequency, urgency, hematuria, hematochezia, melena or dysuria, changes in vision, dizziness, numbness, tingling. He is not vaccinated against covid19    ED course: VS  Afebrile, HR: 68, BP: 113/59, SpO2: 94%-> 98% 3 NC RR: 19  Labs significant for Neutrophils 84% D-dimer 323 Procalcitonin 0.41, Na 132 BUN/creatinine: 84/2.80, Glucose 427 GFR: 27, lactate 2.3. AB.4/30/103/22/30.0  Patient received 1 Lt NS bolus in the ED  (13 Aug 2021 19:56)      PAST MEDICAL & SURGICAL HISTORY:  Hypertension    Diabetes mellitus    No significant past surgical history        Allergies    calcium channel blockers (Other)    Intolerances        MEDICATIONS  (STANDING):  dexAMETHasone     Tablet 6 milliGRAM(s) Oral daily  dextrose 40% Gel 15 Gram(s) Oral once  dextrose 5%. 1000 milliLiter(s) (50 mL/Hr) IV Continuous <Continuous>  dextrose 5%. 1000 milliLiter(s) (100 mL/Hr) IV Continuous <Continuous>  dextrose 50% Injectable 25 Gram(s) IV Push once  dextrose 50% Injectable 12.5 Gram(s) IV Push once  dextrose 50% Injectable 25 Gram(s) IV Push once  glucagon  Injectable 1 milliGRAM(s) IntraMuscular once  heparin   Injectable 5000 Unit(s) SubCutaneous every 8 hours  insulin glargine Injectable (LANTUS) 25 Unit(s) SubCutaneous every morning  insulin glargine Injectable (LANTUS) 25 Unit(s) SubCutaneous at bedtime  insulin lispro (ADMELOG) corrective regimen sliding scale   SubCutaneous three times a day before meals  insulin lispro Injectable (ADMELOG) 12 Unit(s) SubCutaneous three times a day before meals  remdesivir  IVPB   IV Intermittent

## 2021-08-17 DIAGNOSIS — R00.1 BRADYCARDIA, UNSPECIFIED: ICD-10-CM

## 2021-08-17 DIAGNOSIS — J96.01 ACUTE RESPIRATORY FAILURE WITH HYPOXIA: ICD-10-CM

## 2021-08-17 LAB
ALBUMIN SERPL ELPH-MCNC: 2.3 G/DL — LOW (ref 3.3–5)
ALBUMIN SERPL ELPH-MCNC: 2.4 G/DL — LOW (ref 3.3–5)
ALP SERPL-CCNC: 50 U/L — SIGNIFICANT CHANGE UP (ref 40–120)
ALP SERPL-CCNC: 52 U/L — SIGNIFICANT CHANGE UP (ref 40–120)
ALT FLD-CCNC: 18 U/L — SIGNIFICANT CHANGE UP (ref 12–78)
ALT FLD-CCNC: 19 U/L — SIGNIFICANT CHANGE UP (ref 12–78)
ANION GAP SERPL CALC-SCNC: 7 MMOL/L — SIGNIFICANT CHANGE UP (ref 5–17)
AST SERPL-CCNC: 22 U/L — SIGNIFICANT CHANGE UP (ref 15–37)
AST SERPL-CCNC: 22 U/L — SIGNIFICANT CHANGE UP (ref 15–37)
BILIRUB DIRECT SERPL-MCNC: 0.2 MG/DL — SIGNIFICANT CHANGE UP (ref 0.05–0.2)
BILIRUB INDIRECT FLD-MCNC: 1.1 MG/DL — HIGH (ref 0.2–1)
BILIRUB SERPL-MCNC: 1.3 MG/DL — HIGH (ref 0.2–1.2)
BILIRUB SERPL-MCNC: 1.3 MG/DL — HIGH (ref 0.2–1.2)
BUN SERPL-MCNC: 65 MG/DL — HIGH (ref 7–23)
CALCIUM SERPL-MCNC: 8.6 MG/DL — SIGNIFICANT CHANGE UP (ref 8.5–10.1)
CHLORIDE SERPL-SCNC: 107 MMOL/L — SIGNIFICANT CHANGE UP (ref 96–108)
CO2 SERPL-SCNC: 23 MMOL/L — SIGNIFICANT CHANGE UP (ref 22–31)
CREAT SERPL-MCNC: 1.6 MG/DL — HIGH (ref 0.5–1.3)
GLUCOSE SERPL-MCNC: 178 MG/DL — HIGH (ref 70–99)
HCT VFR BLD CALC: 35.8 % — LOW (ref 39–50)
HGB BLD-MCNC: 13 G/DL — SIGNIFICANT CHANGE UP (ref 13–17)
INR BLD: 1.1 RATIO — SIGNIFICANT CHANGE UP (ref 0.88–1.16)
MAGNESIUM SERPL-MCNC: 3.1 MG/DL — HIGH (ref 1.6–2.6)
MCHC RBC-ENTMCNC: 29.7 PG — SIGNIFICANT CHANGE UP (ref 27–34)
MCHC RBC-ENTMCNC: 36.3 GM/DL — HIGH (ref 32–36)
MCV RBC AUTO: 81.9 FL — SIGNIFICANT CHANGE UP (ref 80–100)
NRBC # BLD: 0 /100 WBCS — SIGNIFICANT CHANGE UP (ref 0–0)
PHOSPHATE SERPL-MCNC: 3.3 MG/DL — SIGNIFICANT CHANGE UP (ref 2.5–4.5)
PLATELET # BLD AUTO: 215 K/UL — SIGNIFICANT CHANGE UP (ref 150–400)
POTASSIUM SERPL-MCNC: 4.3 MMOL/L — SIGNIFICANT CHANGE UP (ref 3.5–5.3)
POTASSIUM SERPL-SCNC: 4.3 MMOL/L — SIGNIFICANT CHANGE UP (ref 3.5–5.3)
PROT SERPL-MCNC: 6.8 G/DL — SIGNIFICANT CHANGE UP (ref 6–8.3)
PROT SERPL-MCNC: 7 G/DL — SIGNIFICANT CHANGE UP (ref 6–8.3)
PROTHROM AB SERPL-ACNC: 12.8 SEC — SIGNIFICANT CHANGE UP (ref 10.6–13.6)
RBC # BLD: 4.37 M/UL — SIGNIFICANT CHANGE UP (ref 4.2–5.8)
RBC # FLD: 13.5 % — SIGNIFICANT CHANGE UP (ref 10.3–14.5)
SODIUM SERPL-SCNC: 137 MMOL/L — SIGNIFICANT CHANGE UP (ref 135–145)
WBC # BLD: 10.46 K/UL — SIGNIFICANT CHANGE UP (ref 3.8–10.5)
WBC # FLD AUTO: 10.46 K/UL — SIGNIFICANT CHANGE UP (ref 3.8–10.5)

## 2021-08-17 PROCEDURE — 99232 SBSQ HOSP IP/OBS MODERATE 35: CPT

## 2021-08-17 RX ADMIN — SODIUM CHLORIDE 500 MILLILITER(S): 9 INJECTION INTRAMUSCULAR; INTRAVENOUS; SUBCUTANEOUS at 00:23

## 2021-08-17 RX ADMIN — HEPARIN SODIUM 5000 UNIT(S): 5000 INJECTION INTRAVENOUS; SUBCUTANEOUS at 13:45

## 2021-08-17 RX ADMIN — Medication 12 UNIT(S): at 08:21

## 2021-08-17 RX ADMIN — HEPARIN SODIUM 5000 UNIT(S): 5000 INJECTION INTRAVENOUS; SUBCUTANEOUS at 00:22

## 2021-08-17 RX ADMIN — Medication 6 MILLIGRAM(S): at 06:22

## 2021-08-17 RX ADMIN — Medication 4: at 12:27

## 2021-08-17 RX ADMIN — Medication 12 UNIT(S): at 17:31

## 2021-08-17 RX ADMIN — Medication 2: at 17:30

## 2021-08-17 RX ADMIN — Medication 12 UNIT(S): at 12:27

## 2021-08-17 RX ADMIN — INSULIN GLARGINE 25 UNIT(S): 100 INJECTION, SOLUTION SUBCUTANEOUS at 21:42

## 2021-08-17 RX ADMIN — INSULIN GLARGINE 25 UNIT(S): 100 INJECTION, SOLUTION SUBCUTANEOUS at 08:20

## 2021-08-17 RX ADMIN — HEPARIN SODIUM 5000 UNIT(S): 5000 INJECTION INTRAVENOUS; SUBCUTANEOUS at 06:21

## 2021-08-17 RX ADMIN — Medication 2: at 08:21

## 2021-08-17 RX ADMIN — REMDESIVIR 500 MILLIGRAM(S): 5 INJECTION INTRAVENOUS at 00:21

## 2021-08-17 RX ADMIN — HEPARIN SODIUM 5000 UNIT(S): 5000 INJECTION INTRAVENOUS; SUBCUTANEOUS at 21:42

## 2021-08-17 RX ADMIN — INSULIN GLARGINE 25 UNIT(S): 100 INJECTION, SOLUTION SUBCUTANEOUS at 00:21

## 2021-08-17 NOTE — PROGRESS NOTE ADULT - SUBJECTIVE AND OBJECTIVE BOX
Patient is a 61y old  Male who presents with a chief complaint of acute hypoxic respiratory failure (17 Aug 2021 13:45)      INTERVAL HPI/OVERNIGHT EVENTS: Patient seen and examined at bedside. No overnight events occurred. Patient has no complaints at this time.     MEDICATIONS  (STANDING):  dexAMETHasone     Tablet 6 milliGRAM(s) Oral daily  dextrose 40% Gel 15 Gram(s) Oral once  dextrose 5%. 1000 milliLiter(s) (50 mL/Hr) IV Continuous <Continuous>  dextrose 5%. 1000 milliLiter(s) (100 mL/Hr) IV Continuous <Continuous>  dextrose 50% Injectable 25 Gram(s) IV Push once  dextrose 50% Injectable 12.5 Gram(s) IV Push once  dextrose 50% Injectable 25 Gram(s) IV Push once  glucagon  Injectable 1 milliGRAM(s) IntraMuscular once  heparin   Injectable 5000 Unit(s) SubCutaneous every 8 hours  insulin glargine Injectable (LANTUS) 25 Unit(s) SubCutaneous every morning  insulin glargine Injectable (LANTUS) 25 Unit(s) SubCutaneous at bedtime  insulin lispro (ADMELOG) corrective regimen sliding scale   SubCutaneous three times a day before meals  insulin lispro Injectable (ADMELOG) 12 Unit(s) SubCutaneous three times a day before meals  remdesivir  IVPB   IV Intermittent   remdesivir  IVPB 100 milliGRAM(s) IV Intermittent every 24 hours    MEDICATIONS  (PRN):  acetaminophen   Tablet .. 650 milliGRAM(s) Oral every 6 hours PRN Temp greater or equal to 38.5C (101.3F), Mild Pain (1 - 3)  ondansetron Injectable 4 milliGRAM(s) IV Push every 8 hours PRN Nausea and/or Vomiting      Allergies    calcium channel blockers (Other)    Intolerances        REVIEW OF SYSTEMS:  CONSTITUTIONAL: No fever or chills +fatigue  HEENT:  No headache, no sore throat  RESPIRATORY: No wheezing. +Cough, +shortness of breath  CARDIOVASCULAR: No chest pain, palpitations  GASTROINTESTINAL: No abd pain, nausea, vomiting, or diarrhea  GENITOURINARY: No dysuria, frequency, or hematuria  NEUROLOGICAL: no focal weakness or dizziness  MUSCULOSKELETAL: no myalgias     Vital Signs Last 24 Hrs  T(C): 36.6 (17 Aug 2021 05:43), Max: 36.9 (16 Aug 2021 22:10)  T(F): 97.8 (17 Aug 2021 05:43), Max: 98.4 (16 Aug 2021 22:10)  HR: 53 (17 Aug 2021 05:43) (53 - 59)  BP: 113/72 (17 Aug 2021 05:43) (112/65 - 113/72)  BP(mean): --  RR: 18 (17 Aug 2021 05:43) (18 - 18)  SpO2: 92% (17 Aug 2021 05:43) (92% - 97%)    PHYSICAL EXAM:  GENERAL: NAD  HEENT:  anicteric, moist mucous membranes  CHEST/LUNG: few scattered  rale, no wheezes, or rhonchi  HEART:  RRR, S1, S2  ABDOMEN:  BS+, soft, nontender, nondistended  EXTREMITIES: no edema, cyanosis, or calf tenderness  NERVOUS SYSTEM: answers questions and follows commands appropriately    LABS:                        13.0   10.46 )-----------( 215      ( 17 Aug 2021 09:22 )             35.8     CBC Full  -  ( 17 Aug 2021 09:22 )  WBC Count : 10.46 K/uL  Hemoglobin : 13.0 g/dL  Hematocrit : 35.8 %  Platelet Count - Automated : 215 K/uL  Mean Cell Volume : 81.9 fl  Mean Cell Hemoglobin : 29.7 pg  Mean Cell Hemoglobin Concentration : 36.3 gm/dL  Auto Neutrophil # : x  Auto Lymphocyte # : x  Auto Monocyte # : x  Auto Eosinophil # : x  Auto Basophil # : x  Auto Neutrophil % : x  Auto Lymphocyte % : x  Auto Monocyte % : x  Auto Eosinophil % : x  Auto Basophil % : x    17 Aug 2021 09:13    137    |  107    |  65     ----------------------------<  178    4.3     |  23     |  1.60     Ca    8.6        17 Aug 2021 09:13  Phos  3.3       17 Aug 2021 09:13  Mg     3.1       17 Aug 2021 09:13    TPro  6.8    /  Alb  2.3    /  TBili  1.3    /  DBili  .20    /  AST  22     /  ALT  18     /  AlkPhos  50     17 Aug 2021 09:13    PT/INR - ( 17 Aug 2021 09:13 )   PT: 12.8 sec;   INR: 1.10 ratio             CAPILLARY BLOOD GLUCOSE      POCT Blood Glucose.: 212 mg/dL (17 Aug 2021 12:22)  POCT Blood Glucose.: 188 mg/dL (17 Aug 2021 08:14)  POCT Blood Glucose.: 206 mg/dL (17 Aug 2021 00:19)  POCT Blood Glucose.: 269 mg/dL (16 Aug 2021 20:23)  POCT Blood Glucose.: 281 mg/dL (16 Aug 2021 17:07)        Culture - Blood (collected 08-14-21 @ 01:02)  Source: .Blood Blood  Preliminary Report (08-15-21 @ 02:01):    No growth to date.    Culture - Blood (collected 08-14-21 @ 01:02)  Source: .Blood Blood  Preliminary Report (08-15-21 @ 02:01):    No growth to date.        RADIOLOGY & ADDITIONAL TESTS:    Personally reviewed.     Consultant(s) Notes Reviewed:  [x] YES  [ ] NO

## 2021-08-17 NOTE — PROGRESS NOTE ADULT - ASSESSMENT
60 yo Male patient with PMHx of HTN T2DM (insulin dependent) presents today complaining SOB and generalized weakness since 08/11/2021. Tested positive for COVID-19 on 08/05/2021. Patient admitted for acute respiratory failure secondary to COVID-19 PNA.  - clinically stable, on 2L NC sating >90s, complains of shortness of breath w/  mild exertion and weakness     # Acute respiratory failure secondary to COVID-19 PNA  - admit w/ remote telemetry monitoring  - on nasal canula 2L NC, maintain sats >92%, cont pulse ox monitoring  - Decadron 6mg x 10 days & Remdesivir; day 3/5   - Tylenol prn myalgias, fever  - AC with heparin 5000 sq q8   - monitor inflammatory markers  - Infectious disease following.    # SORAIDA on CKD, probably prerenal in the setting of dehydration   - improving renal function with hydration, Creat 2.8 --> 2.2 -->2.0 -->1.9  -> 1.6  -(cr 2.8 on admission). From chart review, last Creatinine was 1.3 in 2017 (unclear if this is baseline as it was years ago, but this is only baseline available for comparison), f/u nephrology  - Hold home meds- Edarbyclor - BP running low    # DM type 2, uncontrolled  - uncontrolled blood glucose in setting of steroid use and A1c of 10.6%  - continue insulin regimen- lantus, premeal insulin, coverage scale  - endo consulted - apprec recs  - Accu checks w/ hypoglycemic protocol    Thrombocytopenia, resolved  - likely 2/2 to covid-19 infxn    # Hypertension, chronic  - Hypotensive off anthypertensives  - patient takes Edarbyclor 40-12.5 mg qd  (azilsartan/chlorthalidone) - on hold due to low BP and SORAIDA  BP: (112/65 - 113/72)    # DVT Prophylaxis   - Heparin 5000 SQ q8

## 2021-08-17 NOTE — PROGRESS NOTE ADULT - SUBJECTIVE AND OBJECTIVE BOX
bronson is a 61y old  Male who presents with a chief complaint of acute hypoxic respiratory failure (13 Aug 2021 19:56)       HPI:  62 yo Male patient with PMHx of HTN, DM type 2 (insulin dependent) presents today complaining SOB and generalized weakness since 2021. Pt endorses that came back from [place] on  and tested positive for COVID-19, states he was in contact with a friend that tested positive. Since 2021 has been progressively worsening  generalized weakness and exertional dyspnea, associated with cough, decreased appetite with poor oral intake, decreases smell and one episode of vomiting today. Patient before was able to walk long distances, climb stairs w/o dyspna  At this time he denies  fever, chills, chest pain, palpitations, abdominal pain, diarrhea, constipation, urinary frequency, urgency, hematuria, hematochezia, melena or dysuria, changes in vision, dizziness, numbness, tingling. He is not vaccinated against covid19    ED course: VS  Afebrile, HR: 68, BP: 113/59, SpO2: 94%-> 98% 3 NC RR: 19  Labs significant for Neutrophils 84% D-dimer 323 Procalcitonin 0.41, Na 132 BUN/creatinine: 84/2.80, Glucose 427 GFR: 27, lactate 2.3. AB.4/30/103/22/30.0  Patient received 1 Lt NS bolus in the ED  (13 Aug 2021 19:56)    Renal is being consulted for SORAIDA/CKD. No urinary c/o.         PAST MEDICAL & SURGICAL HISTORY:  Hypertension    Diabetes mellitus    No significant past surgical history         FAMILY HISTORY:  FH: HTN (hypertension) (Father, Mother)    NC    Social History:Non smoker    MEDICATIONS  (STANDING):  dexAMETHasone     Tablet 6 milliGRAM(s) Oral daily  dextrose 40% Gel 15 Gram(s) Oral once  dextrose 5%. 1000 milliLiter(s) (50 mL/Hr) IV Continuous <Continuous>  dextrose 5%. 1000 milliLiter(s) (100 mL/Hr) IV Continuous <Continuous>  dextrose 50% Injectable 25 Gram(s) IV Push once  dextrose 50% Injectable 12.5 Gram(s) IV Push once  dextrose 50% Injectable 25 Gram(s) IV Push once  glucagon  Injectable 1 milliGRAM(s) IntraMuscular once  heparin   Injectable 5000 Unit(s) SubCutaneous every 8 hours  insulin glargine Injectable (LANTUS) 25 Unit(s) SubCutaneous every morning  insulin glargine Injectable (LANTUS) 25 Unit(s) SubCutaneous at bedtime  insulin lispro (ADMELOG) corrective regimen sliding scale   SubCutaneous three times a day before meals  insulin lispro Injectable (ADMELOG) 12 Unit(s) SubCutaneous three times a day before meals  remdesivir  IVPB   IV Intermittent   remdesivir  IVPB 100 milliGRAM(s) IV Intermittent every 24 hours    MEDICATIONS  (PRN):  acetaminophen   Tablet .. 650 milliGRAM(s) Oral every 6 hours PRN Temp greater or equal to 38.5C (101.3F), Mild Pain (1 - 3)  ondansetron Injectable 4 milliGRAM(s) IV Push every 8 hours PRN Nausea and/or Vomiting      Allergies    calcium channel blockers (Other)    Intolerances       REVIEW OF SYSTEMS:    CONSTITUTIONAL:  No weight loss   EYES: No eye pain, visual disturbances, or discharge  ENMT:  No difficulty hearing, tinnitus, vertigo; No sinus or throat pain  NECK: No pain or stiffness  RESPIRATORY: +STEVENS; No wheeze.  CARDIOVASCULAR: No chest pain, palpitations, dizziness,   GASTROINTESTINAL: No abdominal or epigastric pain. No nausea, vomiting, or hematemesis; No diarrhea or constipation. No melena or hematochezia.  GENITOURINARY: No dysuria, frequency, hematuria, or incontinence  NEUROLOGICAL: No headaches, memory loss, loss of strength, numbness, or tremors  LYMPH NODES: No enlarged glands  MUSCULOSKELETAL: No joint pain or swelling       I&O's Summary    16 Aug 2021 07:  -  17 Aug 2021 07:00  --------------------------------------------------------  IN: 990 mL / OUT: 0 mL / NET: 990 mL    17 Aug 2021 07:  -  17 Aug 2021 11:50  --------------------------------------------------------  IN: 0 mL / OUT: 400 mL / NET: -400 mL        PHYSICAL EXAM:  Vital Signs Last 24 Hrs  T(C): 36.6 (17 Aug 2021 05:43), Max: 36.9 (16 Aug 2021 22:10)  T(F): 97.8 (17 Aug 2021 05:43), Max: 98.4 (16 Aug 2021 22:10)  HR: 53 (17 Aug 2021 05:43) (53 - 59)  BP: 113/72 (17 Aug 2021 05:43) (94/59 - 113/72)  BP(mean): --  RR: 18 (17 Aug 2021 05:43) (18 - 18)  SpO2: 92% (17 Aug 2021 05:43) (92% - 97%)    CONSTITUTIONAL: NAD, well-developed  RESPIRATORY: Normal respiratory effort; lungs are clear to auscultation bilaterally  CARDIOVASCULAR: Regular rate and rhythm, normal S1 and S2, no murmur/rub/gallop; No lower extremity edema; Peripheral pulses are 2+ bilaterally  ABDOMEN: Nontender to palpation, normoactive bowel sounds, no rebound/guarding; No hepatosplenomegaly  MUSCLOSKELETAL: no clubbing or cyanosis of digits; no joint swelling or tenderness to palpation  NEURO: CN 2-12 grossly intact, moves all limbs spontaneously  PSYCH: A+O to person, place, and time; affect appropriate      LABS:                        13.0   10.46 )-----------( 215      ( 17 Aug 2021 09:22 )             35.8     08-17    137  |  107  |  65<H>  ----------------------------<  178<H>  4.3   |  23  |  1.60<H>    Ca    8.6      17 Aug 2021 09:13  Phos  3.3     08-17  Mg     3.1     08-17    TPro  6.8  /  Alb  2.3<L>  /  TBili  1.3<H>  /  DBili  .20  /  AST  22  /  ALT  18  /  AlkPhos  50  08-17    PT/INR - ( 17 Aug 2021 09:13 )   PT: 12.8 sec;   INR: 1.10 ratio              Walk in bronson is a 61y old  Male who presents with a chief complaint of acute hypoxic respiratory failure (13 Aug 2021 19:56)       HPI:  62 yo Male patient with PMHx of HTN, DM type 2 (insulin dependent) presents today complaining SOB and generalized weakness since 08/11/2021. Pt endorses that came back from [place] on August 5th and tested positive for COVID-19, states he was in contact with a friend that tested positive. Since 08/11/2021 has been progressively worsening  generalized weakness and exertional dyspnea, associated with cough, decreased appetite with poor oral intake, decreases smell and one episode of vomiting today. Patient before was able to walk long distances, climb stairs w/o dyspna  At this time he denies  fever, chills, chest pain, palpitations, abdominal pain, diarrhea, constipation, urinary frequency, urgency, hematuria, hematochezia, melena or dysuria, changes in vision, dizziness, numbness, tingling. He is not vaccinated against covid19  Renal is being consulted for SORAIDA/CKD. No urinary c/o.     SOB stable. NO N/V.    PAST MEDICAL & SURGICAL HISTORY:  Hypertension    Diabetes mellitus    No significant past surgical history         FAMILY HISTORY:  FH: HTN (hypertension) (Father, Mother)    NC    Social History:Non smoker    MEDICATIONS  (STANDING):  dexAMETHasone     Tablet 6 milliGRAM(s) Oral daily  dextrose 40% Gel 15 Gram(s) Oral once  dextrose 5%. 1000 milliLiter(s) (50 mL/Hr) IV Continuous <Continuous>  dextrose 5%. 1000 milliLiter(s) (100 mL/Hr) IV Continuous <Continuous>  dextrose 50% Injectable 25 Gram(s) IV Push once  dextrose 50% Injectable 12.5 Gram(s) IV Push once  dextrose 50% Injectable 25 Gram(s) IV Push once  glucagon  Injectable 1 milliGRAM(s) IntraMuscular once  heparin   Injectable 5000 Unit(s) SubCutaneous every 8 hours  insulin glargine Injectable (LANTUS) 25 Unit(s) SubCutaneous every morning  insulin glargine Injectable (LANTUS) 25 Unit(s) SubCutaneous at bedtime  insulin lispro (ADMELOG) corrective regimen sliding scale   SubCutaneous three times a day before meals  insulin lispro Injectable (ADMELOG) 12 Unit(s) SubCutaneous three times a day before meals  remdesivir  IVPB   IV Intermittent   remdesivir  IVPB 100 milliGRAM(s) IV Intermittent every 24 hours    MEDICATIONS  (PRN):  acetaminophen   Tablet .. 650 milliGRAM(s) Oral every 6 hours PRN Temp greater or equal to 38.5C (101.3F), Mild Pain (1 - 3)  ondansetron Injectable 4 milliGRAM(s) IV Push every 8 hours PRN Nausea and/or Vomiting      Allergies    calcium channel blockers (Other)    Intolerances       REVIEW OF SYSTEMS:    CONSTITUTIONAL:  No weight loss   EYES: No eye pain, visual disturbances, or discharge  ENMT:  No difficulty hearing, tinnitus, vertigo; No sinus or throat pain  NECK: No pain or stiffness  RESPIRATORY: +STEVENS; No wheeze.  CARDIOVASCULAR: No chest pain, palpitations, dizziness,   GASTROINTESTINAL: No abdominal or epigastric pain. No nausea, vomiting, or hematemesis; No diarrhea or constipation. No melena or hematochezia.  GENITOURINARY: No dysuria, frequency, hematuria, or incontinence  NEUROLOGICAL: No headaches, memory loss, loss of strength, numbness, or tremors  LYMPH NODES: No enlarged glands  MUSCULOSKELETAL: No joint pain or swelling       I&O's Summary    16 Aug 2021 07:01  -  17 Aug 2021 07:00  --------------------------------------------------------  IN: 990 mL / OUT: 0 mL / NET: 990 mL    17 Aug 2021 07:01  -  17 Aug 2021 11:50  --------------------------------------------------------  IN: 0 mL / OUT: 400 mL / NET: -400 mL        PHYSICAL EXAM:  Vital Signs Last 24 Hrs  T(C): 36.6 (17 Aug 2021 05:43), Max: 36.9 (16 Aug 2021 22:10)  T(F): 97.8 (17 Aug 2021 05:43), Max: 98.4 (16 Aug 2021 22:10)  HR: 53 (17 Aug 2021 05:43) (53 - 59)  BP: 113/72 (17 Aug 2021 05:43) (94/59 - 113/72)  BP(mean): --  RR: 18 (17 Aug 2021 05:43) (18 - 18)  SpO2: 92% (17 Aug 2021 05:43) (92% - 97%)    CONSTITUTIONAL: NAD, well-developed  RESPIRATORY: Normal respiratory effort; lungs are clear to auscultation bilaterally  CARDIOVASCULAR: Regular rate and rhythm, normal S1 and S2, no murmur/rub/gallop; No lower extremity edema; Peripheral pulses are 2+ bilaterally  ABDOMEN: Nontender to palpation, normoactive bowel sounds, no rebound/guarding; No hepatosplenomegaly  MUSCLOSKELETAL: no clubbing or cyanosis of digits; no joint swelling or tenderness to palpation  NEURO: CN 2-12 grossly intact, moves all limbs spontaneously  PSYCH: A+O to person, place, and time; affect appropriate      LABS:                        13.0   10.46 )-----------( 215      ( 17 Aug 2021 09:22 )             35.8     08-17    137  |  107  |  65<H>  ----------------------------<  178<H>  4.3   |  23  |  1.60<H>    Ca    8.6      17 Aug 2021 09:13  Phos  3.3     08-17  Mg     3.1     08-17    TPro  6.8  /  Alb  2.3<L>  /  TBili  1.3<H>  /  DBili  .20  /  AST  22  /  ALT  18  /  AlkPhos  50  08-17    PT/INR - ( 17 Aug 2021 09:13 )   PT: 12.8 sec;   INR: 1.10 ratio              [employer]

## 2021-08-17 NOTE — PROGRESS NOTE ADULT - SUBJECTIVE AND OBJECTIVE BOX
[facility]  INFECTIOUS DISEASES   [address] [place] [address]  Tel: [phone]     Fax: [phone]  =======================================================    MARIA LUZ ROCK [id number]    Follow up: COVID19    No complaint, minimal cough, no SOB, on NC.   No fever. No GI symptoms.   Renal function improving.     Allergies:  calcium channel blockers (Other)    Antibiotics:  dexAMETHasone     Tablet 6 milliGRAM(s) Oral daily  remdesivir  IVPB   IV Intermittent        REVIEW OF SYSTEMS:  CONSTITUTIONAL:  No Fever or chills  HEENT:   No diplopia or blurred vision.  No earache, sore throat or runny nose.  CARDIOVASCULAR:  No chest pain or SOB  RESPIRATORY:  No cough, shortness of breath, PND or orthopnea.  GASTROINTESTINAL:  No nausea, vomiting or diarrhea.  GENITOURINARY:  No dysuria, frequency or urgency. No Blood in urine  MUSCULOSKELETAL:  no joint aches, no muscle pain  SKIN:  No change in skin, hair or nails.  NEUROLOGIC:  No paresthesias or weakness.  PSYCHIATRIC:  No disorder of thought or mood.  ENDOCRINE:  No heat or cold intolerance, polyuria or polydipsia.  HEMATOLOGICAL:  No easy bruising or bleeding.      Physical Exam:  Vital Signs Last 24 Hrs  T(C): 36.6 (17 Aug 2021 05:43), Max: 36.9 (16 Aug 2021 22:10)  T(F): 97.8 (17 Aug 2021 05:43), Max: 98.4 (16 Aug 2021 22:10)  HR: 53 (17 Aug 2021 05:43) (53 - 59)  BP: 113/72 (17 Aug 2021 05:43) (112/65 - 113/72)  BP(mean): --  RR: 18 (17 Aug 2021 05:43) (18 - 18)  SpO2: 92% (17 Aug 2021 05:43) (92% - 97%)  GEN: NAD  HEENT: normocephalic and atraumatic. EOMI. RUKHSANA.    NECK: Supple.  No lymphadenopathy   LUNGS: Clear to auscultation.  HEART: Regular rate and rhythm without murmur.  ABDOMEN: Soft, nontender, and nondistended.  Positive bowel sounds.    : No CVA tenderness  EXTREMITIES: Without any cyanosis, clubbing, rash, lesions or edema.  MSK: no joint swelling  NEUROLOGIC: grossly intact.  PSYCHIATRIC: Appropriate affect .  SKIN: No ulceration or induration present.      Labs:                        13.0   10.46 )-----------( 215      ( 17 Aug 2021 09:22 )             35.8         137  |  107  |  65<H>  ----------------------------<  178<H>  4.3   |  23  |  1.60<H>    Ca    8.6      17 Aug 2021 09:13  Phos  3.3       Mg     3.1         TPro  6.8  /  Alb  2.3<L>  /  TBili  1.3<H>  /  DBili  .20  /  AST  22  /  ALT  18  /  AlkPhos  50        Culture - Blood (collected 21 @ 01:02)  Source: .Blood Blood    Culture - Blood (collected 21 @ 01:02)  Source: .Blood Blood    WBC Count: 10.46 K/uL (21 @ 09:22)  WBC Count: 7.54 K/uL (21 @ 09:16)  WBC Count: 6.91 K/uL (08-15-21 @ 10:21)  WBC Count: 5.86 K/uL (08-15-21 @ 09:11)  WBC Count: 5.87 K/uL (21 @ 06:09)  WBC Count: 5.31 K/uL (21 @ 18:13)    Creatinine, Serum: 1.60 mg/dL (21 @ 09:13)  Creatinine, Serum: 1.90 mg/dL (21 @ 09:16)  Creatinine, Serum: 2.00 mg/dL (08-15-21 @ 18:40)  Creatinine, Serum: 2.00 mg/dL (08-15-21 @ 09:11)  Creatinine, Serum: 2.20 mg/dL (21 @ 06:09)  Creatinine, Serum: 2.80 mg/dL (21 @ 18:13)    C-Reactive Protein, Serum: 72 mg/L (21 @ 02:26)    Ferritin, Serum: 1084 ng/mL (21 @ 01:40)    Procalcitonin, Serum: 0.41 ng/mL (21 @ 18:32)     COVID-19 PCR: Detected (21 @ 18:13)    All imaging and data are reviewed.   < from: Xray Chest 1 View-PORTABLE IMMEDIATE (21 @ 18:28) >  EXAM:  XR CHEST PORTABLE IMMED 1V                        PROCEDURE DATE:  2021    INTERPRETATION:  Short of breath, Covid positive  AP chest. Prior 5/15/2017.  Heart magnified by AP film shallow inspiration. Patchy left basilar retrocardiac atelectasis/infiltrate. Correlate for infection. No pleural effusion. Calcific bursitis left shoulder.  IMPRESSION: Patchy left basilar retrocardiac atelectasis/infiltrate.    Assessment and Plan:   61M with COVID and uncontrolled DM2   CXR (I personally reviewed) with left lung opacity     Remdesivir has not been shown to impact mortality. Thus far it has been shown to accomplish is decrease the length of hospitalization in patients with severe disease. In patient with moderate disease data showed clinical improvement in patient treated with 5 days of remdesivir, but not those treated for 10 days.    Criteria for use include:  • SpO2 < 94% on room air, OR requiring supplemental oxygen, OR requiring invasive mechanical ventilation, OR requiring ECMO (e.g moderate to critical disease)  • eGFR > 30 mL/min or less then 30 when the benefits outweigh the risks  • ALT < 5X ULN    Contraindications  • Use during pregnancy unless the potential benefits justify the potential risk for the mother and the fetus.  • Remdesivir should not be initiated in patients with ALT greater than or equal to 5 times the upper limit of normal (ULN) of baseline.  • Use in patients with renal impairment is based on potential risk/benefit considerations.  Remdesivir Dosing  • Adult patients greater than or equal to 40 k mG IV x 1 dose on day 1, followed by 100 mG IV q24h.  • Administration of Remdesivir in patients with eGFR less than 30 mL/min should be considered if the potential benefits outweigh the potential risks. There is a theoretical riskof accumulation of cyclodextrin excipient found in Remdesivir.  Treatment Duration  • Not requiring mechanical ventilation or ECMO, duration is 5 days of treatment.       If patient does not demonstrate clinical improvement, treatment may be extended for a total of 10 days if clinically indicated.  • Patients do not need to complete the course if they are stable for discharge.  Monitoring Parameters  • Daily renal and hepatic monitoring should be performed while on therapy       Discontinue therapy if patient is asymptomatic with ALT greater than or equal to 5 times the ULN, restart once ALT less than 5 times the ULN.       Discontinue therapy if ALT elevation is accompanied by signs or symptoms of liver inflammation or increasing conjugated bilirubin, alkaline phosphatase, or INR.  • Pregnancy Test  • Infusion-related reactions have been reported       Discontinue infusion and administer appropriate treatment.    Recommendation:  #COVID  Monitor clinically.  Monitor Oxygenation  O2 supplementation.  Continue Remdesivir and watch renal function closely - up to 5 days depending on clinical course.  Monitor CBC, CMP daily  Ferritin, CRP, and D dimer q 48 hrs.  IV Dexamethasone 6mg q24hrs x10 days if hypoxia.  Anticoagulation per protocol.  Treatment options are limited-this as well as limitations of data discussed with pt.  Monitor for any bacterial superinfection/complications.  This tx plan was formulated utilizing my clinical judgement, currently available local/regional anecdotal information, organizational treatment recommendations with COVID-19 specific considerations given rapidly changing information available.     #Uncontrolled DM with hyperglycemic   having very high glucose levels likely in the setting of steroids  consider endocrine consult  Would put him on standing basal bolus insulin or at the very least  long acting insulin     Will follow.     Macy Aguayo MD  Division of infectious Diseases  Cell [phone] between 8am and 6pm  After 6pm and over the weekends please call ID service line at [phone].

## 2021-08-17 NOTE — PROGRESS NOTE ADULT - SUBJECTIVE AND OBJECTIVE BOX
CAPILLARY BLOOD GLUCOSE      POCT Blood Glucose.: 206 mg/dL (17 Aug 2021 00:19)  POCT Blood Glucose.: 269 mg/dL (16 Aug 2021 20:23)  POCT Blood Glucose.: 281 mg/dL (16 Aug 2021 17:07)  POCT Blood Glucose.: 317 mg/dL (16 Aug 2021 12:11)  POCT Blood Glucose.: 329 mg/dL (16 Aug 2021 08:26)      Vital Signs Last 24 Hrs  T(C): 36.6 (17 Aug 2021 05:43), Max: 36.9 (16 Aug 2021 22:10)  T(F): 97.8 (17 Aug 2021 05:43), Max: 98.4 (16 Aug 2021 22:10)  HR: 53 (17 Aug 2021 05:43) (53 - 59)  BP: 113/72 (17 Aug 2021 05:43) (94/59 - 113/72)  BP(mean): --  RR: 18 (17 Aug 2021 05:43) (18 - 18)  SpO2: 92% (17 Aug 2021 05:43) (92% - 97%)       08-16    136  |  102  |  70<H>  ----------------------------<  322<H>  4.9   |  25  |  1.90<H>    Ca    8.9      16 Aug 2021 09:16  Phos  3.9     08-16  Mg     3.3     08-16    TPro  7.1  /  Alb  2.4<L>  /  TBili  0.9  /  DBili  .20  /  AST  21  /  ALT  21  /  AlkPhos  49  08-16      dexAMETHasone     Tablet 6 milliGRAM(s) Oral daily  dextrose 40% Gel 15 Gram(s) Oral once  dextrose 50% Injectable 25 Gram(s) IV Push once  dextrose 50% Injectable 12.5 Gram(s) IV Push once  dextrose 50% Injectable 25 Gram(s) IV Push once  glucagon  Injectable 1 milliGRAM(s) IntraMuscular once  insulin glargine Injectable (LANTUS) 25 Unit(s) SubCutaneous every morning  insulin glargine Injectable (LANTUS) 25 Unit(s) SubCutaneous at bedtime  insulin lispro (ADMELOG) corrective regimen sliding scale   SubCutaneous three times a day before meals  insulin lispro Injectable (ADMELOG) 12 Unit(s) SubCutaneous three times a day before meals

## 2021-08-17 NOTE — PROGRESS NOTE ADULT - TIME BILLING
Chart review, examination, documentation, care coordination and counseling.  son, mr garcia, updated on 8/17. asked questions about resp status, glucose control, renal function, how many days of remdesivir. all questions answered.

## 2021-08-18 LAB
ALBUMIN SERPL ELPH-MCNC: 2.5 G/DL — LOW (ref 3.3–5)
ALBUMIN SERPL ELPH-MCNC: 2.6 G/DL — LOW (ref 3.3–5)
ALP SERPL-CCNC: 52 U/L — SIGNIFICANT CHANGE UP (ref 40–120)
ALP SERPL-CCNC: 52 U/L — SIGNIFICANT CHANGE UP (ref 40–120)
ALT FLD-CCNC: 20 U/L — SIGNIFICANT CHANGE UP (ref 12–78)
ALT FLD-CCNC: 20 U/L — SIGNIFICANT CHANGE UP (ref 12–78)
ANION GAP SERPL CALC-SCNC: 7 MMOL/L — SIGNIFICANT CHANGE UP (ref 5–17)
AST SERPL-CCNC: 29 U/L — SIGNIFICANT CHANGE UP (ref 15–37)
AST SERPL-CCNC: 29 U/L — SIGNIFICANT CHANGE UP (ref 15–37)
BILIRUB DIRECT SERPL-MCNC: 0.3 MG/DL — HIGH (ref 0.05–0.2)
BILIRUB INDIRECT FLD-MCNC: 1.2 MG/DL — HIGH (ref 0.2–1)
BILIRUB SERPL-MCNC: 1.5 MG/DL — HIGH (ref 0.2–1.2)
BILIRUB SERPL-MCNC: 1.5 MG/DL — HIGH (ref 0.2–1.2)
BUN SERPL-MCNC: 46 MG/DL — HIGH (ref 7–23)
CALCIUM SERPL-MCNC: 9 MG/DL — SIGNIFICANT CHANGE UP (ref 8.5–10.1)
CHLORIDE SERPL-SCNC: 109 MMOL/L — HIGH (ref 96–108)
CO2 SERPL-SCNC: 24 MMOL/L — SIGNIFICANT CHANGE UP (ref 22–31)
CREAT SERPL-MCNC: 1.6 MG/DL — HIGH (ref 0.5–1.3)
CRP SERPL-MCNC: 36 MG/L — HIGH
D DIMER BLD IA.RAPID-MCNC: 747 NG/ML DDU — HIGH
FERRITIN SERPL-MCNC: 971 NG/ML — HIGH (ref 30–400)
GLUCOSE SERPL-MCNC: 120 MG/DL — HIGH (ref 70–99)
HCT VFR BLD CALC: 37.4 % — LOW (ref 39–50)
HGB BLD-MCNC: 13.1 G/DL — SIGNIFICANT CHANGE UP (ref 13–17)
INR BLD: 1.16 RATIO — SIGNIFICANT CHANGE UP (ref 0.88–1.16)
LDH SERPL L TO P-CCNC: 444 U/L — HIGH (ref 50–242)
MAGNESIUM SERPL-MCNC: 3 MG/DL — HIGH (ref 1.6–2.6)
MCHC RBC-ENTMCNC: 28.7 PG — SIGNIFICANT CHANGE UP (ref 27–34)
MCHC RBC-ENTMCNC: 35 GM/DL — SIGNIFICANT CHANGE UP (ref 32–36)
MCV RBC AUTO: 81.8 FL — SIGNIFICANT CHANGE UP (ref 80–100)
NRBC # BLD: 0 /100 WBCS — SIGNIFICANT CHANGE UP (ref 0–0)
PHOSPHATE SERPL-MCNC: 2.8 MG/DL — SIGNIFICANT CHANGE UP (ref 2.5–4.5)
PLATELET # BLD AUTO: 256 K/UL — SIGNIFICANT CHANGE UP (ref 150–400)
POTASSIUM SERPL-MCNC: 4.6 MMOL/L — SIGNIFICANT CHANGE UP (ref 3.5–5.3)
POTASSIUM SERPL-SCNC: 4.6 MMOL/L — SIGNIFICANT CHANGE UP (ref 3.5–5.3)
PROCALCITONIN SERPL-MCNC: 0.09 NG/ML — HIGH (ref 0–0.04)
PROT SERPL-MCNC: 7.1 G/DL — SIGNIFICANT CHANGE UP (ref 6–8.3)
PROT SERPL-MCNC: 7.1 G/DL — SIGNIFICANT CHANGE UP (ref 6–8.3)
PROTHROM AB SERPL-ACNC: 13.5 SEC — SIGNIFICANT CHANGE UP (ref 10.6–13.6)
RBC # BLD: 4.57 M/UL — SIGNIFICANT CHANGE UP (ref 4.2–5.8)
RBC # FLD: 13.8 % — SIGNIFICANT CHANGE UP (ref 10.3–14.5)
SODIUM SERPL-SCNC: 140 MMOL/L — SIGNIFICANT CHANGE UP (ref 135–145)
WBC # BLD: 8.07 K/UL — SIGNIFICANT CHANGE UP (ref 3.8–10.5)
WBC # FLD AUTO: 8.07 K/UL — SIGNIFICANT CHANGE UP (ref 3.8–10.5)

## 2021-08-18 PROCEDURE — 99232 SBSQ HOSP IP/OBS MODERATE 35: CPT

## 2021-08-18 PROCEDURE — 99232 SBSQ HOSP IP/OBS MODERATE 35: CPT | Mod: GC

## 2021-08-18 RX ADMIN — INSULIN GLARGINE 25 UNIT(S): 100 INJECTION, SOLUTION SUBCUTANEOUS at 09:21

## 2021-08-18 RX ADMIN — HEPARIN SODIUM 5000 UNIT(S): 5000 INJECTION INTRAVENOUS; SUBCUTANEOUS at 13:40

## 2021-08-18 RX ADMIN — REMDESIVIR 500 MILLIGRAM(S): 5 INJECTION INTRAVENOUS at 23:21

## 2021-08-18 RX ADMIN — Medication 12 UNIT(S): at 17:38

## 2021-08-18 RX ADMIN — REMDESIVIR 500 MILLIGRAM(S): 5 INJECTION INTRAVENOUS at 00:22

## 2021-08-18 RX ADMIN — Medication 6 MILLIGRAM(S): at 06:42

## 2021-08-18 RX ADMIN — Medication 12 UNIT(S): at 09:17

## 2021-08-18 RX ADMIN — HEPARIN SODIUM 5000 UNIT(S): 5000 INJECTION INTRAVENOUS; SUBCUTANEOUS at 21:16

## 2021-08-18 RX ADMIN — HEPARIN SODIUM 5000 UNIT(S): 5000 INJECTION INTRAVENOUS; SUBCUTANEOUS at 06:42

## 2021-08-18 RX ADMIN — Medication 12 UNIT(S): at 13:09

## 2021-08-18 NOTE — CHART NOTE - NSCHARTNOTEFT_GEN_A_CORE
RN called to report glucose FS of 88. Patient due to receive lantus 25 units, advised patient hold lantus in the setting of FS < 100. Will continue to monitor, RN to call for any changes.

## 2021-08-18 NOTE — PROGRESS NOTE ADULT - SUBJECTIVE AND OBJECTIVE BOX
bronson is a 61y old  Male who presents with a chief complaint of acute hypoxic respiratory failure (13 Aug 2021 19:56)       HPI:  62 yo Male patient with PMHx of HTN, DM type 2 (insulin dependent) presents today complaining SOB and generalized weakness since 08/11/2021. Pt endorses that came back from [place] on August 5th and tested positive for COVID-19, states he was in contact with a friend that tested positive. Since 08/11/2021 has been progressively worsening  generalized weakness and exertional dyspnea, associated with cough, decreased appetite with poor oral intake, decreases smell and one episode of vomiting today. Patient before was able to walk long distances, climb stairs w/o dyspna  At this time he denies  fever, chills, chest pain, palpitations, abdominal pain, diarrhea, constipation, urinary frequency, urgency, hematuria, hematochezia, melena or dysuria, changes in vision, dizziness, numbness, tingling. He is not vaccinated against covid19  Renal is being consulted for SORAIDA/CKD. No urinary c/o.     SOB stable. NO N/V.    PAST MEDICAL & SURGICAL HISTORY:  Hypertension    Diabetes mellitus    No significant past surgical history         FAMILY HISTORY:  FH: HTN (hypertension) (Father, Mother)    NC    Social History:Non smoker    MEDICATIONS  (STANDING):  dexAMETHasone     Tablet 6 milliGRAM(s) Oral daily  dextrose 40% Gel 15 Gram(s) Oral once  dextrose 5%. 1000 milliLiter(s) (50 mL/Hr) IV Continuous <Continuous>  dextrose 5%. 1000 milliLiter(s) (100 mL/Hr) IV Continuous <Continuous>  dextrose 50% Injectable 25 Gram(s) IV Push once  dextrose 50% Injectable 12.5 Gram(s) IV Push once  dextrose 50% Injectable 25 Gram(s) IV Push once  glucagon  Injectable 1 milliGRAM(s) IntraMuscular once  heparin   Injectable 5000 Unit(s) SubCutaneous every 8 hours  insulin glargine Injectable (LANTUS) 25 Unit(s) SubCutaneous every morning  insulin glargine Injectable (LANTUS) 25 Unit(s) SubCutaneous at bedtime  insulin lispro (ADMELOG) corrective regimen sliding scale   SubCutaneous three times a day before meals  insulin lispro Injectable (ADMELOG) 12 Unit(s) SubCutaneous three times a day before meals  remdesivir  IVPB   IV Intermittent   remdesivir  IVPB 100 milliGRAM(s) IV Intermittent every 24 hours    MEDICATIONS  (PRN):  acetaminophen   Tablet .. 650 milliGRAM(s) Oral every 6 hours PRN Temp greater or equal to 38.5C (101.3F), Mild Pain (1 - 3)  ondansetron Injectable 4 milliGRAM(s) IV Push every 8 hours PRN Nausea and/or Vomiting      Allergies    calcium channel blockers (Other)    Intolerances       REVIEW OF SYSTEMS:    CONSTITUTIONAL:  No weight loss   EYES: No eye pain, visual disturbances, or discharge  ENMT:  No difficulty hearing, tinnitus, vertigo; No sinus or throat pain  NECK: No pain or stiffness  RESPIRATORY: no STEVNES; No wheeze.  CARDIOVASCULAR: No chest pain, palpitations, dizziness,   GASTROINTESTINAL: No abdominal or epigastric pain. No nausea, vomiting, or hematemesis; No diarrhea or constipation. No melena or hematochezia.  GENITOURINARY: No dysuria, frequency, hematuria, or incontinence  NEUROLOGICAL: No headaches, memory loss, loss of strength, numbness, or tremors  LYMPH NODES: No enlarged glands  MUSCULOSKELETAL: No joint pain or swelling       I&O's Summary    16 Aug 2021 07:01  -  17 Aug 2021 07:00  --------------------------------------------------------  IN: 990 mL / OUT: 0 mL / NET: 990 mL    17 Aug 2021 07:01  -  17 Aug 2021 11:50  --------------------------------------------------------  IN: 0 mL / OUT: 400 mL / NET: -400 mL      Vital Signs Last 24 Hrs  T(C): 37.4 (18 Aug 2021 13:06), Max: 37.4 (18 Aug 2021 13:06)  T(F): 99.3 (18 Aug 2021 13:06), Max: 99.3 (18 Aug 2021 13:06)  HR: 65 (18 Aug 2021 13:06) (56 - 69)  BP: 101/65 (18 Aug 2021 13:06) (101/65 - 128/72)  BP(mean): --  RR: 18 (18 Aug 2021 13:06) (18 - 18)  SpO2: 92% (18 Aug 2021 13:30) (91% - 98%)    CONSTITUTIONAL: NAD, well-developed  RESPIRATORY: Normal respiratory effort; lungs are clear to auscultation bilaterally  CARDIOVASCULAR: Regular rate and rhythm, normal S1 and S2, no murmur/rub/gallop; No lower extremity edema; Peripheral pulses are 2+ bilaterally  ABDOMEN: Nontender to palpation, normoactive bowel sounds, no rebound/guarding; No hepatosplenomegaly  MUSCLOSKELETAL: no clubbing or cyanosis of digits; no joint swelling or tenderness to palpation                        13.1   8.07  )-----------( 256      ( 18 Aug 2021 11:00 )             37.4     08-18    140  |  109<H>  |  46<H>  ----------------------------<  120<H>  4.6   |  24  |  1.60<H>    Ca    9.0      18 Aug 2021 11:00  Phos  2.8     08-18  Mg     3.0     08-18    TPro  7.1  /  Alb  2.6<L>  /  TBili  1.5<H>  /  DBili  .30<H>  /  AST  29  /  ALT  20  /  AlkPhos  52  08-18    PT/INR - ( 18 Aug 2021 11:00 )   PT: 13.5 sec;   INR: 1.16 ratio               NEURO: CN 2-12 grossly intact, moves all limbs spontaneously  PSYCH: A+O to person, place, and time; affect appropriate      LABS:

## 2021-08-18 NOTE — PROGRESS NOTE ADULT - SUBJECTIVE AND OBJECTIVE BOX
Patient is a 61y old  Male who presents with a chief complaint of acute hypoxic respiratory failure (18 Aug 2021 08:34)      INTERVAL HPI/OVERNIGHT EVENTS: Patient seen and examined at bedside. No overnight events occurred. Patient has no complaints at this time. Denies fevers, chills, headache, lightheadedness, chest pain, dyspnea, abdominal pain, n/v/d/c.    MEDICATIONS  (STANDING):  dexAMETHasone     Tablet 6 milliGRAM(s) Oral daily  dextrose 40% Gel 15 Gram(s) Oral once  dextrose 5%. 1000 milliLiter(s) (50 mL/Hr) IV Continuous <Continuous>  dextrose 5%. 1000 milliLiter(s) (100 mL/Hr) IV Continuous <Continuous>  dextrose 50% Injectable 25 Gram(s) IV Push once  dextrose 50% Injectable 12.5 Gram(s) IV Push once  dextrose 50% Injectable 25 Gram(s) IV Push once  glucagon  Injectable 1 milliGRAM(s) IntraMuscular once  heparin   Injectable 5000 Unit(s) SubCutaneous every 8 hours  insulin glargine Injectable (LANTUS) 25 Unit(s) SubCutaneous every morning  insulin glargine Injectable (LANTUS) 25 Unit(s) SubCutaneous at bedtime  insulin lispro (ADMELOG) corrective regimen sliding scale   SubCutaneous three times a day before meals  insulin lispro Injectable (ADMELOG) 12 Unit(s) SubCutaneous three times a day before meals  remdesivir  IVPB 100 milliGRAM(s) IV Intermittent every 24 hours  remdesivir  IVPB   IV Intermittent     MEDICATIONS  (PRN):  acetaminophen   Tablet .. 650 milliGRAM(s) Oral every 6 hours PRN Temp greater or equal to 38.5C (101.3F), Mild Pain (1 - 3)  ondansetron Injectable 4 milliGRAM(s) IV Push every 8 hours PRN Nausea and/or Vomiting      Allergies    calcium channel blockers (Other)    Intolerances        REVIEW OF SYSTEMS:  CONSTITUTIONAL: No fever or chills  HEENT:  No headache, no sore throat  RESPIRATORY: No cough, wheezing, or shortness of breath  CARDIOVASCULAR: No chest pain, palpitations  GASTROINTESTINAL: No abd pain, nausea, vomiting, or diarrhea  GENITOURINARY: No dysuria, frequency, or hematuria  NEUROLOGICAL: no focal weakness or dizziness  MUSCULOSKELETAL: no myalgias     Vital Signs Last 24 Hrs  T(C): 37.4 (18 Aug 2021 13:06), Max: 37.4 (18 Aug 2021 13:06)  T(F): 99.3 (18 Aug 2021 13:06), Max: 99.3 (18 Aug 2021 13:06)  HR: 65 (18 Aug 2021 13:06) (56 - 69)  BP: 101/65 (18 Aug 2021 13:06) (101/65 - 128/72)  BP(mean): --  RR: 18 (18 Aug 2021 13:06) (18 - 18)  SpO2: 92% (18 Aug 2021 13:06) (91% - 98%)    PHYSICAL EXAM:  GENERAL: NAD  HEENT:  anicteric, moist mucous membranes  CHEST/LUNG:  CTA b/l, no rales, wheezes, or rhonchi  HEART:  RRR, S1, S2  ABDOMEN:  BS+, soft, nontender, nondistended  EXTREMITIES: no edema, cyanosis, or calf tenderness  NERVOUS SYSTEM: answers questions and follows commands appropriately    LABS:                        13.1   8.07  )-----------( 256      ( 18 Aug 2021 11:00 )             37.4     CBC Full  -  ( 18 Aug 2021 11:00 )  WBC Count : 8.07 K/uL  Hemoglobin : 13.1 g/dL  Hematocrit : 37.4 %  Platelet Count - Automated : 256 K/uL  Mean Cell Volume : 81.8 fl  Mean Cell Hemoglobin : 28.7 pg  Mean Cell Hemoglobin Concentration : 35.0 gm/dL  Auto Neutrophil # : x  Auto Lymphocyte # : x  Auto Monocyte # : x  Auto Eosinophil # : x  Auto Basophil # : x  Auto Neutrophil % : x  Auto Lymphocyte % : x  Auto Monocyte % : x  Auto Eosinophil % : x  Auto Basophil % : x    18 Aug 2021 11:00    140    |  109    |  46     ----------------------------<  120    4.6     |  24     |  1.60     Ca    9.0        18 Aug 2021 11:00  Phos  2.8       18 Aug 2021 11:00  Mg     3.0       18 Aug 2021 11:00    TPro  7.1    /  Alb  2.6    /  TBili  1.5    /  DBili  .30    /  AST  29     /  ALT  20     /  AlkPhos  52     18 Aug 2021 11:00    PT/INR - ( 18 Aug 2021 11:00 )   PT: 13.5 sec;   INR: 1.16 ratio             CAPILLARY BLOOD GLUCOSE      POCT Blood Glucose.: 125 mg/dL (18 Aug 2021 12:15)  POCT Blood Glucose.: 120 mg/dL (18 Aug 2021 08:49)  POCT Blood Glucose.: 167 mg/dL (17 Aug 2021 21:45)  POCT Blood Glucose.: 191 mg/dL (17 Aug 2021 17:10)        Culture - Blood (collected 08-14-21 @ 01:02)  Source: .Blood Blood  Preliminary Report (08-15-21 @ 02:01):    No growth to date.    Culture - Blood (collected 08-14-21 @ 01:02)  Source: .Blood Blood  Preliminary Report (08-15-21 @ 02:01):    No growth to date.        RADIOLOGY & ADDITIONAL TESTS:    Personally reviewed.     Consultant(s) Notes Reviewed:  [x] YES  [ ] NO     Patient is a 61y old  Male who presents with a chief complaint of acute hypoxic respiratory failure (18 Aug 2021 08:34)      INTERVAL HPI/OVERNIGHT EVENTS: Patient seen and examined at bedside. No overnight events occurred. Patient states that he feels better this morning, but admits to some dyspnea on exertion. He currently is off nasal cannula as he was washing his face but feels well and has no obvious signs of increased work of breathing. Denies fevers, chills, headache, lightheadedness, chest pain, dyspnea, abdominal pain, n/v/d/c.    MEDICATIONS  (STANDING):  dexAMETHasone     Tablet 6 milliGRAM(s) Oral daily  dextrose 40% Gel 15 Gram(s) Oral once  dextrose 5%. 1000 milliLiter(s) (50 mL/Hr) IV Continuous <Continuous>  dextrose 5%. 1000 milliLiter(s) (100 mL/Hr) IV Continuous <Continuous>  dextrose 50% Injectable 25 Gram(s) IV Push once  dextrose 50% Injectable 12.5 Gram(s) IV Push once  dextrose 50% Injectable 25 Gram(s) IV Push once  glucagon  Injectable 1 milliGRAM(s) IntraMuscular once  heparin   Injectable 5000 Unit(s) SubCutaneous every 8 hours  insulin glargine Injectable (LANTUS) 25 Unit(s) SubCutaneous every morning  insulin glargine Injectable (LANTUS) 25 Unit(s) SubCutaneous at bedtime  insulin lispro (ADMELOG) corrective regimen sliding scale   SubCutaneous three times a day before meals  insulin lispro Injectable (ADMELOG) 12 Unit(s) SubCutaneous three times a day before meals  remdesivir  IVPB 100 milliGRAM(s) IV Intermittent every 24 hours  remdesivir  IVPB   IV Intermittent     MEDICATIONS  (PRN):  acetaminophen   Tablet .. 650 milliGRAM(s) Oral every 6 hours PRN Temp greater or equal to 38.5C (101.3F), Mild Pain (1 - 3)  ondansetron Injectable 4 milliGRAM(s) IV Push every 8 hours PRN Nausea and/or Vomiting      Allergies    calcium channel blockers (Other)    Intolerances        REVIEW OF SYSTEMS:  CONSTITUTIONAL: No fever or chills  HEENT:  No headache, no sore throat  RESPIRATORY: Admits shortness of breath and cough, no wheezing  CARDIOVASCULAR: No chest pain, palpitations  GASTROINTESTINAL: No abd pain, nausea, vomiting, or diarrhea  GENITOURINARY: No dysuria, frequency, or hematuria  NEUROLOGICAL: no focal weakness or dizziness  MUSCULOSKELETAL: no myalgias     Vital Signs Last 24 Hrs  T(C): 37.4 (18 Aug 2021 13:06), Max: 37.4 (18 Aug 2021 13:06)  T(F): 99.3 (18 Aug 2021 13:06), Max: 99.3 (18 Aug 2021 13:06)  HR: 65 (18 Aug 2021 13:06) (56 - 69)  BP: 101/65 (18 Aug 2021 13:06) (101/65 - 128/72)  BP(mean): --  RR: 18 (18 Aug 2021 13:06) (18 - 18)  SpO2: 92% (18 Aug 2021 13:06) (91% - 98%)    PHYSICAL EXAM:  GENERAL: NAD  HEENT:  anicteric, moist mucous membranes  CHEST/LUNG: crackles at bilateral lung bases  HEART:  RRR, S1, S2  ABDOMEN:  BS+, soft, nontender, nondistended  EXTREMITIES: no edema, cyanosis, or calf tenderness  NERVOUS SYSTEM: answers questions and follows commands appropriately    LABS:                        13.1   8.07  )-----------( 256      ( 18 Aug 2021 11:00 )             37.4     CBC Full  -  ( 18 Aug 2021 11:00 )  WBC Count : 8.07 K/uL  Hemoglobin : 13.1 g/dL  Hematocrit : 37.4 %  Platelet Count - Automated : 256 K/uL  Mean Cell Volume : 81.8 fl  Mean Cell Hemoglobin : 28.7 pg  Mean Cell Hemoglobin Concentration : 35.0 gm/dL  Auto Neutrophil # : x  Auto Lymphocyte # : x  Auto Monocyte # : x  Auto Eosinophil # : x  Auto Basophil # : x  Auto Neutrophil % : x  Auto Lymphocyte % : x  Auto Monocyte % : x  Auto Eosinophil % : x  Auto Basophil % : x    18 Aug 2021 11:00    140    |  109    |  46     ----------------------------<  120    4.6     |  24     |  1.60     Ca    9.0        18 Aug 2021 11:00  Phos  2.8       18 Aug 2021 11:00  Mg     3.0       18 Aug 2021 11:00    TPro  7.1    /  Alb  2.6    /  TBili  1.5    /  DBili  .30    /  AST  29     /  ALT  20     /  AlkPhos  52     18 Aug 2021 11:00    PT/INR - ( 18 Aug 2021 11:00 )   PT: 13.5 sec;   INR: 1.16 ratio             CAPILLARY BLOOD GLUCOSE      POCT Blood Glucose.: 125 mg/dL (18 Aug 2021 12:15)  POCT Blood Glucose.: 120 mg/dL (18 Aug 2021 08:49)  POCT Blood Glucose.: 167 mg/dL (17 Aug 2021 21:45)  POCT Blood Glucose.: 191 mg/dL (17 Aug 2021 17:10)        Culture - Blood (collected 08-14-21 @ 01:02)  Source: .Blood Blood  Preliminary Report (08-15-21 @ 02:01):    No growth to date.    Culture - Blood (collected 08-14-21 @ 01:02)  Source: .Blood Blood  Preliminary Report (08-15-21 @ 02:01):    No growth to date.        RADIOLOGY & ADDITIONAL TESTS:   no new imaging    Personally reviewed.     Consultant(s) Notes Reviewed:  [x] YES  [ ] NO

## 2021-08-18 NOTE — PROGRESS NOTE ADULT - ASSESSMENT
SORAIDA/CKD  COVID19 PNA  Acute respiratory failure  DM  HTN      -Cr 1.35 on 2017 suggest CKDIII baseline. Cr stabilized; will monitor for now.  -SORAIDA clinically pre-renal. Urine studies suggest pre-renal etiology. However, cannot r/o COVID direct renal injury though less likely.  -Avoid excessive IVF with COVID pulmonary status  -Renal US if Cr begins to worsen  -No indication for RRT at this time

## 2021-08-18 NOTE — PROGRESS NOTE ADULT - SUBJECTIVE AND OBJECTIVE BOX
[facility]  INFECTIOUS DISEASES   [address] [place] [address]  Tel: [phone]     Fax: [phone]  =======================================================    MARIA LUZ ROCK [id number]    Follow up: COVID19    No complaint, minimal cough, no SOB, Seen this morning off O2 doing well.   No fever. No GI symptoms.   Renal function improving.     Allergies:  calcium channel blockers (Other)    Antibiotics:  dexAMETHasone     Tablet 6 milliGRAM(s) Oral daily  remdesivir  IVPB   IV Intermittent        REVIEW OF SYSTEMS:  CONSTITUTIONAL:  No Fever or chills  HEENT:   No diplopia or blurred vision.  No earache, sore throat or runny nose.  CARDIOVASCULAR:  No chest pain or SOB  RESPIRATORY:  No cough, shortness of breath, PND or orthopnea.  GASTROINTESTINAL:  No nausea, vomiting or diarrhea.  GENITOURINARY:  No dysuria, frequency or urgency. No Blood in urine  MUSCULOSKELETAL:  no joint aches, no muscle pain  SKIN:  No change in skin, hair or nails.  NEUROLOGIC:  No paresthesias or weakness.  PSYCHIATRIC:  No disorder of thought or mood.  ENDOCRINE:  No heat or cold intolerance, polyuria or polydipsia.  HEMATOLOGICAL:  No easy bruising or bleeding.      Physical Exam:  Vital Signs Last 24 Hrs  T(C): 37.4 (18 Aug 2021 13:06), Max: 37.4 (18 Aug 2021 13:06)  T(F): 99.3 (18 Aug 2021 13:06), Max: 99.3 (18 Aug 2021 13:06)  HR: 65 (18 Aug 2021 13:06) (57 - 69)  BP: 101/65 (18 Aug 2021 13:06) (101/65 - 113/80)  BP(mean): --  RR: 18 (18 Aug 2021 13:06) (18 - 18)  SpO2: 92% (18 Aug 2021 13:30) (91% - 98%)  GEN: NAD  HEENT: normocephalic and atraumatic. EOMI. RUKHSANA.    NECK: Supple.  No lymphadenopathy   LUNGS: Clear to auscultation.  HEART: Regular rate and rhythm without murmur.  ABDOMEN: Soft, nontender, and nondistended.  Positive bowel sounds.    : No CVA tenderness  EXTREMITIES: Without any cyanosis, clubbing, rash, lesions or edema.  MSK: no joint swelling  NEUROLOGIC: grossly intact.  PSYCHIATRIC: Appropriate affect .  SKIN: No ulceration or induration present.      Labs:                        13.1   8.07  )-----------( 256      ( 18 Aug 2021 11:00 )             37.4         140  |  109<H>  |  46<H>  ----------------------------<  120<H>  4.6   |  24  |  1.60<H>    Ca    9.0      18 Aug 2021 11:00  Phos  2.8       Mg     3.0         TPro  7.1  /  Alb  2.6<L>  /  TBili  1.5<H>  /  DBili  .30<H>  /  AST  29  /  ALT  20  /  AlkPhos  52        Culture - Blood (collected 21 @ 01:02)  Source: .Blood Blood    Culture - Blood (collected 21 @ 01:02)  Source: .Blood Blood    WBC Count: 8.07 K/uL (21 @ 11:00)  WBC Count: 10.46 K/uL (21 @ 09:22)  WBC Count: 7.54 K/uL (21 @ 09:16)  WBC Count: 6.91 K/uL (08-15-21 @ 10:21)  WBC Count: 5.86 K/uL (08-15-21 @ 09:11)  WBC Count: 5.87 K/uL (21 @ 06:09)  WBC Count: 5.31 K/uL (21 @ 18:13)    Creatinine, Serum: 1.60 mg/dL (21 @ 11:00)  Creatinine, Serum: 1.60 mg/dL (21 @ 09:13)  Creatinine, Serum: 1.90 mg/dL (21 @ 09:16)  Creatinine, Serum: 2.00 mg/dL (08-15-21 @ 18:40)  Creatinine, Serum: 2.00 mg/dL (08-15-21 @ 09:11)  Creatinine, Serum: 2.20 mg/dL (21 @ 06:09)  Creatinine, Serum: 2.80 mg/dL (21 @ 18:13)    C-Reactive Protein, Serum: 72 mg/L (21 @ 02:26)    Ferritin, Serum: 1084 ng/mL (21 @ 01:40)    Procalcitonin, Serum: 0.09 ng/mL (21 @ 11:00)  Procalcitonin, Serum: 0.41 ng/mL (21 @ 18:32)     COVID-19 PCR: Detected (21 @ 18:13)    All imaging and data are reviewed.   < from: Xray Chest 1 View-PORTABLE IMMEDIATE (21 @ 18:28) >  EXAM:  XR CHEST PORTABLE IMMED 1V                        PROCEDURE DATE:  2021    INTERPRETATION:  Short of breath, Covid positive  AP chest. Prior 5/15/2017.  Heart magnified by AP film shallow inspiration. Patchy left basilar retrocardiac atelectasis/infiltrate. Correlate for infection. No pleural effusion. Calcific bursitis left shoulder.  IMPRESSION: Patchy left basilar retrocardiac atelectasis/infiltrate.    Assessment and Plan:   61M with COVID and uncontrolled DM2   CXR (I personally reviewed) with left lung opacity     Remdesivir has not been shown to impact mortality. Thus far it has been shown to accomplish is decrease the length of hospitalization in patients with severe disease. In patient with moderate disease data showed clinical improvement in patient treated with 5 days of remdesivir, but not those treated for 10 days.    Criteria for use include:  • SpO2 < 94% on room air, OR requiring supplemental oxygen, OR requiring invasive mechanical ventilation, OR requiring ECMO (e.g moderate to critical disease)  • eGFR > 30 mL/min or less then 30 when the benefits outweigh the risks  • ALT < 5X ULN    Contraindications  • Use during pregnancy unless the potential benefits justify the potential risk for the mother and the fetus.  • Remdesivir should not be initiated in patients with ALT greater than or equal to 5 times the upper limit of normal (ULN) of baseline.  • Use in patients with renal impairment is based on potential risk/benefit considerations.  Remdesivir Dosing  • Adult patients greater than or equal to 40 k mG IV x 1 dose on day 1, followed by 100 mG IV q24h.  • Administration of Remdesivir in patients with eGFR less than 30 mL/min should be considered if the potential benefits outweigh the potential risks. There is a theoretical riskof accumulation of cyclodextrin excipient found in Remdesivir.  Treatment Duration  • Not requiring mechanical ventilation or ECMO, duration is 5 days of treatment.       If patient does not demonstrate clinical improvement, treatment may be extended for a total of 10 days if clinically indicated.  • Patients do not need to complete the course if they are stable for discharge.  Monitoring Parameters  • Daily renal and hepatic monitoring should be performed while on therapy       Discontinue therapy if patient is asymptomatic with ALT greater than or equal to 5 times the ULN, restart once ALT less than 5 times the ULN.       Discontinue therapy if ALT elevation is accompanied by signs or symptoms of liver inflammation or increasing conjugated bilirubin, alkaline phosphatase, or INR.  • Pregnancy Test  • Infusion-related reactions have been reported       Discontinue infusion and administer appropriate treatment.    Recommendation:  #COVID  Monitor clinically.  Monitor Oxygenation  O2 supplementation.  Continue Remdesivir and watch renal function closely - up to 5 days depending on clinical course. today is day 3/5, can stop it if ready for discharge   Monitor CBC, CMP daily  Ferritin, CRP, and D dimer q 48 hrs.  IV Dexamethasone 6mg q24hrs x10 days if hypoxia.  Anticoagulation per protocol.  Treatment options are limited-this as well as limitations of data discussed with pt.  Monitor for any bacterial superinfection/complications.  This tx plan was formulated utilizing my clinical judgement, currently available local/regional anecdotal information, organizational treatment recommendations with COVID-19 specific considerations given rapidly changing information available.     #Uncontrolled DM with hyperglycemic   having very high glucose levels likely in the setting of steroids  consider endocrine consult  Would put him on standing basal bolus insulin or at the very least  long acting insulin     Will follow PRN.      Macy Aguayo MD  Division of infectious Diseases  Cell [phone] between 8am and 6pm  After 6pm and over the weekends please call ID service line at [phone].      [facility]  INFECTIOUS DISEASES   [address] [place] [address]  Tel: [phone]     Fax: [phone]  =======================================================    MARIA LUZ ROCK [id number]    Follow up: COVID19    No complaint, minimal cough, no SOB, Seen this morning off O2 doing well.   No fever. No GI symptoms.   Renal function improving.     Allergies:  calcium channel blockers (Other)    Antibiotics:  dexAMETHasone     Tablet 6 milliGRAM(s) Oral daily  remdesivir  IVPB   IV Intermittent        REVIEW OF SYSTEMS:  CONSTITUTIONAL:  No Fever or chills  HEENT:   No diplopia or blurred vision.  No earache, sore throat or runny nose.  CARDIOVASCULAR:  No chest pain or SOB  RESPIRATORY:  No cough, shortness of breath, PND or orthopnea.  GASTROINTESTINAL:  No nausea, vomiting or diarrhea.  GENITOURINARY:  No dysuria, frequency or urgency. No Blood in urine  MUSCULOSKELETAL:  no joint aches, no muscle pain  SKIN:  No change in skin, hair or nails.  NEUROLOGIC:  No paresthesias or weakness.  PSYCHIATRIC:  No disorder of thought or mood.  ENDOCRINE:  No heat or cold intolerance, polyuria or polydipsia.  HEMATOLOGICAL:  No easy bruising or bleeding.      Physical Exam:  Vital Signs Last 24 Hrs  T(C): 37.4 (18 Aug 2021 13:06), Max: 37.4 (18 Aug 2021 13:06)  T(F): 99.3 (18 Aug 2021 13:06), Max: 99.3 (18 Aug 2021 13:06)  HR: 65 (18 Aug 2021 13:06) (57 - 69)  BP: 101/65 (18 Aug 2021 13:06) (101/65 - 113/80)  BP(mean): --  RR: 18 (18 Aug 2021 13:06) (18 - 18)  SpO2: 92% (18 Aug 2021 13:30) (91% - 98%)  GEN: NAD  HEENT: normocephalic and atraumatic. EOMI. RUKHSANA.    NECK: Supple.  No lymphadenopathy   LUNGS: Clear to auscultation.  HEART: Regular rate and rhythm without murmur.  ABDOMEN: Soft, nontender, and nondistended.  Positive bowel sounds.    : No CVA tenderness  EXTREMITIES: Without any cyanosis, clubbing, rash, lesions or edema.  MSK: no joint swelling  NEUROLOGIC: grossly intact.  PSYCHIATRIC: Appropriate affect .  SKIN: No ulceration or induration present.      Labs:                        13.1   8.07  )-----------( 256      ( 18 Aug 2021 11:00 )             37.4         140  |  109<H>  |  46<H>  ----------------------------<  120<H>  4.6   |  24  |  1.60<H>    Ca    9.0      18 Aug 2021 11:00  Phos  2.8       Mg     3.0         TPro  7.1  /  Alb  2.6<L>  /  TBili  1.5<H>  /  DBili  .30<H>  /  AST  29  /  ALT  20  /  AlkPhos  52        Culture - Blood (collected 21 @ 01:02)  Source: .Blood Blood    Culture - Blood (collected 21 @ 01:02)  Source: .Blood Blood    WBC Count: 8.07 K/uL (21 @ 11:00)  WBC Count: 10.46 K/uL (21 @ 09:22)  WBC Count: 7.54 K/uL (21 @ 09:16)  WBC Count: 6.91 K/uL (08-15-21 @ 10:21)  WBC Count: 5.86 K/uL (08-15-21 @ 09:11)  WBC Count: 5.87 K/uL (21 @ 06:09)  WBC Count: 5.31 K/uL (21 @ 18:13)    Creatinine, Serum: 1.60 mg/dL (21 @ 11:00)  Creatinine, Serum: 1.60 mg/dL (21 @ 09:13)  Creatinine, Serum: 1.90 mg/dL (21 @ 09:16)  Creatinine, Serum: 2.00 mg/dL (08-15-21 @ 18:40)  Creatinine, Serum: 2.00 mg/dL (08-15-21 @ 09:11)  Creatinine, Serum: 2.20 mg/dL (21 @ 06:09)  Creatinine, Serum: 2.80 mg/dL (21 @ 18:13)    C-Reactive Protein, Serum: 72 mg/L (21 @ 02:26)    Ferritin, Serum: 1084 ng/mL (21 @ 01:40)    Procalcitonin, Serum: 0.09 ng/mL (21 @ 11:00)  Procalcitonin, Serum: 0.41 ng/mL (21 @ 18:32)     COVID-19 PCR: Detected (21 @ 18:13)    All imaging and data are reviewed.   < from: Xray Chest 1 View-PORTABLE IMMEDIATE (21 @ 18:28) >  EXAM:  XR CHEST PORTABLE IMMED 1V                        PROCEDURE DATE:  2021    INTERPRETATION:  Short of breath, Covid positive  AP chest. Prior 5/15/2017.  Heart magnified by AP film shallow inspiration. Patchy left basilar retrocardiac atelectasis/infiltrate. Correlate for infection. No pleural effusion. Calcific bursitis left shoulder.  IMPRESSION: Patchy left basilar retrocardiac atelectasis/infiltrate.    Assessment and Plan:   61M with COVID and uncontrolled DM2   CXR (I personally reviewed) with left lung opacity     Remdesivir has not been shown to impact mortality. Thus far it has been shown to accomplish is decrease the length of hospitalization in patients with severe disease. In patient with moderate disease data showed clinical improvement in patient treated with 5 days of remdesivir, but not those treated for 10 days.    Criteria for use include:  • SpO2 < 94% on room air, OR requiring supplemental oxygen, OR requiring invasive mechanical ventilation, OR requiring ECMO (e.g moderate to critical disease)  • eGFR > 30 mL/min or less then 30 when the benefits outweigh the risks  • ALT < 5X ULN    Contraindications  • Use during pregnancy unless the potential benefits justify the potential risk for the mother and the fetus.  • Remdesivir should not be initiated in patients with ALT greater than or equal to 5 times the upper limit of normal (ULN) of baseline.  • Use in patients with renal impairment is based on potential risk/benefit considerations.  Remdesivir Dosing  • Adult patients greater than or equal to 40 k mG IV x 1 dose on day 1, followed by 100 mG IV q24h.  • Administration of Remdesivir in patients with eGFR less than 30 mL/min should be considered if the potential benefits outweigh the potential risks. There is a theoretical riskof accumulation of cyclodextrin excipient found in Remdesivir.  Treatment Duration  • Not requiring mechanical ventilation or ECMO, duration is 5 days of treatment.       If patient does not demonstrate clinical improvement, treatment may be extended for a total of 10 days if clinically indicated.  • Patients do not need to complete the course if they are stable for discharge.  Monitoring Parameters  • Daily renal and hepatic monitoring should be performed while on therapy       Discontinue therapy if patient is asymptomatic with ALT greater than or equal to 5 times the ULN, restart once ALT less than 5 times the ULN.       Discontinue therapy if ALT elevation is accompanied by signs or symptoms of liver inflammation or increasing conjugated bilirubin, alkaline phosphatase, or INR.  • Pregnancy Test  • Infusion-related reactions have been reported       Discontinue infusion and administer appropriate treatment.    Recommendation:  #COVID  Monitor clinically.  Monitor Oxygenation  O2 supplementation.  Continue Remdesivir and watch renal function closely - up to 5 days depending on clinical course. today is day 4/5, can stop it if ready for discharge   Monitor CBC, CMP daily  Ferritin, CRP, and D dimer q 48 hrs.  IV Dexamethasone 6mg q24hrs x10 days if hypoxia.  Anticoagulation per protocol.  Treatment options are limited-this as well as limitations of data discussed with pt.  Monitor for any bacterial superinfection/complications.  This tx plan was formulated utilizing my clinical judgement, currently available local/regional anecdotal information, organizational treatment recommendations with COVID-19 specific considerations given rapidly changing information available.     #Uncontrolled DM with hyperglycemic   having very high glucose levels likely in the setting of steroids  consider endocrine consult  Would put him on standing basal bolus insulin or at the very least  long acting insulin     Will follow PRN.      Macy Aguayo MD  Division of infectious Diseases  Cell [phone] between 8am and 6pm  After 6pm and over the weekends please call ID service line at [phone].

## 2021-08-18 NOTE — PROGRESS NOTE ADULT - ASSESSMENT
62 yo male with PMHs of HTN and T2DM (insulin dependent) presented to the ED complaining SOB and generalized weakness since 08/11/2021. Tested positive for COVID-19 on 08/05/2021. Patient admitted for acute respiratory failure secondary to COVID-19 PNA.    # Acute respiratory failure secondary to COVID-19 PNA  - Improving  - On nasal canula 2L NC, maintain sats >92%, cont pulse ox monitoring  - Will obtain oxygen saturation on room air while ambulating to determine need for supplemental O2 on discharge  - Continue Decadron 6mg day 5/10 & Remdesivir; day 4/5   - Tylenol prn myalgias, fever  - AC with heparin 5000 sq q8   - monitor inflammatory markers  - Infectious disease Dr Aguayo following, recs appreciated    # SORAIDA on CKD, probably prerenal in the setting of dehydration   - Improving renal function with hydration, Creat 2.8 --> 2.2 -->2.0 -->1.9  -> 1.6 --> 1.6  - Per chart review, prior creatinine 1.3 in 2017, likely CKDIII   - Nephro (Dr. Vaughan) following, recs appreciated      # DM type 2, uncontrolled  - Uncontrolled blood glucose in setting of steroid use and A1c of 10.6%  - Continue insulin regimen- lantus 25units BID, Admelog 12units TID before meals, ISS  - Endocrine (Dr Craig Perlman) following, recs appreciated  - Accu checks w/ hypoglycemic protocol    Thrombocytopenia, resolved  - Likely reactive in the setting of COVID-19 infection    # Hypertension, chronic  - Previously hypotensive during hospital stay  - BPs soft this morning -110s today  - Holding home Edarbyclor 40-12.5 mg qd  (azilsartan/chlorthalidone) due to SORAIDA and hypotension    # DVT Prophylaxis   - Heparin 5000 SQ q8

## 2021-08-18 NOTE — PROGRESS NOTE ADULT - SUBJECTIVE AND OBJECTIVE BOX
CAPILLARY BLOOD GLUCOSE      POCT Blood Glucose.: 167 mg/dL (17 Aug 2021 21:45)  POCT Blood Glucose.: 191 mg/dL (17 Aug 2021 17:10)  POCT Blood Glucose.: 212 mg/dL (17 Aug 2021 12:22)      Vital Signs Last 24 Hrs  T(C): 37.3 (18 Aug 2021 04:58), Max: 37.3 (18 Aug 2021 04:58)  T(F): 99.2 (18 Aug 2021 04:58), Max: 99.2 (18 Aug 2021 04:58)  HR: 69 (18 Aug 2021 04:58) (56 - 69)  BP: 102/73 (18 Aug 2021 04:58) (102/73 - 128/72)  BP(mean): --  RR: 18 (18 Aug 2021 04:58) (18 - 18)  SpO2: 91% (18 Aug 2021 04:58) (91% - 98%)       08-17    137  |  107  |  65<H>  ----------------------------<  178<H>  4.3   |  23  |  1.60<H>    Ca    8.6      17 Aug 2021 09:13  Phos  3.3     08-17  Mg     3.1     08-17    TPro  6.8  /  Alb  2.3<L>  /  TBili  1.3<H>  /  DBili  .20  /  AST  22  /  ALT  18  /  AlkPhos  50  08-17      dexAMETHasone     Tablet 6 milliGRAM(s) Oral daily  dextrose 40% Gel 15 Gram(s) Oral once  dextrose 50% Injectable 25 Gram(s) IV Push once  dextrose 50% Injectable 12.5 Gram(s) IV Push once  dextrose 50% Injectable 25 Gram(s) IV Push once  glucagon  Injectable 1 milliGRAM(s) IntraMuscular once  insulin glargine Injectable (LANTUS) 25 Unit(s) SubCutaneous every morning  insulin glargine Injectable (LANTUS) 25 Unit(s) SubCutaneous at bedtime  insulin lispro (ADMELOG) corrective regimen sliding scale   SubCutaneous three times a day before meals  insulin lispro Injectable (ADMELOG) 12 Unit(s) SubCutaneous three times a day before meals

## 2021-08-19 LAB
ALBUMIN SERPL ELPH-MCNC: 2.4 G/DL — LOW (ref 3.3–5)
ALBUMIN SERPL ELPH-MCNC: 2.5 G/DL — LOW (ref 3.3–5)
ALP SERPL-CCNC: 48 U/L — SIGNIFICANT CHANGE UP (ref 40–120)
ALP SERPL-CCNC: 51 U/L — SIGNIFICANT CHANGE UP (ref 40–120)
ALT FLD-CCNC: 22 U/L — SIGNIFICANT CHANGE UP (ref 12–78)
ALT FLD-CCNC: 25 U/L — SIGNIFICANT CHANGE UP (ref 12–78)
ANION GAP SERPL CALC-SCNC: 9 MMOL/L — SIGNIFICANT CHANGE UP (ref 5–17)
AST SERPL-CCNC: 27 U/L — SIGNIFICANT CHANGE UP (ref 15–37)
AST SERPL-CCNC: 30 U/L — SIGNIFICANT CHANGE UP (ref 15–37)
BASOPHILS # BLD AUTO: 0 K/UL — SIGNIFICANT CHANGE UP (ref 0–0.2)
BASOPHILS NFR BLD AUTO: 0 % — SIGNIFICANT CHANGE UP (ref 0–2)
BILIRUB DIRECT SERPL-MCNC: 0.4 MG/DL — SIGNIFICANT CHANGE UP (ref 0.05–0.2)
BILIRUB INDIRECT FLD-MCNC: 1.3 MG/DL — SIGNIFICANT CHANGE UP (ref 0.2–1)
BILIRUB SERPL-MCNC: 1.7 MG/DL — HIGH (ref 0.2–1.2)
BILIRUB SERPL-MCNC: 1.8 MG/DL — HIGH (ref 0.2–1.2)
BUN SERPL-MCNC: 38 MG/DL — HIGH (ref 7–23)
CALCIUM SERPL-MCNC: 9 MG/DL — SIGNIFICANT CHANGE UP (ref 8.5–10.1)
CHLORIDE SERPL-SCNC: 109 MMOL/L — HIGH (ref 96–108)
CO2 SERPL-SCNC: 22 MMOL/L — SIGNIFICANT CHANGE UP (ref 22–31)
CREAT SERPL-MCNC: 1.7 MG/DL — HIGH (ref 0.5–1.3)
CULTURE RESULTS: SIGNIFICANT CHANGE UP
CULTURE RESULTS: SIGNIFICANT CHANGE UP
EOSINOPHIL # BLD AUTO: 0 K/UL — SIGNIFICANT CHANGE UP (ref 0–0.5)
EOSINOPHIL NFR BLD AUTO: 0 % — SIGNIFICANT CHANGE UP (ref 0–6)
GLUCOSE SERPL-MCNC: 163 MG/DL — HIGH (ref 70–99)
HCT VFR BLD CALC: 36 % — LOW (ref 39–50)
HGB BLD-MCNC: 12.8 G/DL — LOW (ref 13–17)
INR BLD: 1.18 RATIO — HIGH (ref 0.88–1.16)
LYMPHOCYTES # BLD AUTO: 0.37 K/UL — LOW (ref 1–3.3)
LYMPHOCYTES # BLD AUTO: 4 % — LOW (ref 13–44)
MCHC RBC-ENTMCNC: 29.1 PG — SIGNIFICANT CHANGE UP (ref 27–34)
MCHC RBC-ENTMCNC: 35.6 GM/DL — SIGNIFICANT CHANGE UP (ref 32–36)
MCV RBC AUTO: 81.8 FL — SIGNIFICANT CHANGE UP (ref 80–100)
MONOCYTES # BLD AUTO: 0.28 K/UL — SIGNIFICANT CHANGE UP (ref 0–0.9)
MONOCYTES NFR BLD AUTO: 3 % — SIGNIFICANT CHANGE UP (ref 2–14)
NEUTROPHILS # BLD AUTO: 8.42 K/UL — HIGH (ref 1.8–7.4)
NEUTROPHILS NFR BLD AUTO: 90 % — HIGH (ref 43–77)
NRBC # BLD: SIGNIFICANT CHANGE UP /100 WBCS (ref 0–0)
PLATELET # BLD AUTO: 272 K/UL — SIGNIFICANT CHANGE UP (ref 150–400)
POTASSIUM SERPL-MCNC: 4.9 MMOL/L — SIGNIFICANT CHANGE UP (ref 3.5–5.3)
POTASSIUM SERPL-SCNC: 4.9 MMOL/L — SIGNIFICANT CHANGE UP (ref 3.5–5.3)
PROT SERPL-MCNC: 6.9 G/DL — SIGNIFICANT CHANGE UP (ref 6–8.3)
PROT SERPL-MCNC: 7.1 G/DL — SIGNIFICANT CHANGE UP (ref 6–8.3)
PROTHROM AB SERPL-ACNC: 13.7 SEC — HIGH (ref 10.6–13.6)
RBC # BLD: 4.4 M/UL — SIGNIFICANT CHANGE UP (ref 4.2–5.8)
RBC # FLD: 13.9 % — SIGNIFICANT CHANGE UP (ref 10.3–14.5)
SODIUM SERPL-SCNC: 140 MMOL/L — SIGNIFICANT CHANGE UP (ref 135–145)
SPECIMEN SOURCE: SIGNIFICANT CHANGE UP
SPECIMEN SOURCE: SIGNIFICANT CHANGE UP
WBC # BLD: 9.25 K/UL — SIGNIFICANT CHANGE UP (ref 3.8–10.5)
WBC # FLD AUTO: 9.25 K/UL — SIGNIFICANT CHANGE UP (ref 3.8–10.5)

## 2021-08-19 PROCEDURE — 99233 SBSQ HOSP IP/OBS HIGH 50: CPT

## 2021-08-19 PROCEDURE — 99232 SBSQ HOSP IP/OBS MODERATE 35: CPT

## 2021-08-19 RX ORDER — INSULIN GLARGINE 100 [IU]/ML
6 INJECTION, SOLUTION SUBCUTANEOUS EVERY MORNING
Refills: 0 | Status: DISCONTINUED | OUTPATIENT
Start: 2021-08-19 | End: 2021-08-22

## 2021-08-19 RX ORDER — INSULIN LISPRO 100/ML
VIAL (ML) SUBCUTANEOUS AT BEDTIME
Refills: 0 | Status: DISCONTINUED | OUTPATIENT
Start: 2021-08-19 | End: 2021-08-23

## 2021-08-19 RX ORDER — INSULIN GLARGINE 100 [IU]/ML
6 INJECTION, SOLUTION SUBCUTANEOUS AT BEDTIME
Refills: 0 | Status: DISCONTINUED | OUTPATIENT
Start: 2021-08-19 | End: 2021-08-22

## 2021-08-19 RX ADMIN — Medication 6 MILLIGRAM(S): at 05:58

## 2021-08-19 RX ADMIN — HEPARIN SODIUM 5000 UNIT(S): 5000 INJECTION INTRAVENOUS; SUBCUTANEOUS at 13:42

## 2021-08-19 RX ADMIN — HEPARIN SODIUM 5000 UNIT(S): 5000 INJECTION INTRAVENOUS; SUBCUTANEOUS at 22:31

## 2021-08-19 RX ADMIN — REMDESIVIR 500 MILLIGRAM(S): 5 INJECTION INTRAVENOUS at 23:33

## 2021-08-19 RX ADMIN — Medication 4: at 11:44

## 2021-08-19 RX ADMIN — HEPARIN SODIUM 5000 UNIT(S): 5000 INJECTION INTRAVENOUS; SUBCUTANEOUS at 05:57

## 2021-08-19 RX ADMIN — INSULIN GLARGINE 6 UNIT(S): 100 INJECTION, SOLUTION SUBCUTANEOUS at 08:06

## 2021-08-19 RX ADMIN — INSULIN GLARGINE 6 UNIT(S): 100 INJECTION, SOLUTION SUBCUTANEOUS at 22:31

## 2021-08-19 RX ADMIN — Medication 8: at 16:51

## 2021-08-19 NOTE — PROGRESS NOTE ADULT - SUBJECTIVE AND OBJECTIVE BOX
[facility]  INFECTIOUS DISEASES   [address] [place] [address]  Tel: [phone]     Fax: [phone]  =======================================================    MARIA LUZ ROCK [id number]    Follow up: COVID19    No complaint, minimal cough, no SOB, Seen this morning 3L O2 doing well.   No fever. No GI symptoms.   Renal function improving.     Allergies:  calcium channel blockers (Other)    Antibiotics:  dexAMETHasone     Tablet 6 milliGRAM(s) Oral daily  remdesivir  IVPB   IV Intermittent        REVIEW OF SYSTEMS:  CONSTITUTIONAL:  No Fever or chills  HEENT:   No diplopia or blurred vision.  No earache, sore throat or runny nose.  CARDIOVASCULAR:  No chest pain or SOB  RESPIRATORY:  No cough, shortness of breath, PND or orthopnea.  GASTROINTESTINAL:  No nausea, vomiting or diarrhea.  GENITOURINARY:  No dysuria, frequency or urgency. No Blood in urine  MUSCULOSKELETAL:  no joint aches, no muscle pain  SKIN:  No change in skin, hair or nails.  NEUROLOGIC:  No paresthesias or weakness.  PSYCHIATRIC:  No disorder of thought or mood.  ENDOCRINE:  No heat or cold intolerance, polyuria or polydipsia.  HEMATOLOGICAL:  No easy bruising or bleeding.      Physical Exam:  Vital Signs Last 24 Hrs  T(C): 36.9 (19 Aug 2021 13:55), Max: 37.1 (18 Aug 2021 23:41)  T(F): 98.5 (19 Aug 2021 13:55), Max: 98.7 (18 Aug 2021 23:41)  HR: 68 (19 Aug 2021 13:55) (56 - 76)  BP: 102/65 (19 Aug 2021 13:55) (102/65 - 113/70)  BP(mean): --  RR: 18 (19 Aug 2021 13:55) (17 - 18)  SpO2: 95% (19 Aug 2021 13:55) (91% - 95%)  GEN: NAD  HEENT: normocephalic and atraumatic. EOMI. RUKHSANA.    NECK: Supple.  No lymphadenopathy   LUNGS: Clear to auscultation.  HEART: Regular rate and rhythm without murmur.  ABDOMEN: Soft, nontender, and nondistended.  Positive bowel sounds.    : No CVA tenderness  EXTREMITIES: Without any cyanosis, clubbing, rash, lesions or edema.  MSK: no joint swelling  NEUROLOGIC: grossly intact.  PSYCHIATRIC: Appropriate affect .  SKIN: No ulceration or induration present.        Labs:                        12.8   9.25  )-----------( 272      ( 19 Aug 2021 08:44 )             36.0         140  |  109<H>  |  38<H>  ----------------------------<  163<H>  4.9   |  22  |  1.70<H>    Ca    9.0      19 Aug 2021 08:44  Phos  2.8       Mg     3.0         TPro  7.1  /  Alb  2.4<L>  /  TBili  1.8<H>  /  DBili  0.4  /  AST  27  /  ALT  22  /  AlkPhos  51      Culture - Blood (collected 21 @ 01:02)  Source: .Blood Blood  Final Report (21 @ 02:00):    No Growth Final    Culture - Blood (collected 21 @ 01:02)  Source: .Blood Blood  Final Report (21 @ 02:00):    No Growth Final    WBC Count: 9.25 K/uL (21 @ 08:44)  WBC Count: 8.07 K/uL (21 @ 11:00)  WBC Count: 10.46 K/uL (21 @ 09:22)  WBC Count: 7.54 K/uL (21 @ 09:16)  WBC Count: 6.91 K/uL (08-15-21 @ 10:21)  WBC Count: 5.86 K/uL (08-15-21 @ 09:11)    Creatinine, Serum: 1.70 mg/dL (21 @ 08:44)  Creatinine, Serum: 1.60 mg/dL (21 @ 11:00)  Creatinine, Serum: 1.60 mg/dL (21 @ 09:13)  Creatinine, Serum: 1.90 mg/dL (21 @ 09:16)  Creatinine, Serum: 2.00 mg/dL (08-15-21 @ 18:40)  Creatinine, Serum: 2.00 mg/dL (08-15-21 @ 09:11)    C-Reactive Protein, Serum: 36 mg/L (21 @ 17:06)  C-Reactive Protein, Serum: 72 mg/L (21 @ 02:26)    Ferritin, Serum: 971 ng/mL (21 @ 17:02)  Ferritin, Serum: 1084 ng/mL (21 @ 01:40)    Procalcitonin, Serum: 0.09 ng/mL (21 @ 11:00)  Procalcitonin, Serum: 0.41 ng/mL (21 @ 18:32)    COVID-19 PCR: Detected (21 @ 18:13)    All imaging and data are reviewed.   < from: Xray Chest 1 View-PORTABLE IMMEDIATE (21 @ 18:28) >  EXAM:  XR CHEST PORTABLE IMMED 1V                        PROCEDURE DATE:  2021    INTERPRETATION:  Short of breath, Covid positive  AP chest. Prior 5/15/2017.  Heart magnified by AP film shallow inspiration. Patchy left basilar retrocardiac atelectasis/infiltrate. Correlate for infection. No pleural effusion. Calcific bursitis left shoulder.  IMPRESSION: Patchy left basilar retrocardiac atelectasis/infiltrate.    Assessment and Plan:   61M with COVID and uncontrolled DM2   CXR (I personally reviewed) with left lung opacity     Remdesivir has not been shown to impact mortality. Thus far it has been shown to accomplish is decrease the length of hospitalization in patients with severe disease. In patient with moderate disease data showed clinical improvement in patient treated with 5 days of remdesivir, but not those treated for 10 days.    Criteria for use include:  • SpO2 < 94% on room air, OR requiring supplemental oxygen, OR requiring invasive mechanical ventilation, OR requiring ECMO (e.g moderate to critical disease)  • eGFR > 30 mL/min or less then 30 when the benefits outweigh the risks  • ALT < 5X ULN    Contraindications  • Use during pregnancy unless the potential benefits justify the potential risk for the mother and the fetus.  • Remdesivir should not be initiated in patients with ALT greater than or equal to 5 times the upper limit of normal (ULN) of baseline.  • Use in patients with renal impairment is based on potential risk/benefit considerations.  Remdesivir Dosing  • Adult patients greater than or equal to 40 k mG IV x 1 dose on day 1, followed by 100 mG IV q24h.  • Administration of Remdesivir in patients with eGFR less than 30 mL/min should be considered if the potential benefits outweigh the potential risks. There is a theoretical riskof accumulation of cyclodextrin excipient found in Remdesivir.  Treatment Duration  • Not requiring mechanical ventilation or ECMO, duration is 5 days of treatment.       If patient does not demonstrate clinical improvement, treatment may be extended for a total of 10 days if clinically indicated.  • Patients do not need to complete the course if they are stable for discharge.  Monitoring Parameters  • Daily renal and hepatic monitoring should be performed while on therapy       Discontinue therapy if patient is asymptomatic with ALT greater than or equal to 5 times the ULN, restart once ALT less than 5 times the ULN.       Discontinue therapy if ALT elevation is accompanied by signs or symptoms of liver inflammation or increasing conjugated bilirubin, alkaline phosphatase, or INR.  • Pregnancy Test  • Infusion-related reactions have been reported       Discontinue infusion and administer appropriate treatment.    Recommendation:  #COVID  Monitor clinically.  Monitor Oxygenation  O2 supplementation.  Continue Remdesivir and watch renal function closely - up to 5 days depending on clinical course. today is day 5/5  Monitor CBC, CMP daily  Ferritin, CRP, and D dimer q 48 hrs.  IV Dexamethasone 6mg q24hrs x10 days if hypoxia.  Anticoagulation per protocol.  Treatment options are limited-this as well as limitations of data discussed with pt.  Monitor for any bacterial superinfection/complications.  This tx plan was formulated utilizing my clinical judgement, currently available local/regional anecdotal information, organizational treatment recommendations with COVID-19 specific considerations given rapidly changing information available.     #Uncontrolled DM with hyperglycemic   having very high glucose levels likely in the setting of steroids  endocrine consult    Will sign off please call with any question.     Macy Aguayo MD  Division of infectious Diseases  Cell [phone] between 8am and 6pm  After 6pm and over the weekends please call ID service line at [phone].

## 2021-08-19 NOTE — PROGRESS NOTE ADULT - SUBJECTIVE AND OBJECTIVE BOX
Name: MARIA LUZ ROCK  MRN: [id number]  LOCATION: [address]    ----  Patient is a 61y old  Male who presents with a chief complaint of acute hypoxic respiratory failure (19 Aug 2021 15:16)      FROM ADMISSION H+P:   HPI:  60 yo Male patient with PMHx of HTN, DM type 2 (insulin dependent) presents today complaining SOB and generalized weakness since 2021. Pt endorses that came back from [place] on  and tested positive for COVID-19, states he was in contact with a friend that tested positive. Since 2021 has been progressively worsening  generalized weakness and exertional dyspnea, associated with cough, decreased appetite with poor oral intake, decreases smell and one episode of vomiting today. Patient before was able to walk long distances, climb stairs w/o dyspna  At this time he denies  fever, chills, chest pain, palpitations, abdominal pain, diarrhea, constipation, urinary frequency, urgency, hematuria, hematochezia, melena or dysuria, changes in vision, dizziness, numbness, tingling. He is not vaccinated against covid19    ED course: VS  Afebrile, HR: 68, BP: 113/59, SpO2: 94%-> 98% 3 NC RR: 19  Labs significant for Neutrophils 84% D-dimer 323 Procalcitonin 0.41, Na 132 BUN/creatinine: 84/2.80, Glucose 427 GFR: 27, lactate 2.3. AB.4/30/103/22/30.0  Patient received 1 Lt NS bolus in the ED  (13 Aug 2021 19:56)      ----  INTERVAL HPI/OVERNIGHT EVENTS: Pt seen and evaluated at the bedside. No acute overnight events occurred. Pt was hypoxic overnight into low  70's while on room air so he was started back on nasal cannula and was able to maintain sats in low 90's last nihgt. Pt denies CP, palpitations. He denies chest pressure. he admits dyspnea and difficulty taking deep inspiration. He reports some intermittent wheezing and admits profound fatigue. He is otherwise doing "alright" and is in good spirits. No other acute events. No leg edema or calf pain.     ----  PAST MEDICAL & SURGICAL HISTORY:  Hypertension    Diabetes mellitus    No significant past surgical history        FAMILY HISTORY:  FH: HTN (hypertension) (Father, Mother)        Allergies    calcium channel blockers (Other)    Intolerances        ----  ANTIMICROBIALS:  remdesivir  IVPB   IV Intermittent   remdesivir  IVPB 100 milliGRAM(s) IV Intermittent every 24 hours    CARDIOVASCULAR:    GASTROINTESTINAL:    PULMONARY:      ----  REVIEW OF SYSTEMS:  comprehensive ROS as per HPI     ----  PHYSICAL EXAM:  GENERAL: patient appears comfortable, sitting up at bedside, nontoxic  ENMT: oropharynx clear without erythema, moist mucous membranes  LUNGS: reduced air entry bilaterally, clear to auscultation, symmetric breath sounds, no wheezing or rhonchi appreciated  HEART: soft S1/S2, regular rate and rhythm, no murmurs noted, no noted edema to b/l LE  GASTROINTESTINAL: abdomen is soft, nontender, nondistended, normoactive bowel sounds, no palpable masses  MUSCULOSKELETAL: no clubbing or cyanosis, no obvious deformity, no calf tenderness to palpation b/l  NEUROLOGIC: awake, alert, readily interactive, good muscle tone in 4 extremities    T(C): 36.9 (21 @ 13:55), Max: 37.1 (21 @ 23:41)  HR: 68 (21 @ 13:55) (56 - 76)  BP: 102/65 (21 @ 13:55) (102/65 - 113/70)  RR: 18 (21 @ 13:55) (17 - 18)  SpO2: 95% (21 @ 13:55) (91% - 95%)  Wt(kg): --    ----  INTAKE & OUTPUT:  I&O's Summary    19 Aug 2021 07:01  -  19 Aug 2021 17:59  --------------------------------------------------------  IN: 300 mL / OUT: 0 mL / NET: 300 mL        LABS:                        12.8   9.25  )-----------( 272      ( 19 Aug 2021 08:44 )             36.0     08-    140  |  109<H>  |  38<H>  ----------------------------<  163<H>  4.9   |  22  |  1.70<H>    Ca    9.0      19 Aug 2021 08:44  Phos  2.8       Mg     3.0         TPro  7.1  /  Alb  2.4<L>  /  TBili  1.8<H>  /  DBili  0.4  /  AST  27  /  ALT  22  /  AlkPhos  51      PT/INR - ( 19 Aug 2021 08:44 )   PT: 13.7 sec;   INR: 1.18 ratio             CAPILLARY BLOOD GLUCOSE      POCT Blood Glucose.: 309 mg/dL (19 Aug 2021 16:38)  POCT Blood Glucose.: 217 mg/dL (19 Aug 2021 11:17)  POCT Blood Glucose.: 129 mg/dL (19 Aug 2021 07:41)  POCT Blood Glucose.: 106 mg/dL (19 Aug 2021 05:58)  POCT Blood Glucose.: 86 mg/dL (18 Aug 2021 21:45)  POCT Blood Glucose.: 88 mg/dL (18 Aug 2021 21:12)            ----  COVID specific labs:    Auto Neutrophil #: 8.42 K/uL (21 @ 08:44)  Auto Neutrophil #: 5.06 K/uL (21 @ 06:09)  Auto Neutrophil #: 4.46 K/uL (21 @ 18:13)    Auto Lymphocyte #: 0.37 K/uL (21 @ 08:44)  Auto Lymphocyte #: 0.53 K/uL (21 @ 06:09)  Auto Lymphocyte #: 0.56 K/uL (21 @ 18:13)    Procalcitonin, Serum: 0.09 ng/mL (21 @ 11:00)  Procalcitonin, Serum: 0.41 ng/mL (21 @ 18:32)    Ferritin, Serum: 971 ng/mL (21 @ 17:02)  Ferritin, Serum: 1084 ng/mL (21 @ 01:40)      D-Dimer Assay, Quantitative: 747 ng/mL DDU (21 @ 11:00)  D-Dimer Assay, Quantitative: 323 ng/mL DDU (21 @ 18:13)                Lactate, Blood: 1.7 mmol/L (21 @ 22:04)  Lactate, Blood: 2.3 mmol/L (21 @ 18:13)    Prothrombin Time, Plasma: 13.7 sec (21 @ 08:44)  Prothrombin Time, Plasma: 13.5 sec (21 @ 11:00)  Prothrombin Time, Plasma: 12.8 sec (21 @ 09:13)  Prothrombin Time, Plasma: 12.2 sec (21 @ 09:16)  Prothrombin Time, Plasma: 12.8 sec (08-15-21 @ 18:40)

## 2021-08-19 NOTE — PROGRESS NOTE ADULT - SUBJECTIVE AND OBJECTIVE BOX
CAPILLARY BLOOD GLUCOSE      POCT Blood Glucose.: 217 mg/dL (19 Aug 2021 11:17)  POCT Blood Glucose.: 129 mg/dL (19 Aug 2021 07:41)  POCT Blood Glucose.: 106 mg/dL (19 Aug 2021 05:58)  POCT Blood Glucose.: 86 mg/dL (18 Aug 2021 21:45)  POCT Blood Glucose.: 88 mg/dL (18 Aug 2021 21:12)  POCT Blood Glucose.: 143 mg/dL (18 Aug 2021 16:53)  POCT Blood Glucose.: 125 mg/dL (18 Aug 2021 12:15)      Vital Signs Last 24 Hrs  T(C): 37.1 (19 Aug 2021 06:16), Max: 37.4 (18 Aug 2021 13:06)  T(F): 98.7 (19 Aug 2021 06:16), Max: 99.3 (18 Aug 2021 13:06)  HR: 76 (19 Aug 2021 06:16) (56 - 76)  BP: 109/72 (19 Aug 2021 06:16) (101/65 - 113/70)  BP(mean): --  RR: 17 (19 Aug 2021 06:16) (17 - 18)  SpO2: 92% (19 Aug 2021 06:16) (91% - 94%)     08-19    140  |  109<H>  |  38<H>  ----------------------------<  163<H>  4.9   |  22  |  1.70<H>    Ca    9.0      19 Aug 2021 08:44  Phos  2.8     08-18  Mg     3.0     08-18    TPro  7.1  /  Alb  2.4<L>  /  TBili  1.8<H>  /  DBili  x   /  AST  27  /  ALT  22  /  AlkPhos  51  08-19      dexAMETHasone     Tablet 6 milliGRAM(s) Oral daily  dextrose 40% Gel 15 Gram(s) Oral once  dextrose 50% Injectable 25 Gram(s) IV Push once  dextrose 50% Injectable 12.5 Gram(s) IV Push once  dextrose 50% Injectable 25 Gram(s) IV Push once  glucagon  Injectable 1 milliGRAM(s) IntraMuscular once  insulin glargine Injectable (LANTUS) 6 Unit(s) SubCutaneous every morning  insulin glargine Injectable (LANTUS) 6 Unit(s) SubCutaneous at bedtime  insulin lispro (ADMELOG) corrective regimen sliding scale   SubCutaneous three times a day before meals

## 2021-08-19 NOTE — PROGRESS NOTE ADULT - ASSESSMENT
62 yo male with PMHs of HTN and T2DM (insulin dependent) presented to the ED complaining SOB and generalized weakness since 08/11/2021. Tested positive for COVID-19 on 08/05/2021. Patient admitted for acute respiratory failure secondary to COVID-19 PNA.    # Acute respiratory failure secondary to COVID-19 PNA  - Remains on nasal canula 2L NC, maintain sats >92%, cont pulse ox monitoring  - during day today he's been ambulating and maintaining O2 sats in mid-90's but overnight he dropped into 70's while ambulating to restroom  - Continue Decadron 6mg day 6/10   - s/p remdesivir  - Tylenol prn myalgias, fever  - AC with heparin 5000 sq q8   - monitor inflammatory markers as clinically indicated  - Infectious disease Dr Aguayo following, recs appreciated  - this tx plan was formulated utilizing my clinical judgement, currently available local/regional anecdotal information, organizational treatment recommendations with COVID-19 specific considerations given rapidly changing information available     # SORAIDA on CKD, probably prerenal in the setting of dehydration   - Improving renal function with hydration, Creat 2.8 --> 2.2 -->2.0 -->1.9  -> 1.6 --> 1.6 --> 1.7  - Per chart review, prior creatinine 1.3 in 2017, likely CKDIII   - Nephro (Dr. Vaughan) following, recs appreciated    # DM type 2, uncontrolled  - Uncontrolled blood glucose in setting of steroid use and A1c of 10.6%  - Continue insulin  - Endocrine (Dr Craig Perlman) following, recs appreciated  - Accu checks w/ hypoglycemic protocol  - adjusted insulin last night due to hypoglycemic episode, will adjust further as clinically warranted    Thrombocytopenia, resolved  - Likely reactive in the setting of COVID-19 infection    # Hypertension, chronic  - Previously hypotensive during hospital stay  - BPs have intermittently been soft  - Holding home Edarbyclor 40-12.5 mg qd  (azilsartan/chlorthalidone) due to SORAIDA and hypotension    # DVT Prophylaxis   - Heparin 5000 SQ q8

## 2021-08-19 NOTE — PROGRESS NOTE ADULT - SUBJECTIVE AND OBJECTIVE BOX
Patient is a 61y old  Male who presents with a chief complaint of acute hypoxic respiratory failure (13 Aug 2021 19:56)       HPI:  60 yo Male patient with PMHx of HTN, DM type 2 (insulin dependent) presents today complaining SOB and generalized weakness since 08/11/2021. Pt endorses that came back from [place] on August 5th and tested positive for COVID-19, states he was in contact with a friend that tested positive. Since 08/11/2021 has been progressively worsening  generalized weakness and exertional dyspnea, associated with cough, decreased appetite with poor oral intake, decreases smell and one episode of vomiting today. Patient before was able to walk long distances, climb stairs w/o dyspna  At this time he denies  fever, chills, chest pain, palpitations, abdominal pain, diarrhea, constipation, urinary frequency, urgency, hematuria, hematochezia, melena or dysuria, changes in vision, dizziness, numbness, tingling. He is not vaccinated against covid19  Renal is being consulted for SORAIDA/CKD. No urinary c/o.     INTERVAL HISTORY:   No acute overnight events. Pt with no complaints this morning. States that SOB is improving.     PAST MEDICAL & SURGICAL HISTORY:  Hypertension    Diabetes mellitus    No significant past surgical history         FAMILY HISTORY:  FH: HTN (hypertension) (Father, Mother)    NC    Social History:Non smoker    MEDICATIONS  (STANDING):  dexAMETHasone     Tablet 6 milliGRAM(s) Oral daily  dextrose 40% Gel 15 Gram(s) Oral once  dextrose 5%. 1000 milliLiter(s) (50 mL/Hr) IV Continuous <Continuous>  dextrose 5%. 1000 milliLiter(s) (100 mL/Hr) IV Continuous <Continuous>  dextrose 50% Injectable 25 Gram(s) IV Push once  dextrose 50% Injectable 12.5 Gram(s) IV Push once  dextrose 50% Injectable 25 Gram(s) IV Push once  glucagon  Injectable 1 milliGRAM(s) IntraMuscular once  heparin   Injectable 5000 Unit(s) SubCutaneous every 8 hours  insulin glargine Injectable (LANTUS) 6 Unit(s) SubCutaneous every morning  insulin glargine Injectable (LANTUS) 6 Unit(s) SubCutaneous at bedtime  insulin lispro (ADMELOG) corrective regimen sliding scale   SubCutaneous three times a day before meals  remdesivir  IVPB   IV Intermittent   remdesivir  IVPB 100 milliGRAM(s) IV Intermittent every 24 hours    MEDICATIONS  (PRN):  acetaminophen   Tablet .. 650 milliGRAM(s) Oral every 6 hours PRN Temp greater or equal to 38.5C (101.3F), Mild Pain (1 - 3)  ondansetron Injectable 4 milliGRAM(s) IV Push every 8 hours PRN Nausea and/or Vomiting    Allergies    calcium channel blockers (Other)    Intolerances     REVIEW OF SYSTEMS:    CONSTITUTIONAL:  No weight loss   EYES: No eye pain, visual disturbances, or discharge  ENMT:  No difficulty hearing, tinnitus, vertigo; No sinus or throat pain  NECK: No pain or stiffness  RESPIRATORY: +SOB; No wheeze.  CARDIOVASCULAR: No chest pain, palpitations, dizziness,   GASTROINTESTINAL: No abdominal or epigastric pain. No nausea, vomiting, or hematemesis; No diarrhea or constipation. No melena or hematochezia.  GENITOURINARY: No dysuria, frequency, hematuria, or incontinence  NEUROLOGICAL: No headaches, memory loss, loss of strength, numbness, or tremors  LYMPH NODES: No enlarged glands  MUSCULOSKELETAL: No joint pain or swelling       PHYSICAL EXAM:  Vital Signs Last 24 Hrs  T(C): 37.1 (19 Aug 2021 06:16), Max: 37.4 (18 Aug 2021 13:06)  T(F): 98.7 (19 Aug 2021 06:16), Max: 99.3 (18 Aug 2021 13:06)  HR: 76 (19 Aug 2021 06:16) (56 - 76)  BP: 109/72 (19 Aug 2021 06:16) (101/65 - 113/70)  BP(mean): --  RR: 17 (19 Aug 2021 06:16) (17 - 18)  SpO2: 92% (19 Aug 2021 06:16) (91% - 94%)    CONSTITUTIONAL: NAD, well-developed  RESPIRATORY: Normal respiratory effort; lungs are clear to auscultation bilaterally  CARDIOVASCULAR: Regular rate and rhythm, normal S1 and S2, no murmur/rub/gallop; No lower extremity edema; Peripheral pulses are 2+ bilaterally  ABDOMEN: Nontender to palpation, normoactive bowel sounds, no rebound/guarding; No hepatosplenomegaly  MUSCLOSKELETAL: no clubbing or cyanosis of digits; no joint swelling or tenderness to palpation  NEURO: CN 2-12 grossly intact, moves all limbs spontaneously  PSYCH: A+O to person, place, and time; affect appropriate    LABS:                        12.8   9.25  )-----------( 272      ( 19 Aug 2021 08:44 )             36.0     08-19    140  |  109<H>  |  38<H>  ----------------------------<  163<H>  4.9   |  22  |  1.70<H>    Ca    9.0      19 Aug 2021 08:44  Phos  2.8     08-18  Mg     3.0     08-18    TPro  7.1  /  Alb  2.4<L>  /  TBili  1.8<H>  /  DBili  x   /  AST  27  /  ALT  22  /  AlkPhos  51  08-19    PT/INR - ( 19 Aug 2021 08:44 )   PT: 13.7 sec;   INR: 1.18 ratio              Patient is a 61y old  Male who presents with a chief complaint of acute hypoxic respiratory failure (13 Aug 2021 19:56)       HPI:  62 yo Male patient with PMHx of HTN, DM type 2 (insulin dependent) presents today complaining SOB and generalized weakness since 08/11/2021. Pt endorses that came back from [place] on August 5th and tested positive for COVID-19, states he was in contact with a friend that tested positive. Since 08/11/2021 has been progressively worsening  generalized weakness and exertional dyspnea, associated with cough, decreased appetite with poor oral intake, decreases smell and one episode of vomiting today. Patient before was able to walk long distances, climb stairs w/o dyspna  At this time he denies  fever, chills, chest pain, palpitations, abdominal pain, diarrhea, constipation, urinary frequency, urgency, hematuria, hematochezia, melena or dysuria, changes in vision, dizziness, numbness, tingling. He is not vaccinated against covid19  Renal is being consulted for SORAIDA/CKD. No urinary c/o.     INTERVAL HISTORY:   No acute overnight events. Pt with no urinary or other complaints this morning. States that SOB is improving.     PAST MEDICAL & SURGICAL HISTORY:  Hypertension    Diabetes mellitus    No significant past surgical history         FAMILY HISTORY:  FH: HTN (hypertension) (Father, Mother)    NC    Social History:Non smoker    MEDICATIONS  (STANDING):  dexAMETHasone     Tablet 6 milliGRAM(s) Oral daily  dextrose 40% Gel 15 Gram(s) Oral once  dextrose 5%. 1000 milliLiter(s) (50 mL/Hr) IV Continuous <Continuous>  dextrose 5%. 1000 milliLiter(s) (100 mL/Hr) IV Continuous <Continuous>  dextrose 50% Injectable 25 Gram(s) IV Push once  dextrose 50% Injectable 12.5 Gram(s) IV Push once  dextrose 50% Injectable 25 Gram(s) IV Push once  glucagon  Injectable 1 milliGRAM(s) IntraMuscular once  heparin   Injectable 5000 Unit(s) SubCutaneous every 8 hours  insulin glargine Injectable (LANTUS) 6 Unit(s) SubCutaneous every morning  insulin glargine Injectable (LANTUS) 6 Unit(s) SubCutaneous at bedtime  insulin lispro (ADMELOG) corrective regimen sliding scale   SubCutaneous three times a day before meals  remdesivir  IVPB   IV Intermittent   remdesivir  IVPB 100 milliGRAM(s) IV Intermittent every 24 hours    MEDICATIONS  (PRN):  acetaminophen   Tablet .. 650 milliGRAM(s) Oral every 6 hours PRN Temp greater or equal to 38.5C (101.3F), Mild Pain (1 - 3)  ondansetron Injectable 4 milliGRAM(s) IV Push every 8 hours PRN Nausea and/or Vomiting    Allergies    calcium channel blockers (Other)    Intolerances     REVIEW OF SYSTEMS:    CONSTITUTIONAL:  No weight loss   EYES: No eye pain, visual disturbances, or discharge  ENMT:  No difficulty hearing, tinnitus, vertigo; No sinus or throat pain  NECK: No pain or stiffness  RESPIRATORY: +SOB; No wheeze.  CARDIOVASCULAR: No chest pain, palpitations, dizziness,   GASTROINTESTINAL: No abdominal or epigastric pain. No nausea, vomiting, or hematemesis; No diarrhea or constipation. No melena or hematochezia.  GENITOURINARY: No dysuria, frequency, hematuria, or incontinence  NEUROLOGICAL: No headaches, memory loss, loss of strength, numbness, or tremors  LYMPH NODES: No enlarged glands  MUSCULOSKELETAL: No joint pain or swelling       PHYSICAL EXAM:  Vital Signs Last 24 Hrs  T(C): 37.1 (19 Aug 2021 06:16), Max: 37.4 (18 Aug 2021 13:06)  T(F): 98.7 (19 Aug 2021 06:16), Max: 99.3 (18 Aug 2021 13:06)  HR: 76 (19 Aug 2021 06:16) (56 - 76)  BP: 109/72 (19 Aug 2021 06:16) (101/65 - 113/70)  BP(mean): --  RR: 17 (19 Aug 2021 06:16) (17 - 18)  SpO2: 92% (19 Aug 2021 06:16) (91% - 94%)    CONSTITUTIONAL: NAD, well-developed  RESPIRATORY: Normal respiratory effort; lungs are clear to auscultation bilaterally  CARDIOVASCULAR: Regular rate and rhythm, normal S1 and S2, no murmur/rub/gallop; No lower extremity edema; Peripheral pulses are 2+ bilaterally  ABDOMEN: Nontender to palpation, normoactive bowel sounds, no rebound/guarding; No hepatosplenomegaly  MUSCLOSKELETAL: no clubbing or cyanosis of digits; no joint swelling or tenderness to palpation  NEURO: CN 2-12 grossly intact, moves all limbs spontaneously  PSYCH: A+O to person, place, and time; affect appropriate    LABS:                        12.8   9.25  )-----------( 272      ( 19 Aug 2021 08:44 )             36.0     08-19    140  |  109<H>  |  38<H>  ----------------------------<  163<H>  4.9   |  22  |  1.70<H>    Ca    9.0      19 Aug 2021 08:44  Phos  2.8     08-18  Mg     3.0     08-18    TPro  7.1  /  Alb  2.4<L>  /  TBili  1.8<H>  /  DBili  x   /  AST  27  /  ALT  22  /  AlkPhos  51  08-19    PT/INR - ( 19 Aug 2021 08:44 )   PT: 13.7 sec;   INR: 1.18 ratio

## 2021-08-19 NOTE — PROGRESS NOTE ADULT - ATTENDING COMMENTS
Patient seen and examined.  Discussed assessment and plan with resident.  Agree with above except where changed by me
Acute on CKD Stage 3 with acute kidney injury stemming from COVID mediated ATN  Renal indices are stable though above baseline; new baseline?  PO hydration  Avoid additional IVF for now unless hypotensive  Check Renal sono
Patient seen and examined.  Discussed assessment and plan with resident.  Agree with above except where changed by me
62 yo male with PMHs of HTN and T2DM (insulin dependent) presented to the ED complaining SOB and generalized weakness since 08/11/2021. Tested positive for COVID-19 on 08/05/2021. Patient admitted for acute respiratory failure secondary to COVID-19 PNA. Improving, steroids day 5, Remdesivir day 4. Anticipate discharge home 8/19 +/- supplemental oxygen, most likely without as he is maintaining adequate saturation even with ambulation on RA.

## 2021-08-19 NOTE — PROGRESS NOTE ADULT - ASSESSMENT
SORAIDA/CKD  COVID19 PNA  Acute respiratory failure  DM  HTN      -Cr 1.35 on 2017 suggest CKDIII baseline. Mild increase in Cr on AM labs, continue to monitor.   -SORAIDA clinically pre-renal. Urine studies suggest pre-renal etiology. However, cannot r/o COVID direct renal injury though less likely.  -Avoid excessive IVF with COVID pulmonary status  -Consider renal US if Cr begins to uptrend  -No indication for RRT at this time

## 2021-08-19 NOTE — CHART NOTE - NSCHARTNOTEFT_GEN_A_CORE
Assessment: Chart reviewed, events noted.     Pt seen at bedside. Patient with good PO intake, >50% in house. Last BM x1 8/19.    Factors impacting intake: [x ] none [ ] nausea  [ ] vomiting [ ] diarrhea [ ] constipation  [ ]chewing problems [ ] swallowing issues  [ ] other:     Diet Presciption: Diet, Consistent Carbohydrate w/Evening Snack:   DASH/TLC {Sodium & Cholesterol Restricted} (08-13-21 @ 21:15)    Intake: 50-75%    Current Weight: Weight (kg): 84.4 (08-13 @ 17:35)  % Weight Change    Pertinent Medications: MEDICATIONS  (STANDING):  dexAMETHasone     Tablet 6 milliGRAM(s) Oral daily  dextrose 40% Gel 15 Gram(s) Oral once  dextrose 5%. 1000 milliLiter(s) (50 mL/Hr) IV Continuous <Continuous>  dextrose 5%. 1000 milliLiter(s) (100 mL/Hr) IV Continuous <Continuous>  dextrose 50% Injectable 25 Gram(s) IV Push once  dextrose 50% Injectable 12.5 Gram(s) IV Push once  dextrose 50% Injectable 25 Gram(s) IV Push once  glucagon  Injectable 1 milliGRAM(s) IntraMuscular once  heparin   Injectable 5000 Unit(s) SubCutaneous every 8 hours  insulin glargine Injectable (LANTUS) 6 Unit(s) SubCutaneous every morning  insulin glargine Injectable (LANTUS) 6 Unit(s) SubCutaneous at bedtime  insulin lispro (ADMELOG) corrective regimen sliding scale   SubCutaneous three times a day before meals  remdesivir  IVPB   IV Intermittent   remdesivir  IVPB 100 milliGRAM(s) IV Intermittent every 24 hours    MEDICATIONS  (PRN):  acetaminophen   Tablet .. 650 milliGRAM(s) Oral every 6 hours PRN Temp greater or equal to 38.5C (101.3F), Mild Pain (1 - 3)  ondansetron Injectable 4 milliGRAM(s) IV Push every 8 hours PRN Nausea and/or Vomiting    Pertinent Labs: 08-19 Na140 mmol/L Glu 163 mg/dL<H> K+ 4.9 mmol/L Cr  1.70 mg/dL<H> BUN 38 mg/dL<H> 08-18 Phos 2.8 mg/dL 08-19 Alb 2.4 g/dL<L>     CAPILLARY BLOOD GLUCOSE      POCT Blood Glucose.: 217 mg/dL (19 Aug 2021 11:17)  POCT Blood Glucose.: 129 mg/dL (19 Aug 2021 07:41)  POCT Blood Glucose.: 106 mg/dL (19 Aug 2021 05:58)  POCT Blood Glucose.: 86 mg/dL (18 Aug 2021 21:45)  POCT Blood Glucose.: 88 mg/dL (18 Aug 2021 21:12)  POCT Blood Glucose.: 143 mg/dL (18 Aug 2021 16:53)    Skin: intact.     Estimated Needs:   [x ] no change since previous assessment  [ ] recalculated:     Previous Nutrition Diagnosis:   [ ] Inadequate Energy Intake [ ]Inadequate Oral Intake [ ] Excessive Energy Intake   [ ] Underweight [ x] Increased Nutrient Needs [ ] Overweight/Obesity   [x ] Altered nutrition related labs [ ] Unintended Weight Loss [ ] Food & Nutrition Related Knowledge Deficit [ ] Malnutrition     Nutrition Diagnosis is [ ] ongoing  [ ] resolved [ ] not applicable     New Nutrition Diagnosis: [ ] not applicable       Interventions:   Recommend  [ ] Change Diet To:  [ ] Nutrition Supplement  [ ] Nutrition Support  [ x] Other: 1. continue current diet. 2. Continue to monitor patient PO intake, labs, weight, skin, edema, GI distress, follow up upon protocol.     Monitoring and Evaluation:   [x ] PO intake [ x ] Tolerance to diet prescription [ x ] weights [ x ] labs[ x ] follow up per protocol  [ ] other:

## 2021-08-20 LAB
ALBUMIN SERPL ELPH-MCNC: 2.2 G/DL — LOW (ref 3.3–5)
ALBUMIN SERPL ELPH-MCNC: 2.3 G/DL — LOW (ref 3.3–5)
ALP SERPL-CCNC: 47 U/L — SIGNIFICANT CHANGE UP (ref 40–120)
ALP SERPL-CCNC: 48 U/L — SIGNIFICANT CHANGE UP (ref 40–120)
ALT FLD-CCNC: 19 U/L — SIGNIFICANT CHANGE UP (ref 12–78)
ALT FLD-CCNC: 22 U/L — SIGNIFICANT CHANGE UP (ref 12–78)
ANION GAP SERPL CALC-SCNC: 7 MMOL/L — SIGNIFICANT CHANGE UP (ref 5–17)
AST SERPL-CCNC: 21 U/L — SIGNIFICANT CHANGE UP (ref 15–37)
AST SERPL-CCNC: 22 U/L — SIGNIFICANT CHANGE UP (ref 15–37)
BASOPHILS # BLD AUTO: 0.01 K/UL — SIGNIFICANT CHANGE UP (ref 0–0.2)
BASOPHILS NFR BLD AUTO: 0.1 % — SIGNIFICANT CHANGE UP (ref 0–2)
BILIRUB DIRECT SERPL-MCNC: 0.3 MG/DL — HIGH (ref 0.05–0.2)
BILIRUB INDIRECT FLD-MCNC: 1.1 MG/DL — HIGH (ref 0.2–1)
BILIRUB SERPL-MCNC: 1.2 MG/DL — SIGNIFICANT CHANGE UP (ref 0.2–1.2)
BILIRUB SERPL-MCNC: 1.4 MG/DL — HIGH (ref 0.2–1.2)
BUN SERPL-MCNC: 37 MG/DL — HIGH (ref 7–23)
CALCIUM SERPL-MCNC: 8.7 MG/DL — SIGNIFICANT CHANGE UP (ref 8.5–10.1)
CHLORIDE SERPL-SCNC: 107 MMOL/L — SIGNIFICANT CHANGE UP (ref 96–108)
CO2 SERPL-SCNC: 22 MMOL/L — SIGNIFICANT CHANGE UP (ref 22–31)
CREAT SERPL-MCNC: 1.5 MG/DL — HIGH (ref 0.5–1.3)
CRP SERPL-MCNC: 83 MG/L — HIGH
D DIMER BLD IA.RAPID-MCNC: 1214 NG/ML DDU — HIGH
EOSINOPHIL # BLD AUTO: 0.05 K/UL — SIGNIFICANT CHANGE UP (ref 0–0.5)
EOSINOPHIL NFR BLD AUTO: 0.5 % — SIGNIFICANT CHANGE UP (ref 0–6)
FERRITIN SERPL-MCNC: 959 NG/ML — HIGH (ref 30–400)
GLUCOSE SERPL-MCNC: 205 MG/DL — HIGH (ref 70–99)
HCT VFR BLD CALC: 33.6 % — LOW (ref 39–50)
HGB BLD-MCNC: 12.1 G/DL — LOW (ref 13–17)
IMM GRANULOCYTES NFR BLD AUTO: 2.9 % — HIGH (ref 0–1.5)
INR BLD: 1.18 RATIO — HIGH (ref 0.88–1.16)
LYMPHOCYTES # BLD AUTO: 0.39 K/UL — LOW (ref 1–3.3)
LYMPHOCYTES # BLD AUTO: 4.2 % — LOW (ref 13–44)
MAGNESIUM SERPL-MCNC: 2.4 MG/DL — SIGNIFICANT CHANGE UP (ref 1.6–2.6)
MCHC RBC-ENTMCNC: 29.6 PG — SIGNIFICANT CHANGE UP (ref 27–34)
MCHC RBC-ENTMCNC: 36 GM/DL — SIGNIFICANT CHANGE UP (ref 32–36)
MCV RBC AUTO: 82.2 FL — SIGNIFICANT CHANGE UP (ref 80–100)
MONOCYTES # BLD AUTO: 0.21 K/UL — SIGNIFICANT CHANGE UP (ref 0–0.9)
MONOCYTES NFR BLD AUTO: 2.2 % — SIGNIFICANT CHANGE UP (ref 2–14)
NEUTROPHILS # BLD AUTO: 8.42 K/UL — HIGH (ref 1.8–7.4)
NEUTROPHILS NFR BLD AUTO: 90.1 % — HIGH (ref 43–77)
NRBC # BLD: 0 /100 WBCS — SIGNIFICANT CHANGE UP (ref 0–0)
PHOSPHATE SERPL-MCNC: 3 MG/DL — SIGNIFICANT CHANGE UP (ref 2.5–4.5)
PLATELET # BLD AUTO: 271 K/UL — SIGNIFICANT CHANGE UP (ref 150–400)
POTASSIUM SERPL-MCNC: 4.8 MMOL/L — SIGNIFICANT CHANGE UP (ref 3.5–5.3)
POTASSIUM SERPL-SCNC: 4.8 MMOL/L — SIGNIFICANT CHANGE UP (ref 3.5–5.3)
PROCALCITONIN SERPL-MCNC: 0.12 NG/ML — HIGH (ref 0–0.04)
PROT SERPL-MCNC: 6.6 G/DL — SIGNIFICANT CHANGE UP (ref 6–8.3)
PROT SERPL-MCNC: 6.7 G/DL — SIGNIFICANT CHANGE UP (ref 6–8.3)
PROTHROM AB SERPL-ACNC: 13.7 SEC — HIGH (ref 10.6–13.6)
RBC # BLD: 4.09 M/UL — LOW (ref 4.2–5.8)
RBC # FLD: 14 % — SIGNIFICANT CHANGE UP (ref 10.3–14.5)
SODIUM SERPL-SCNC: 136 MMOL/L — SIGNIFICANT CHANGE UP (ref 135–145)
WBC # BLD: 9.35 K/UL — SIGNIFICANT CHANGE UP (ref 3.8–10.5)
WBC # FLD AUTO: 9.35 K/UL — SIGNIFICANT CHANGE UP (ref 3.8–10.5)

## 2021-08-20 PROCEDURE — 99233 SBSQ HOSP IP/OBS HIGH 50: CPT | Mod: GC

## 2021-08-20 PROCEDURE — 99232 SBSQ HOSP IP/OBS MODERATE 35: CPT

## 2021-08-20 RX ORDER — SODIUM CHLORIDE 9 MG/ML
1000 INJECTION INTRAMUSCULAR; INTRAVENOUS; SUBCUTANEOUS
Refills: 0 | Status: DISCONTINUED | OUTPATIENT
Start: 2021-08-20 | End: 2021-08-21

## 2021-08-20 RX ORDER — INSULIN LISPRO 100/ML
6 VIAL (ML) SUBCUTANEOUS
Refills: 0 | Status: DISCONTINUED | OUTPATIENT
Start: 2021-08-20 | End: 2021-08-23

## 2021-08-20 RX ORDER — INSULIN LISPRO 100/ML
6 VIAL (ML) SUBCUTANEOUS
Refills: 0 | Status: DISCONTINUED | OUTPATIENT
Start: 2021-08-20 | End: 2021-08-20

## 2021-08-20 RX ADMIN — INSULIN GLARGINE 6 UNIT(S): 100 INJECTION, SOLUTION SUBCUTANEOUS at 21:22

## 2021-08-20 RX ADMIN — HEPARIN SODIUM 5000 UNIT(S): 5000 INJECTION INTRAVENOUS; SUBCUTANEOUS at 13:31

## 2021-08-20 RX ADMIN — Medication 6 UNIT(S): at 11:18

## 2021-08-20 RX ADMIN — Medication 6 UNIT(S): at 16:19

## 2021-08-20 RX ADMIN — SODIUM CHLORIDE 100 MILLILITER(S): 9 INJECTION INTRAMUSCULAR; INTRAVENOUS; SUBCUTANEOUS at 21:21

## 2021-08-20 RX ADMIN — Medication 6 MILLIGRAM(S): at 05:13

## 2021-08-20 RX ADMIN — Medication 8: at 11:16

## 2021-08-20 RX ADMIN — Medication 4: at 07:55

## 2021-08-20 RX ADMIN — HEPARIN SODIUM 5000 UNIT(S): 5000 INJECTION INTRAVENOUS; SUBCUTANEOUS at 05:13

## 2021-08-20 RX ADMIN — Medication 8: at 16:18

## 2021-08-20 RX ADMIN — INSULIN GLARGINE 6 UNIT(S): 100 INJECTION, SOLUTION SUBCUTANEOUS at 07:56

## 2021-08-20 RX ADMIN — HEPARIN SODIUM 5000 UNIT(S): 5000 INJECTION INTRAVENOUS; SUBCUTANEOUS at 21:19

## 2021-08-20 NOTE — PROGRESS NOTE ADULT - SUBJECTIVE AND OBJECTIVE BOX
Patient is a 61y old  Male who presents with a chief complaint of acute hypoxic respiratory failure (13 Aug 2021 19:56)       HPI:  62 yo Male patient with PMHx of HTN, DM type 2 (insulin dependent) presents today complaining SOB and generalized weakness since 08/11/2021. Pt endorses that came back from [place] on August 5th and tested positive for COVID-19, states he was in contact with a friend that tested positive. Since 08/11/2021 has been progressively worsening  generalized weakness and exertional dyspnea, associated with cough, decreased appetite with poor oral intake, decreases smell and one episode of vomiting today. Patient before was able to walk long distances, climb stairs w/o dyspna  At this time he denies  fever, chills, chest pain, palpitations, abdominal pain, diarrhea, constipation, urinary frequency, urgency, hematuria, hematochezia, melena or dysuria, changes in vision, dizziness, numbness, tingling. He is not vaccinated against covid19  Renal is being consulted for SORAIDA/CKD. No urinary c/o.     No acute events noted    INTERVAL HISTORY:   No acute overnight events. Pt with no urinary or other complaints this morning. States that SOB is improving.     PAST MEDICAL & SURGICAL HISTORY:  Hypertension    Diabetes mellitus    No significant past surgical history         FAMILY HISTORY:  FH: HTN (hypertension) (Father, Mother)    NC    Social History:Non smoker    MEDICATIONS  (STANDING):  dexAMETHasone     Tablet 6 milliGRAM(s) Oral daily  dextrose 40% Gel 15 Gram(s) Oral once  dextrose 5%. 1000 milliLiter(s) (50 mL/Hr) IV Continuous <Continuous>  dextrose 5%. 1000 milliLiter(s) (100 mL/Hr) IV Continuous <Continuous>  dextrose 50% Injectable 25 Gram(s) IV Push once  dextrose 50% Injectable 12.5 Gram(s) IV Push once  dextrose 50% Injectable 25 Gram(s) IV Push once  glucagon  Injectable 1 milliGRAM(s) IntraMuscular once  heparin   Injectable 5000 Unit(s) SubCutaneous every 8 hours  insulin glargine Injectable (LANTUS) 6 Unit(s) SubCutaneous every morning  insulin glargine Injectable (LANTUS) 6 Unit(s) SubCutaneous at bedtime  insulin lispro (ADMELOG) corrective regimen sliding scale   SubCutaneous three times a day before meals  remdesivir  IVPB   IV Intermittent   remdesivir  IVPB 100 milliGRAM(s) IV Intermittent every 24 hours    MEDICATIONS  (PRN):  acetaminophen   Tablet .. 650 milliGRAM(s) Oral every 6 hours PRN Temp greater or equal to 38.5C (101.3F), Mild Pain (1 - 3)  ondansetron Injectable 4 milliGRAM(s) IV Push every 8 hours PRN Nausea and/or Vomiting    Allergies    calcium channel blockers (Other)    Intolerances     REVIEW OF SYSTEMS:    CONSTITUTIONAL:  No weight loss   EYES: No eye pain, visual disturbances, or discharge  ENMT:  No difficulty hearing, tinnitus, vertigo; No sinus or throat pain  NECK: No pain or stiffness  RESPIRATORY: +SOB; No wheeze.  CARDIOVASCULAR: No chest pain, palpitations, dizziness,   GASTROINTESTINAL: No abdominal or epigastric pain. No nausea, vomiting, or hematemesis; No diarrhea or constipation. No melena or hematochezia.  GENITOURINARY: No dysuria, frequency, hematuria, or incontinence  NEUROLOGICAL: No headaches, memory loss, loss of strength, numbness, or tremors  LYMPH NODES: No enlarged glands  MUSCULOSKELETAL: No joint pain or swelling       Vital Signs Last 24 Hrs  T(C): 36.9 (20 Aug 2021 05:08), Max: 36.9 (19 Aug 2021 13:55)  T(F): 98.5 (20 Aug 2021 05:08), Max: 98.5 (19 Aug 2021 13:55)  HR: 67 (20 Aug 2021 05:08) (62 - 68)  BP: 121/81 (20 Aug 2021 05:08) (102/65 - 121/81)  BP(mean): --  RR: 16 (20 Aug 2021 05:08) (16 - 18)  SpO2: 95% (20 Aug 2021 05:08) (93% - 95%)    Physical exam:  Exam deferred due to COVID 19 isolation and reduce risk of transmission    LABS:                                            12.1   9.35  )-----------( 271      ( 20 Aug 2021 10:03 )             33.6     08-20    136  |  107  |  37<H>  ----------------------------<  205<H>  4.8   |  22  |  1.50<H>    Ca    8.7      20 Aug 2021 10:05  Phos  3.0     08-20  Mg     2.4     08-20    TPro  6.6  /  Alb  2.2<L>  /  TBili  1.2  /  DBili  x   /  AST  22  /  ALT  22  /  AlkPhos  47  08-20    PT/INR - ( 20 Aug 2021 10:05 )   PT: 13.7 sec;   INR: 1.18 ratio

## 2021-08-20 NOTE — PROGRESS NOTE ADULT - TIME BILLING
Discussed w/ CM. Ideally, once on room air the plan is for d/c to home but the pt's profound weakness warrants some gentle IVF administration today and pt will likely require another 24hrs of observation prior to further dispo planning. Will continue to coordinate w/ CM, RN and interdisciplinary team. Although pt is improving generally he remains at risk for abrupt decompensation. Will monitor closely.

## 2021-08-20 NOTE — PROGRESS NOTE ADULT - SUBJECTIVE AND OBJECTIVE BOX
Name: MARIA LUZ ROCK  MRN: [id number]  LOCATION: [address]    ----  Patient is a 61y old  Male who presents with a chief complaint of acute hypoxic respiratory failure (20 Aug 2021 08:08)      FROM ADMISSION H+P:   HPI:  62 yo Male patient with PMHx of HTN, DM type 2 (insulin dependent) presents today complaining SOB and generalized weakness since 08/11/2021. Pt endorses that came back from [place] on August 5th and tested positive for COVID-19, states he was in contact with a friend that tested positive. Since 08/11/2021 has been progressively worsening  generalized weakness and exertional dyspnea, associated with cough, decreased appetite with poor oral intake, decreases smell and one episode of vomiting today. Patient before was able to walk long distances, climb stairs w/o dyspna    ----  INTERVAL HPI/OVERNIGHT EVENTS: Pt seen and evaluated at the bedside. No acute overnight events occurred. Pt reports profound weakness. he is able to ambulate to the restroom but he requires 10 minutes to "calm down" after ambulating to the restroom. He states that he feels severe shortness of breath despite my reassurance that his O2 sats are maintaining in mid 90's on room air. The patient has no other new complaints at this time. He's eating. He's drinking plenty of water. Voiding without difficulty. He states that when he ambulates he feels pressure associated w/ dyspnea but denies CP. Denies palpitations. No leg edema or calf tenderness. No skin rashes or itching. No fever, chills.     ----  PAST MEDICAL & SURGICAL HISTORY:  Hypertension    Diabetes mellitus    No significant past surgical history        FAMILY HISTORY:  FH: HTN (hypertension) (Father, Mother)        Allergies    calcium channel blockers (Other)    Intolerances        ----  ANTIMICROBIALS:    CARDIOVASCULAR:    GASTROINTESTINAL:    PULMONARY:      ----  REVIEW OF SYSTEMS:  comprehensive ROS as per HPI     ----  PHYSICAL EXAM:  GENERAL: patient appears generally uncomfortable, I observed him ambulate to the restroom and back to bed and he was unsteady on his feet and reports feeling extremely fatigued  ENMT: oropharynx clear without erythema, moist mucous membranes  LUNGS: reduced air entry bilaterally, coarse breath sounds throughout, symmetric breath sounds, no wheezing or rhonchi appreciated  HEART: soft S1/S2, regular rate and rhythm, no murmurs noted, no noted edema to b/l LE  GASTROINTESTINAL: abdomen is soft, nontender, nondistended, normoactive bowel sounds, no palpable masses  MUSCULOSKELETAL: no clubbing or cyanosis, no obvious deformity, no calf tenderness to palpation b/l  NEUROLOGIC: awake, alert, readily interactive, good muscle tone in 4 extremities    T(C): 36.9 (08-20-21 @ 05:08), Max: 36.9 (08-19-21 @ 13:55)  HR: 67 (08-20-21 @ 05:08) (62 - 68)  BP: 121/81 (08-20-21 @ 05:08) (102/65 - 121/81)  RR: 16 (08-20-21 @ 05:08) (16 - 18)  SpO2: 95% (08-20-21 @ 05:08) (93% - 95%)  Wt(kg): --    ----  INTAKE & OUTPUT:  I&O's Summary    19 Aug 2021 07:01  -  20 Aug 2021 07:00  --------------------------------------------------------  IN: 1030 mL / OUT: 0 mL / NET: 1030 mL        LABS:                        12.8   9.25  )-----------( 272      ( 19 Aug 2021 08:44 )             36.0     08-19    140  |  109<H>  |  38<H>  ----------------------------<  163<H>  4.9   |  22  |  1.70<H>    Ca    9.0      19 Aug 2021 08:44  Phos  2.8     08-18  Mg     3.0     08-18    TPro  7.1  /  Alb  2.4<L>  /  TBili  1.8<H>  /  DBili  0.4  /  AST  27  /  ALT  22  /  AlkPhos  51  08-19    PT/INR - ( 19 Aug 2021 08:44 )   PT: 13.7 sec;   INR: 1.18 ratio             CAPILLARY BLOOD GLUCOSE      POCT Blood Glucose.: 227 mg/dL (20 Aug 2021 07:45)  POCT Blood Glucose.: 284 mg/dL (20 Aug 2021 00:36)  POCT Blood Glucose.: 259 mg/dL (19 Aug 2021 21:54)  POCT Blood Glucose.: 309 mg/dL (19 Aug 2021 16:38)  POCT Blood Glucose.: 217 mg/dL (19 Aug 2021 11:17)          ----  Personally reviewed:  Vital sign trends: [ x ] yes    [  ] no     [  ] n/a  Laboratory results: [ x ] yes    [  ] no     [  ] n/a  Radiology results: [ x ] yes    [  ] no     [  ] n/a - cxr mostly clear from time of admission  Microbio results: [ x ] yes    [  ] no     [  ] n/a  Consultant recommendations: [ x ] yes    [  ] no     [  ] n/a

## 2021-08-20 NOTE — PROGRESS NOTE ADULT - SUBJECTIVE AND OBJECTIVE BOX
[facility]  INFECTIOUS DISEASES   [address] [place] [address]  Tel: [phone]     Fax: [phone]  =======================================================    MARIA LUZ ROCK [id number]    Follow up: COVID19    No complaint, minimal cough, no SOB, Seen this morning not on O2 at this point, but as per him gets SOB on and off and uses O2 on and off.  No fever. No GI symptoms.   Renal function improving.     Allergies:  calcium channel blockers (Other)    Antibiotics:  dexAMETHasone     Tablet 6 milliGRAM(s) Oral daily  remdesivir  IVPB   IV Intermittent        REVIEW OF SYSTEMS:  CONSTITUTIONAL:  No Fever or chills  HEENT:   No diplopia or blurred vision.  No earache, sore throat or runny nose.  CARDIOVASCULAR:  No chest pain or SOB  RESPIRATORY:  No cough, shortness of breath, PND or orthopnea.  GASTROINTESTINAL:  No nausea, vomiting or diarrhea.  GENITOURINARY:  No dysuria, frequency or urgency. No Blood in urine  MUSCULOSKELETAL:  no joint aches, no muscle pain  SKIN:  No change in skin, hair or nails.  NEUROLOGIC:  No paresthesias or weakness.  PSYCHIATRIC:  No disorder of thought or mood.  ENDOCRINE:  No heat or cold intolerance, polyuria or polydipsia.  HEMATOLOGICAL:  No easy bruising or bleeding.      Physical Exam:  Vital Signs Last 24 Hrs  T(C): 37.1 (20 Aug 2021 13:19), Max: 37.1 (20 Aug 2021 13:19)  T(F): 98.8 (20 Aug 2021 13:19), Max: 98.8 (20 Aug 2021 13:19)  HR: 65 (20 Aug 2021 13:19) (62 - 67)  BP: 100/65 (20 Aug 2021 13:19) (100/65 - 121/81)  BP(mean): --  RR: 17 (20 Aug 2021 13:19) (16 - 18)  SpO2: 94% (20 Aug 2021 13:19) (93% - 95%)  GEN: NAD  HEENT: normocephalic and atraumatic. EOMI. RUKHSANA.    NECK: Supple.  No lymphadenopathy   LUNGS: Clear to auscultation.  HEART: Regular rate and rhythm without murmur.  ABDOMEN: Soft, nontender, and nondistended.  Positive bowel sounds.    : No CVA tenderness  EXTREMITIES: Without any cyanosis, clubbing, rash, lesions or edema.  MSK: no joint swelling  NEUROLOGIC: grossly intact.  PSYCHIATRIC: Appropriate affect .  SKIN: No ulceration or induration present.      Labs:                        12.1   9.35  )-----------( 271      ( 20 Aug 2021 10:03 )             33.6         136  |  107  |  37<H>  ----------------------------<  205<H>  4.8   |  22  |  1.50<H>    Ca    8.7      20 Aug 2021 10:05  Phos  3.0       Mg     2.4         TPro  6.6  /  Alb  2.2<L>  /  TBili  1.2  /  DBili  x   /  AST  22  /  ALT  22  /  AlkPhos  47      Culture - Blood (collected 21 @ 01:02)  Source: .Blood Blood  Final Report (21 @ 02:00):    No Growth Final    Culture - Blood (collected 21 @ 01:02)  Source: .Blood Blood  Final Report (21 @ 02:00):    No Growth Final    WBC Count: 9.35 K/uL (21 @ 10:03)  WBC Count: 9.25 K/uL (21 @ 08:44)  WBC Count: 8.07 K/uL (21 @ 11:00)  WBC Count: 10.46 K/uL (21 @ 09:22)  WBC Count: 7.54 K/uL (21 @ 09:16)    Creatinine, Serum: 1.50 mg/dL (21 @ 10:05)  Creatinine, Serum: 1.70 mg/dL (21 @ 08:44)  Creatinine, Serum: 1.60 mg/dL (21 @ 11:00)  Creatinine, Serum: 1.60 mg/dL (21 @ 09:13)  Creatinine, Serum: 1.90 mg/dL (21 @ 09:16)  Creatinine, Serum: 2.00 mg/dL (08-15-21 @ 18:40)    C-Reactive Protein, Serum: 36 mg/L (21 @ 17:06)  C-Reactive Protein, Serum: 72 mg/L (21 @ 02:26)    Ferritin, Serum: 971 ng/mL (21 @ 17:02)  Ferritin, Serum: 1084 ng/mL (21 @ 01:40)    Procalcitonin, Serum: 0.12 ng/mL (21 @ 10:05)  Procalcitonin, Serum: 0.09 ng/mL (21 @ 11:00)  Procalcitonin, Serum: 0.41 ng/mL (21 @ 18:32)     COVID-19 PCR: Detected (21 @ 18:13)    All imaging and data are reviewed.   < from: Xray Chest 1 View-PORTABLE IMMEDIATE (21 @ 18:28) >  EXAM:  XR CHEST PORTABLE IMMED 1V                        PROCEDURE DATE:  2021    INTERPRETATION:  Short of breath, Covid positive  AP chest. Prior 5/15/2017.  Heart magnified by AP film shallow inspiration. Patchy left basilar retrocardiac atelectasis/infiltrate. Correlate for infection. No pleural effusion. Calcific bursitis left shoulder.  IMPRESSION: Patchy left basilar retrocardiac atelectasis/infiltrate.    Assessment and Plan:   61M with COVID and uncontrolled DM2   CXR (I personally reviewed) with left lung opacity     Remdesivir has not been shown to impact mortality. Thus far it has been shown to accomplish is decrease the length of hospitalization in patients with severe disease. In patient with moderate disease data showed clinical improvement in patient treated with 5 days of remdesivir, but not those treated for 10 days.    Criteria for use include:  • SpO2 < 94% on room air, OR requiring supplemental oxygen, OR requiring invasive mechanical ventilation, OR requiring ECMO (e.g moderate to critical disease)  • eGFR > 30 mL/min or less then 30 when the benefits outweigh the risks  • ALT < 5X ULN    Contraindications  • Use during pregnancy unless the potential benefits justify the potential risk for the mother and the fetus.  • Remdesivir should not be initiated in patients with ALT greater than or equal to 5 times the upper limit of normal (ULN) of baseline.  • Use in patients with renal impairment is based on potential risk/benefit considerations.  Remdesivir Dosing  • Adult patients greater than or equal to 40 k mG IV x 1 dose on day 1, followed by 100 mG IV q24h.  • Administration of Remdesivir in patients with eGFR less than 30 mL/min should be considered if the potential benefits outweigh the potential risks. There is a theoretical riskof accumulation of cyclodextrin excipient found in Remdesivir.  Treatment Duration  • Not requiring mechanical ventilation or ECMO, duration is 5 days of treatment.       If patient does not demonstrate clinical improvement, treatment may be extended for a total of 10 days if clinically indicated.  • Patients do not need to complete the course if they are stable for discharge.  Monitoring Parameters  • Daily renal and hepatic monitoring should be performed while on therapy       Discontinue therapy if patient is asymptomatic with ALT greater than or equal to 5 times the ULN, restart once ALT less than 5 times the ULN.       Discontinue therapy if ALT elevation is accompanied by signs or symptoms of liver inflammation or increasing conjugated bilirubin, alkaline phosphatase, or INR.  • Pregnancy Test  • Infusion-related reactions have been reported       Discontinue infusion and administer appropriate treatment.    Recommendation:  #COVID  Monitor clinically.  Monitor Oxygenation  O2 supplementation.  Completed Remdesivir for 5days  Monitor CBC, CMP daily  Ferritin, CRP, and D dimer q 48 hrs.  Dexamethasone 6mg q24hrs x10 days   Anticoagulation per protocol.  Treatment options are limited-this as well as limitations of data discussed with pt.  Monitor for any bacterial superinfection/complications.  This tx plan was formulated utilizing my clinical judgement, currently available local/regional anecdotal information, organizational treatment recommendations with COVID-19 specific considerations given rapidly changing information available.     #Uncontrolled DM with hyperglycemic   having very high glucose levels likely in the setting of steroids  endocrine consult    Will sign off please call with any question.     Macy Aguayo MD  Division of infectious Diseases  Cell [phone] between 8am and 6pm  After 6pm and over the weekends please call ID service line at [phone].

## 2021-08-20 NOTE — PROGRESS NOTE ADULT - SUBJECTIVE AND OBJECTIVE BOX
CAPILLARY BLOOD GLUCOSE      POCT Blood Glucose.: 227 mg/dL (20 Aug 2021 07:45)  POCT Blood Glucose.: 284 mg/dL (20 Aug 2021 00:36)  POCT Blood Glucose.: 259 mg/dL (19 Aug 2021 21:54)  POCT Blood Glucose.: 309 mg/dL (19 Aug 2021 16:38)  POCT Blood Glucose.: 217 mg/dL (19 Aug 2021 11:17)      Vital Signs Last 24 Hrs  T(C): 36.9 (20 Aug 2021 05:08), Max: 36.9 (19 Aug 2021 13:55)  T(F): 98.5 (20 Aug 2021 05:08), Max: 98.5 (19 Aug 2021 13:55)  HR: 67 (20 Aug 2021 05:08) (62 - 68)  BP: 121/81 (20 Aug 2021 05:08) (102/65 - 121/81)  BP(mean): --  RR: 16 (20 Aug 2021 05:08) (16 - 18)  SpO2: 95% (20 Aug 2021 05:08) (93% - 95%)       08-19    140  |  109<H>  |  38<H>  ----------------------------<  163<H>  4.9   |  22  |  1.70<H>    Ca    9.0      19 Aug 2021 08:44  Phos  2.8     08-18  Mg     3.0     08-18    TPro  7.1  /  Alb  2.4<L>  /  TBili  1.8<H>  /  DBili  0.4  /  AST  27  /  ALT  22  /  AlkPhos  51  08-19      dexAMETHasone     Tablet 6 milliGRAM(s) Oral daily  dextrose 40% Gel 15 Gram(s) Oral once  dextrose 50% Injectable 25 Gram(s) IV Push once  dextrose 50% Injectable 12.5 Gram(s) IV Push once  dextrose 50% Injectable 25 Gram(s) IV Push once  glucagon  Injectable 1 milliGRAM(s) IntraMuscular once  insulin glargine Injectable (LANTUS) 6 Unit(s) SubCutaneous every morning  insulin glargine Injectable (LANTUS) 6 Unit(s) SubCutaneous at bedtime  insulin lispro (ADMELOG) corrective regimen sliding scale   SubCutaneous at bedtime  insulin lispro (ADMELOG) corrective regimen sliding scale   SubCutaneous three times a day before meals

## 2021-08-20 NOTE — PROGRESS NOTE ADULT - ASSESSMENT
62 yo male with PMHs of HTN and T2DM (insulin dependent) presented to the ED complaining SOB and generalized weakness since 08/11/2021. Tested positive for COVID-19 on 08/05/2021. Patient admitted for acute respiratory failure secondary to COVID-19 PNA.    # Acute respiratory failure secondary to COVID-19 PNA  - Remains on nasal canula 1-2L NC, maintain sats >92%, cont pulse ox monitoring for now but O2 sats are better  - will recommend continued ambulation  - Continue Decadron 6mg day 7/10   - s/p remdesivir course  - supportive measures, emotional support  - AC with heparin 5000 sq q8   - monitor inflammatory markers as clinically indicated  - ID recs noted and appreciated  - this tx plan was formulated utilizing my clinical judgement, currently available local/regional anecdotal information, organizational treatment recommendations with COVID-19 specific considerations given rapidly changing information available     # SORAIDA on CKD, probably prerenal in the setting of dehydration   - Improving renal function with hydration, Creat 2.8 --> 2.2 -->2.0 -->1.9  -> 1.6 --> 1.6 --> 1.7  - Per chart review, prior creatinine 1.3 in 2017, likely CKDIII   - Nephro (Dr. Vaughan) following, recs appreciated  - labs pending for today. lab was unsuccessful at completing venipuncture todayy    # DM type 2, uncontrolled  - a1c is 10+, uncontrolled at baseline and now on steroids  - coordinating w/ endocrine q daily  - adjust insulin as warranted w/ hypoglycemic protocol in place  Last 24 hours of POC Glucose checks:   POCT Blood Glucose.: 227 mg/dL (08-20-21 @ 07:45)  POCT Blood Glucose.: 284 mg/dL (08-20-21 @ 00:36)  POCT Blood Glucose.: 259 mg/dL (08-19-21 @ 21:54)  POCT Blood Glucose.: 309 mg/dL (08-19-21 @ 16:38)  POCT Blood Glucose.: 217 mg/dL (08-19-21 @ 11:17)  A1C with Estimated Average Glucose Result: 10.6 % (08-14-21 @ 10:18)    Thrombocytopenia, resolved  - Likely reactive in the setting of COVID-19 infection    # Hypertension, chronic  - BPs have intermittently been soft but BP trend is stable last 24hrs  - Holding home Edarbyclor 40-12.5 mg qd  (azilsartan/chlorthalidone) due to SORAIDA     # DVT Prophylaxis   - Heparin 5000 SQ q8

## 2021-08-20 NOTE — PROGRESS NOTE ADULT - ASSESSMENT
SORAIDA/CKD  COVID19 PNA  Acute respiratory failure  DM  HTN      -Cr 1.35 on 2017 suggest CKDIII baseline. Mild increase in Cr on AM labs, continue to monitor.   -SORAIDA clinically pre-renal. Urine studies suggest pre-renal etiology. However, cannot r/o COVID direct renal injury though less likely.  -Avoid excessive IVF with COVID pulmonary status  -No indication for RRT at this time  -Renal indices are improving; monitor trend for now

## 2021-08-21 LAB
ALBUMIN SERPL ELPH-MCNC: 2.1 G/DL — LOW (ref 3.3–5)
ALP SERPL-CCNC: 49 U/L — SIGNIFICANT CHANGE UP (ref 40–120)
ALT FLD-CCNC: 17 U/L — SIGNIFICANT CHANGE UP (ref 12–78)
ANION GAP SERPL CALC-SCNC: 11 MMOL/L — SIGNIFICANT CHANGE UP (ref 5–17)
AST SERPL-CCNC: 16 U/L — SIGNIFICANT CHANGE UP (ref 15–37)
BASOPHILS # BLD AUTO: 0.02 K/UL — SIGNIFICANT CHANGE UP (ref 0–0.2)
BASOPHILS NFR BLD AUTO: 0.2 % — SIGNIFICANT CHANGE UP (ref 0–2)
BILIRUB SERPL-MCNC: 1.2 MG/DL — SIGNIFICANT CHANGE UP (ref 0.2–1.2)
BUN SERPL-MCNC: 35 MG/DL — HIGH (ref 7–23)
CALCIUM SERPL-MCNC: 8.5 MG/DL — SIGNIFICANT CHANGE UP (ref 8.5–10.1)
CHLORIDE SERPL-SCNC: 104 MMOL/L — SIGNIFICANT CHANGE UP (ref 96–108)
CO2 SERPL-SCNC: 20 MMOL/L — LOW (ref 22–31)
CREAT SERPL-MCNC: 1.4 MG/DL — HIGH (ref 0.5–1.3)
EOSINOPHIL # BLD AUTO: 0.07 K/UL — SIGNIFICANT CHANGE UP (ref 0–0.5)
EOSINOPHIL NFR BLD AUTO: 0.7 % — SIGNIFICANT CHANGE UP (ref 0–6)
GLUCOSE SERPL-MCNC: 305 MG/DL — HIGH (ref 70–99)
HCT VFR BLD CALC: 32.2 % — LOW (ref 39–50)
HGB BLD-MCNC: 11.6 G/DL — LOW (ref 13–17)
IMM GRANULOCYTES NFR BLD AUTO: 4.8 % — HIGH (ref 0–1.5)
INR BLD: 1.14 RATIO — SIGNIFICANT CHANGE UP (ref 0.88–1.16)
LYMPHOCYTES # BLD AUTO: 0.57 K/UL — LOW (ref 1–3.3)
LYMPHOCYTES # BLD AUTO: 5.8 % — LOW (ref 13–44)
MAGNESIUM SERPL-MCNC: 2.1 MG/DL — SIGNIFICANT CHANGE UP (ref 1.6–2.6)
MCHC RBC-ENTMCNC: 29.8 PG — SIGNIFICANT CHANGE UP (ref 27–34)
MCHC RBC-ENTMCNC: 36 GM/DL — SIGNIFICANT CHANGE UP (ref 32–36)
MCV RBC AUTO: 82.8 FL — SIGNIFICANT CHANGE UP (ref 80–100)
MONOCYTES # BLD AUTO: 0.31 K/UL — SIGNIFICANT CHANGE UP (ref 0–0.9)
MONOCYTES NFR BLD AUTO: 3.2 % — SIGNIFICANT CHANGE UP (ref 2–14)
NEUTROPHILS # BLD AUTO: 8.31 K/UL — HIGH (ref 1.8–7.4)
NEUTROPHILS NFR BLD AUTO: 85.3 % — HIGH (ref 43–77)
NRBC # BLD: 0 /100 WBCS — SIGNIFICANT CHANGE UP (ref 0–0)
PHOSPHATE SERPL-MCNC: 3.1 MG/DL — SIGNIFICANT CHANGE UP (ref 2.5–4.5)
PLATELET # BLD AUTO: 233 K/UL — SIGNIFICANT CHANGE UP (ref 150–400)
POTASSIUM SERPL-MCNC: 5 MMOL/L — SIGNIFICANT CHANGE UP (ref 3.5–5.3)
POTASSIUM SERPL-SCNC: 5 MMOL/L — SIGNIFICANT CHANGE UP (ref 3.5–5.3)
PROT SERPL-MCNC: 6.2 G/DL — SIGNIFICANT CHANGE UP (ref 6–8.3)
PROTHROM AB SERPL-ACNC: 13.3 SEC — SIGNIFICANT CHANGE UP (ref 10.6–13.6)
RBC # BLD: 3.89 M/UL — LOW (ref 4.2–5.8)
RBC # FLD: 14 % — SIGNIFICANT CHANGE UP (ref 10.3–14.5)
SODIUM SERPL-SCNC: 135 MMOL/L — SIGNIFICANT CHANGE UP (ref 135–145)
WBC # BLD: 9.75 K/UL — SIGNIFICANT CHANGE UP (ref 3.8–10.5)
WBC # FLD AUTO: 9.75 K/UL — SIGNIFICANT CHANGE UP (ref 3.8–10.5)

## 2021-08-21 PROCEDURE — 99232 SBSQ HOSP IP/OBS MODERATE 35: CPT

## 2021-08-21 PROCEDURE — 93010 ELECTROCARDIOGRAM REPORT: CPT

## 2021-08-21 RX ORDER — ALBUTEROL 90 UG/1
2 AEROSOL, METERED ORAL ONCE
Refills: 0 | Status: COMPLETED | OUTPATIENT
Start: 2021-08-21 | End: 2022-07-20

## 2021-08-21 RX ADMIN — HEPARIN SODIUM 5000 UNIT(S): 5000 INJECTION INTRAVENOUS; SUBCUTANEOUS at 05:06

## 2021-08-21 RX ADMIN — Medication 6 UNIT(S): at 07:59

## 2021-08-21 RX ADMIN — HEPARIN SODIUM 5000 UNIT(S): 5000 INJECTION INTRAVENOUS; SUBCUTANEOUS at 12:06

## 2021-08-21 RX ADMIN — Medication 8: at 07:58

## 2021-08-21 RX ADMIN — Medication 4: at 17:09

## 2021-08-21 RX ADMIN — Medication 8: at 12:06

## 2021-08-21 RX ADMIN — HEPARIN SODIUM 5000 UNIT(S): 5000 INJECTION INTRAVENOUS; SUBCUTANEOUS at 21:15

## 2021-08-21 RX ADMIN — Medication 6 UNIT(S): at 17:09

## 2021-08-21 RX ADMIN — Medication 1: at 21:16

## 2021-08-21 RX ADMIN — Medication 6 UNIT(S): at 12:07

## 2021-08-21 RX ADMIN — INSULIN GLARGINE 6 UNIT(S): 100 INJECTION, SOLUTION SUBCUTANEOUS at 21:16

## 2021-08-21 RX ADMIN — INSULIN GLARGINE 6 UNIT(S): 100 INJECTION, SOLUTION SUBCUTANEOUS at 07:58

## 2021-08-21 RX ADMIN — Medication 6 MILLIGRAM(S): at 05:06

## 2021-08-21 NOTE — PROGRESS NOTE ADULT - SUBJECTIVE AND OBJECTIVE BOX
Name: MARIA LUZ ROCK  MRN: [id number]  LOCATION: [address]    ----  Patient is a 61y old  Male who presents with a chief complaint of acute hypoxic respiratory failure (21 Aug 2021 11:46)      FROM ADMISSION H+P:   HPI:  60 yo Male patient with PMHx of HTN, DM type 2 (insulin dependent) presents today complaining SOB and generalized weakness since 08/11/2021. Pt endorses that came back from [place] on August 5th and tested positive for COVID-19, states he was in contact with a friend that tested positive. Since 08/11/2021 has been progressively worsening  generalized weakness and exertional dyspnea, associated with cough, decreased appetite with poor oral intake, decreases smell and one episode of vomiting today. Patient before was able to walk long distances, climb stairs w/o dyspna    ----  INTERVAL HPI/OVERNIGHT EVENTS: Pt seen and evaluated at the bedside. No acute overnight events occurred. Pt feels completely exhausted and is having a hard time ambulating. He reports weakness, shortness of breath, dyspnea, cough, lightheadedness w/ ambulation. No ohter new complaints at this time.     ----  PAST MEDICAL & SURGICAL HISTORY:  Hypertension    Diabetes mellitus    No significant past surgical history        FAMILY HISTORY:  FH: HTN (hypertension) (Father, Mother)        Allergies    calcium channel blockers (Other)    Intolerances        ----  ANTIMICROBIALS:    CARDIOVASCULAR:    GASTROINTESTINAL:    PULMONARY:      ----  REVIEW OF SYSTEMS:  comprehensive ROS as per HPI     ----  PHYSICAL EXAM:  GENERAL: patient appears generally uncomfortable, appears fatigued  ENMT: oropharynx clear without erythema, moist mucous membranes  LUNGS: reduced air entry bilaterally, coarse breath sounds throughout, symmetric breath sounds, no wheezing or rhonchi appreciated  HEART: soft S1/S2, regular rate and rhythm, no murmurs noted, no noted edema to b/l LE  GASTROINTESTINAL: abdomen is soft, nontender, nondistended, normoactive bowel sounds, no palpable masses  MUSCULOSKELETAL: no clubbing or cyanosis, no obvious deformity, no calf tenderness to palpation b/l  NEUROLOGIC: awake, alert, readily interactive, good muscle tone in 4 extremities    T(C): 36.7 (08-21-21 @ 20:06), Max: 36.7 (08-21-21 @ 05:15)  HR: 55 (08-21-21 @ 20:06) (46 - 77)  BP: 125/76 (08-21-21 @ 20:06) (108/71 - 125/76)  RR: 18 (08-21-21 @ 20:06) (18 - 18)  SpO2: 95% (08-21-21 @ 21:08) (91% - 95%)  Wt(kg): --    ----  INTAKE & OUTPUT:  I&O's Summary    20 Aug 2021 07:01  -  21 Aug 2021 07:00  --------------------------------------------------------  IN: 300 mL / OUT: 0 mL / NET: 300 mL        LABS:                        11.6   9.75  )-----------( 233      ( 21 Aug 2021 08:27 )             32.2     08-21    135  |  104  |  35<H>  ----------------------------<  305<H>  5.0   |  20<L>  |  1.40<H>    Ca    8.5      21 Aug 2021 08:27  Phos  3.1     08-21  Mg     2.1     08-21    TPro  6.2  /  Alb  2.1<L>  /  TBili  1.2  /  DBili  .20  /  AST  16  /  ALT  17  /  AlkPhos  49  08-21    PT/INR - ( 21 Aug 2021 08:27 )   PT: 13.3 sec;   INR: 1.14 ratio             CAPILLARY BLOOD GLUCOSE      POCT Blood Glucose.: 256 mg/dL (21 Aug 2021 21:13)  POCT Blood Glucose.: 235 mg/dL (21 Aug 2021 16:26)  POCT Blood Glucose.: 329 mg/dL (21 Aug 2021 11:42)  POCT Blood Glucose.: 350 mg/dL (21 Aug 2021 07:44)

## 2021-08-21 NOTE — PROVIDER CONTACT NOTE (OTHER) - ACTION/TREATMENT ORDERED:
No new orders given.
Dr. Dodson aware. orders to follow. will continue to assess and monitor
Seen by MD, to increase O2 to 3LNC, albuterol inhaler

## 2021-08-21 NOTE — PROVIDER CONTACT NOTE (OTHER) - SITUATION
Pt saturating at 86% on RA, c/o sob and chest discomfort. Pt placed on 2LNC and positioned on his side with improvement in sats but continues to feel short of breath.

## 2021-08-21 NOTE — CHART NOTE - NSCHARTNOTEFT_GEN_A_CORE
Notified by RN that patient desatted to 86% on room air after he ambulated to the bathroom. Patient placed on 2 L NC, now satting well, appears comfortable while sitting in bed. Denies any acute complaints, states that he feels better on the oxygen.     T(C): 36.7 (08-21-21 @ 20:06), Max: 36.7 (08-21-21 @ 05:15)  HR: 55 (08-21-21 @ 20:06) (46 - 77)  BP: 125/76 (08-21-21 @ 20:06) (108/71 - 125/76)  RR: 18 (08-21-21 @ 20:06) (18 - 18)  SpO2: 95% (08-21-21 @ 21:08) (91% - 95%)    GENERAL: in no acute distress, on 2 L NC   LUNGS: reduced air entry bilaterally, coarse breath sounds   HEART: soft S1/S2, regular rate and rhythm, no murmurs noted, no lower extremity edema  GASTROINTESTINAL: abdomen is soft, nontender, nondistended, normoactive bowel sounds, no palpable masses  MUSCULOSKELETAL: no clubbing or cyanosis, no obvious deformity  NEUROLOGIC: awake, alert, oriented x3    Will continue to monitor sats on 2L NC. Titrate supplemental oxygen as tolerated. RN to call if any changes. Notified by RN that patient desatted to 86% on room air after he ambulated to the bathroom. Patient placed on 2 L NC, now satting well, appears comfortable while sitting in bed. Denies any acute complaints, states that he feels better on the oxygen.     T(C): 36.7 (08-21-21 @ 20:06), Max: 36.7 (08-21-21 @ 05:15)  HR: 55 (08-21-21 @ 20:06) (46 - 77)  BP: 125/76 (08-21-21 @ 20:06) (108/71 - 125/76)  RR: 18 (08-21-21 @ 20:06) (18 - 18)  SpO2: 95% (08-21-21 @ 21:08) (91% - 95%)    GENERAL: in no acute distress, on 2 L NC   LUNGS: reduced air entry bilaterally, coarse breath sounds   HEART: soft S1/S2, regular rate and rhythm, no murmurs noted, no lower extremity edema  GASTROINTESTINAL: abdomen is soft, nontender, nondistended, normoactive bowel sounds, no palpable masses  MUSCULOSKELETAL: no clubbing or cyanosis, no obvious deformity  NEUROLOGIC: awake, alert, oriented x3    Will continue to monitor sats on 2L NC. Titrate supplemental oxygen as tolerated. RN to call if any changes.    23:37 Addendum: Called by RN patient reporting feeling SOB and chest pain. Chest pain has been same pain pt has experiencing during the day, workup including EKG and cardiac enzymes negative.  Patient O2 sat 88% on 2L while sitting up and high 90s while laying on his side. On exam patient with inspiratory and expiratory wheezing. Will order pro BNP in am.

## 2021-08-21 NOTE — PROVIDER CONTACT NOTE (OTHER) - ASSESSMENT
Pt alert and oriented. SpO2 88% sitting, high 90's side-lying but pt states he feels more comfortable sitting up.
NAD noted.

## 2021-08-21 NOTE — PROGRESS NOTE ADULT - SUBJECTIVE AND OBJECTIVE BOX
Patient is a 61y old  Male who presents with a chief complaint of acute hypoxic respiratory failure (13 Aug 2021 19:56)       HPI:  62 yo Male patient with PMHx of HTN, DM type 2 (insulin dependent) presents today complaining SOB and generalized weakness since 08/11/2021. Pt endorses that came back from [place] on August 5th and tested positive for COVID-19, states he was in contact with a friend that tested positive. Since 08/11/2021 has been progressively worsening  generalized weakness and exertional dyspnea, associated with cough, decreased appetite with poor oral intake, decreases smell and one episode of vomiting today. Patient before was able to walk long distances, climb stairs w/o dyspna  At this time he denies  fever, chills, chest pain, palpitations, abdominal pain, diarrhea, constipation, urinary frequency, urgency, hematuria, hematochezia, melena or dysuria, changes in vision, dizziness, numbness, tingling. He is not vaccinated against covid19  Renal is being consulted for SORAIDA/CKD. No urinary c/o.     No acute events noted    INTERVAL HISTORY:   No acute overnight events. Pt with no urinary or other complaints this morning. States that SOB is improving.     PAST MEDICAL & SURGICAL HISTORY:  Hypertension    Diabetes mellitus    No significant past surgical history         FAMILY HISTORY:  FH: HTN (hypertension) (Father, Mother)    NC    Social History:Non smoker    MEDICATIONS  (STANDING):  dexAMETHasone     Tablet 6 milliGRAM(s) Oral daily  dextrose 40% Gel 15 Gram(s) Oral once  dextrose 5%. 1000 milliLiter(s) (50 mL/Hr) IV Continuous <Continuous>  dextrose 5%. 1000 milliLiter(s) (100 mL/Hr) IV Continuous <Continuous>  dextrose 50% Injectable 25 Gram(s) IV Push once  dextrose 50% Injectable 12.5 Gram(s) IV Push once  dextrose 50% Injectable 25 Gram(s) IV Push once  glucagon  Injectable 1 milliGRAM(s) IntraMuscular once  heparin   Injectable 5000 Unit(s) SubCutaneous every 8 hours  insulin glargine Injectable (LANTUS) 6 Unit(s) SubCutaneous every morning  insulin glargine Injectable (LANTUS) 6 Unit(s) SubCutaneous at bedtime  insulin lispro (ADMELOG) corrective regimen sliding scale   SubCutaneous at bedtime  insulin lispro (ADMELOG) corrective regimen sliding scale   SubCutaneous three times a day before meals  insulin lispro Injectable (ADMELOG) 6 Unit(s) SubCutaneous three times a day before meals    MEDICATIONS  (PRN):  acetaminophen   Tablet .. 650 milliGRAM(s) Oral every 6 hours PRN Temp greater or equal to 38.5C (101.3F), Mild Pain (1 - 3)  ondansetron Injectable 4 milliGRAM(s) IV Push every 8 hours PRN Nausea and/or Vomiting    Allergies    calcium channel blockers (Other)    Intolerances     REVIEW OF SYSTEMS:    CONSTITUTIONAL:  No weight loss   EYES: No eye pain, visual disturbances, or discharge  ENMT:  No difficulty hearing, tinnitus, vertigo; No sinus or throat pain  NECK: No pain or stiffness  RESPIRATORY: +SOB; No wheeze.  CARDIOVASCULAR: No chest pain, palpitations, dizziness,   GASTROINTESTINAL: No abdominal or epigastric pain. No nausea, vomiting, or hematemesis; No diarrhea or constipation. No melena or hematochezia.  GENITOURINARY: No dysuria, frequency, hematuria, or incontinence  NEUROLOGICAL: No headaches, memory loss, loss of strength, numbness, or tremors  LYMPH NODES: No enlarged glands  MUSCULOSKELETAL: No joint pain or swelling     Vital Signs Last 24 Hrs  T(C): 36.7 (21 Aug 2021 05:15), Max: 37.1 (20 Aug 2021 13:19)  T(F): 98 (21 Aug 2021 05:15), Max: 98.8 (20 Aug 2021 13:19)  HR: 77 (21 Aug 2021 09:00) (55 - 77)  BP: 110/73 (21 Aug 2021 05:15) (100/65 - 122/74)  BP(mean): --  RR: 18 (21 Aug 2021 05:15) (17 - 18)  SpO2: 91% (21 Aug 2021 09:00) (91% - 94%)    Physical exam:  Exam deferred due to COVID 19 isolation and reduce risk of transmission    LABS:                                                    11.6   9.75  )-----------( 233      ( 21 Aug 2021 08:27 )             32.2     08-21    135  |  104  |  35<H>  ----------------------------<  305<H>  5.0   |  20<L>  |  1.40<H>    Ca    8.5      21 Aug 2021 08:27  Phos  3.1     08-21  Mg     2.1     08-21    TPro  6.2  /  Alb  2.1<L>  /  TBili  1.2  /  DBili  .20  /  AST  16  /  ALT  17  /  AlkPhos  49  08-21    PT/INR - ( 21 Aug 2021 08:27 )   PT: 13.3 sec;   INR: 1.14 ratio

## 2021-08-21 NOTE — PROGRESS NOTE ADULT - ASSESSMENT
SORAIDA/CKD  COVID19 PNA  Acute respiratory failure  DM  HTN      -Cr 1.35 on 2017 suggest CKDIII baseline. Mild increase in Cr on AM labs, continue to monitor.   -SORAIDA clinically pre-renal. Urine studies suggest pre-renal etiology. However, cannot r/o COVID direct renal injury though less likely.  -Avoid excessive IVF with COVID pulmonary status  -No indication for RRT at this time  -Renal indices are improving further now near baseline; monitor trend for now

## 2021-08-21 NOTE — PROGRESS NOTE ADULT - ASSESSMENT
60 yo male with PMHs of HTN and T2DM (insulin dependent) presented to the ED complaining SOB and generalized weakness since 08/11/2021. Tested positive for COVID-19 on 08/05/2021. Patient admitted for acute respiratory failure secondary to COVID-19 PNA.    # Acute respiratory failure secondary to COVID-19 PNA  - now on room air  - will recommend continued ambulation  - Continue Decadron 6mg day 8/10   - s/p remdesivir course  - supportive measures, emotional support  - AC with heparin 5000 sq q8   - monitor inflammatory markers as clinically indicated  - ID recs noted and appreciated  - this tx plan was formulated utilizing my clinical judgement, currently available local/regional anecdotal information, organizational treatment recommendations with COVID-19 specific considerations given rapidly changing information available     # SORAIDA on CKD, probably prerenal in the setting of dehydration   - Improving renal function  - Per chart review, prior creatinine 1.3 in 2017, likely CKDIII   - Nephro (Dr. Vaughan) following, recs appreciated     # DM type 2, uncontrolled  - a1c is 10+, uncontrolled at baseline and now on steroids  - coordinating w/ endocrine q daily  - adjust insulin as warranted w/ hypoglycemic protocol in place  Last 24 hours of POC Glucose checks:   POCT Blood Glucose.: 256 mg/dL (08-21-21 @ 21:13)  POCT Blood Glucose.: 235 mg/dL (08-21-21 @ 16:26)  POCT Blood Glucose.: 329 mg/dL (08-21-21 @ 11:42)  POCT Blood Glucose.: 350 mg/dL (08-21-21 @ 07:44)  A1C with Estimated Average Glucose Result: 10.6 % (08-14-21 @ 10:18)    Thrombocytopenia, resolved  - Likely reactive in the setting of COVID-19 infection    # Hypertension, chronic  - BPs have intermittently been soft but BP trend is stable last 24hrs  - Holding home Edarbyclor 40-12.5 mg qd  (azilsartan/chlorthalidone) due to SORAIDA     # DVT Prophylaxis   - Heparin 5000 SQ q8

## 2021-08-22 LAB
ALBUMIN SERPL ELPH-MCNC: 2 G/DL — LOW (ref 3.3–5)
ALBUMIN SERPL ELPH-MCNC: 2.1 G/DL — LOW (ref 3.3–5)
ALP SERPL-CCNC: 49 U/L — SIGNIFICANT CHANGE UP (ref 40–120)
ALP SERPL-CCNC: 50 U/L — SIGNIFICANT CHANGE UP (ref 40–120)
ALT FLD-CCNC: 17 U/L — SIGNIFICANT CHANGE UP (ref 12–78)
ALT FLD-CCNC: 18 U/L — SIGNIFICANT CHANGE UP (ref 12–78)
ANION GAP SERPL CALC-SCNC: 8 MMOL/L — SIGNIFICANT CHANGE UP (ref 5–17)
AST SERPL-CCNC: 14 U/L — LOW (ref 15–37)
AST SERPL-CCNC: 14 U/L — LOW (ref 15–37)
BASOPHILS # BLD AUTO: 0.02 K/UL — SIGNIFICANT CHANGE UP (ref 0–0.2)
BASOPHILS NFR BLD AUTO: 0.2 % — SIGNIFICANT CHANGE UP (ref 0–2)
BILIRUB DIRECT SERPL-MCNC: 0.2 MG/DL — SIGNIFICANT CHANGE UP (ref 0.05–0.2)
BILIRUB INDIRECT FLD-MCNC: 0.7 MG/DL — SIGNIFICANT CHANGE UP (ref 0.2–1)
BILIRUB SERPL-MCNC: 0.7 MG/DL — SIGNIFICANT CHANGE UP (ref 0.2–1.2)
BILIRUB SERPL-MCNC: 0.9 MG/DL — SIGNIFICANT CHANGE UP (ref 0.2–1.2)
BUN SERPL-MCNC: 29 MG/DL — HIGH (ref 7–23)
CALCIUM SERPL-MCNC: 8.8 MG/DL — SIGNIFICANT CHANGE UP (ref 8.5–10.1)
CHLORIDE SERPL-SCNC: 107 MMOL/L — SIGNIFICANT CHANGE UP (ref 96–108)
CO2 SERPL-SCNC: 21 MMOL/L — LOW (ref 22–31)
CREAT SERPL-MCNC: 1.5 MG/DL — HIGH (ref 0.5–1.3)
EOSINOPHIL # BLD AUTO: 0.08 K/UL — SIGNIFICANT CHANGE UP (ref 0–0.5)
EOSINOPHIL NFR BLD AUTO: 0.9 % — SIGNIFICANT CHANGE UP (ref 0–6)
GLUCOSE SERPL-MCNC: 297 MG/DL — HIGH (ref 70–99)
HCT VFR BLD CALC: 33.6 % — LOW (ref 39–50)
HGB BLD-MCNC: 11.7 G/DL — LOW (ref 13–17)
IMM GRANULOCYTES NFR BLD AUTO: 5.5 % — HIGH (ref 0–1.5)
INR BLD: 1.14 RATIO — SIGNIFICANT CHANGE UP (ref 0.88–1.16)
LYMPHOCYTES # BLD AUTO: 0.6 K/UL — LOW (ref 1–3.3)
LYMPHOCYTES # BLD AUTO: 6.5 % — LOW (ref 13–44)
MAGNESIUM SERPL-MCNC: 1.9 MG/DL — SIGNIFICANT CHANGE UP (ref 1.6–2.6)
MCHC RBC-ENTMCNC: 29.2 PG — SIGNIFICANT CHANGE UP (ref 27–34)
MCHC RBC-ENTMCNC: 34.8 GM/DL — SIGNIFICANT CHANGE UP (ref 32–36)
MCV RBC AUTO: 83.8 FL — SIGNIFICANT CHANGE UP (ref 80–100)
MONOCYTES # BLD AUTO: 0.26 K/UL — SIGNIFICANT CHANGE UP (ref 0–0.9)
MONOCYTES NFR BLD AUTO: 2.8 % — SIGNIFICANT CHANGE UP (ref 2–14)
NEUTROPHILS # BLD AUTO: 7.74 K/UL — HIGH (ref 1.8–7.4)
NEUTROPHILS NFR BLD AUTO: 84.1 % — HIGH (ref 43–77)
NRBC # BLD: 0 /100 WBCS — SIGNIFICANT CHANGE UP (ref 0–0)
NT-PROBNP SERPL-SCNC: 290 PG/ML — HIGH (ref 0–125)
PHOSPHATE SERPL-MCNC: 3.1 MG/DL — SIGNIFICANT CHANGE UP (ref 2.5–4.5)
PLATELET # BLD AUTO: 232 K/UL — SIGNIFICANT CHANGE UP (ref 150–400)
POTASSIUM SERPL-MCNC: 5.4 MMOL/L — HIGH (ref 3.5–5.3)
POTASSIUM SERPL-SCNC: 5.4 MMOL/L — HIGH (ref 3.5–5.3)
PROT SERPL-MCNC: 6.2 G/DL — SIGNIFICANT CHANGE UP (ref 6–8.3)
PROT SERPL-MCNC: 6.3 G/DL — SIGNIFICANT CHANGE UP (ref 6–8.3)
PROTHROM AB SERPL-ACNC: 13.2 SEC — SIGNIFICANT CHANGE UP (ref 10.6–13.6)
RBC # BLD: 4.01 M/UL — LOW (ref 4.2–5.8)
RBC # FLD: 14.2 % — SIGNIFICANT CHANGE UP (ref 10.3–14.5)
SODIUM SERPL-SCNC: 136 MMOL/L — SIGNIFICANT CHANGE UP (ref 135–145)
WBC # BLD: 9.21 K/UL — SIGNIFICANT CHANGE UP (ref 3.8–10.5)
WBC # FLD AUTO: 9.21 K/UL — SIGNIFICANT CHANGE UP (ref 3.8–10.5)

## 2021-08-22 PROCEDURE — 99233 SBSQ HOSP IP/OBS HIGH 50: CPT

## 2021-08-22 RX ORDER — SODIUM ZIRCONIUM CYCLOSILICATE 10 G/10G
10 POWDER, FOR SUSPENSION ORAL ONCE
Refills: 0 | Status: COMPLETED | OUTPATIENT
Start: 2021-08-22 | End: 2021-08-22

## 2021-08-22 RX ORDER — INSULIN GLARGINE 100 [IU]/ML
10 INJECTION, SOLUTION SUBCUTANEOUS EVERY MORNING
Refills: 0 | Status: DISCONTINUED | OUTPATIENT
Start: 2021-08-23 | End: 2021-08-23

## 2021-08-22 RX ORDER — INSULIN GLARGINE 100 [IU]/ML
10 INJECTION, SOLUTION SUBCUTANEOUS AT BEDTIME
Refills: 0 | Status: DISCONTINUED | OUTPATIENT
Start: 2021-08-22 | End: 2021-08-23

## 2021-08-22 RX ORDER — ALBUTEROL 90 UG/1
2 AEROSOL, METERED ORAL ONCE
Refills: 0 | Status: COMPLETED | OUTPATIENT
Start: 2021-08-22 | End: 2021-08-22

## 2021-08-22 RX ADMIN — ALBUTEROL 2 PUFF(S): 90 AEROSOL, METERED ORAL at 01:04

## 2021-08-22 RX ADMIN — Medication 6 UNIT(S): at 07:57

## 2021-08-22 RX ADMIN — Medication 4: at 16:54

## 2021-08-22 RX ADMIN — HEPARIN SODIUM 5000 UNIT(S): 5000 INJECTION INTRAVENOUS; SUBCUTANEOUS at 13:53

## 2021-08-22 RX ADMIN — Medication 6 UNIT(S): at 11:58

## 2021-08-22 RX ADMIN — Medication 6 MILLIGRAM(S): at 05:09

## 2021-08-22 RX ADMIN — HEPARIN SODIUM 5000 UNIT(S): 5000 INJECTION INTRAVENOUS; SUBCUTANEOUS at 21:14

## 2021-08-22 RX ADMIN — SODIUM ZIRCONIUM CYCLOSILICATE 10 GRAM(S): 10 POWDER, FOR SUSPENSION ORAL at 13:53

## 2021-08-22 RX ADMIN — Medication 6: at 07:56

## 2021-08-22 RX ADMIN — INSULIN GLARGINE 10 UNIT(S): 100 INJECTION, SOLUTION SUBCUTANEOUS at 21:22

## 2021-08-22 RX ADMIN — Medication 6 UNIT(S): at 16:54

## 2021-08-22 RX ADMIN — Medication 1: at 21:15

## 2021-08-22 RX ADMIN — HEPARIN SODIUM 5000 UNIT(S): 5000 INJECTION INTRAVENOUS; SUBCUTANEOUS at 05:09

## 2021-08-22 RX ADMIN — Medication 8: at 11:57

## 2021-08-22 RX ADMIN — INSULIN GLARGINE 6 UNIT(S): 100 INJECTION, SOLUTION SUBCUTANEOUS at 07:57

## 2021-08-22 NOTE — PROGRESS NOTE ADULT - SUBJECTIVE AND OBJECTIVE BOX
Name: MARIA LUZ ROCK  MRN: [id number]  LOCATION: [address]    ----  Patient is a 61y old  Male who presents with a chief complaint of acute hypoxic respiratory failure (21 Aug 2021 21:18)      FROM ADMISSION H+P:   HPI:  60 yo Male patient with PMHx of HTN, DM type 2 (insulin dependent) presents today complaining SOB and generalized weakness since 08/11/2021. Pt endorses that came back from [place] on August 5th and tested positive for COVID-19, states he was in contact with a friend that tested positive. Since 08/11/2021 has been progressively worsening  generalized weakness and exertional dyspnea, associated with cough, decreased appetite with poor oral intake, decreases smell and one episode of vomiting today. Patient before was able to walk long distances, climb stairs w/o dyspnea    ----  INTERVAL HPI/OVERNIGHT EVENTS: Pt seen and evaluated at the bedside. No acute overnight events occurred. The patient reports persistent weakness, fatigue, intermittent chest pain. Pt reports dyspnea and cough. Worse w/ ambulation. Pt has persistent weakness. Pt has joint pains and stiffness. He has vague abd discomfort and achiness w/ reduced appetite and reduced po intake. We discussed importance of nutrition and hydration and he is agreeable w/ tx plan. He was increased to 3L NC overnight due to hypoxia in mid-80's.     ----  PAST MEDICAL & SURGICAL HISTORY:  Hypertension    Diabetes mellitus    No significant past surgical history        FAMILY HISTORY:  FH: HTN (hypertension) (Father, Mother)        Allergies    calcium channel blockers (Other)    Intolerances        ----  ANTIMICROBIALS:    CARDIOVASCULAR:    GASTROINTESTINAL:    PULMONARY:      ----  REVIEW OF SYSTEMS:  comprehensive ROS as per HPI     ----  PHYSICAL EXAM:  GENERAL: patient appears generally uncomfortable, appears fatigued  ENMT: oropharynx clear without erythema, dry mucous membranes  LUNGS: reduced air entry bilaterally, coarse breath sounds throughout, no wheezing  HEART: soft S1/S2, regular rate and rhythm, no murmurs noted, no noted edema to b/l LE  GASTROINTESTINAL: abdomen is soft, nontender, nondistended, normoactive bowel sounds, no palpable masses  MUSCULOSKELETAL: no clubbing or cyanosis, no obvious deformity, no calf tenderness to palpation b/l  NEUROLOGIC: awake, alert, readily interactive, good muscle tone in 4 extremities    T(C): 36.7 (08-22-21 @ 05:33), Max: 36.7 (08-21-21 @ 20:06)  HR: 56 (08-22-21 @ 05:33) (46 - 62)  BP: 139/87 (08-22-21 @ 05:33) (108/71 - 139/87)  RR: 18 (08-21-21 @ 20:06) (18 - 18)  SpO2: 97% (08-22-21 @ 05:33) (86% - 97%)  Wt(kg): --    ----  INTAKE & OUTPUT:  I&O's Summary    21 Aug 2021 07:01  -  22 Aug 2021 07:00  --------------------------------------------------------  IN: 0 mL / OUT: 1400 mL / NET: -1400 mL    22 Aug 2021 07:01  -  22 Aug 2021 09:28  --------------------------------------------------------  IN: 0 mL / OUT: 400 mL / NET: -400 mL        LABS:                        11.6   9.75  )-----------( 233      ( 21 Aug 2021 08:27 )             32.2     08-21    135  |  104  |  35<H>  ----------------------------<  305<H>  5.0   |  20<L>  |  1.40<H>    Ca    8.5      21 Aug 2021 08:27  Phos  3.1     08-21  Mg     2.1     08-21    TPro  6.2  /  Alb  2.1<L>  /  TBili  1.2  /  DBili  .20  /  AST  16  /  ALT  17  /  AlkPhos  49  08-21    PT/INR - ( 21 Aug 2021 08:27 )   PT: 13.3 sec;   INR: 1.14 ratio             CAPILLARY BLOOD GLUCOSE      POCT Blood Glucose.: 281 mg/dL (22 Aug 2021 07:24)  POCT Blood Glucose.: 256 mg/dL (21 Aug 2021 21:13)  POCT Blood Glucose.: 235 mg/dL (21 Aug 2021 16:26)  POCT Blood Glucose.: 329 mg/dL (21 Aug 2021 11:42)            ----  COVID specific labs:    Auto Neutrophil #: 8.31 K/uL (08-21-21 @ 08:27)  Auto Neutrophil #: 8.42 K/uL (08-20-21 @ 10:03)  Auto Neutrophil #: 8.42 K/uL (08-19-21 @ 08:44)  Auto Neutrophil #: 5.06 K/uL (08-14-21 @ 06:09)  Auto Neutrophil #: 4.46 K/uL (08-13-21 @ 18:13)    Auto Lymphocyte #: 0.57 K/uL (08-21-21 @ 08:27)  Auto Lymphocyte #: 0.39 K/uL (08-20-21 @ 10:03)  Auto Lymphocyte #: 0.37 K/uL (08-19-21 @ 08:44)  Auto Lymphocyte #: 0.53 K/uL (08-14-21 @ 06:09)  Auto Lymphocyte #: 0.56 K/uL (08-13-21 @ 18:13)    Procalcitonin, Serum: <0.05 (08-21-21 @ 08:27)  Procalcitonin, Serum: 0.12 ng/mL (08-20-21 @ 10:05)  Procalcitonin, Serum: 0.09 ng/mL (08-18-21 @ 11:00)  Procalcitonin, Serum: 0.41 ng/mL (08-13-21 @ 18:32)    Ferritin, Serum: 732 ng/mL (08-21-21 @ 14:13)  Ferritin, Serum: 959 ng/mL (08-20-21 @ 17:53)  Ferritin, Serum: 971 ng/mL (08-18-21 @ 17:02)  Ferritin, Serum: 1084 ng/mL (08-14-21 @ 01:40)      D-Dimer Assay, Quantitative: 934 ng/mL DDU (08-21-21 @ 08:27)  D-Dimer Assay, Quantitative: 1214 ng/mL DDU (08-20-21 @ 10:05)  D-Dimer Assay, Quantitative: 747 ng/mL DDU (08-18-21 @ 11:00)  D-Dimer Assay, Quantitative: 323 ng/mL DDU (08-13-21 @ 18:13)        Creatine Kinase, Serum: 53 U/L (08-21-21 @ 08:27)      CPK Mass Assay %: <1.9 % (08-21-21 @ 08:27)    Troponin I, Serum: <.015 ng/mL (08-21-21 @ 08:27)    Lactate, Blood: 1.7 mmol/L (08-13-21 @ 22:04)  Lactate, Blood: 2.3 mmol/L (08-13-21 @ 18:13)    Prothrombin Time, Plasma: 13.3 sec (08-21-21 @ 08:27)  Prothrombin Time, Plasma: 13.7 sec (08-20-21 @ 10:05)  Prothrombin Time, Plasma: 13.7 sec (08-19-21 @ 08:44)  Prothrombin Time, Plasma: 13.5 sec (08-18-21 @ 11:00)  Prothrombin Time, Plasma: 12.8 sec (08-17-21 @ 09:13)  Prothrombin Time, Plasma: 12.2 sec (08-16-21 @ 09:16)  Prothrombin Time, Plasma: 12.8 sec (08-15-21 @ 18:40)        ----  Personally reviewed:  Vital sign trends: [ x ] yes    [  ] no     [  ] n/a  Laboratory results: [ x ] yes    [  ] no     [  ] n/a  Radiology results: [ x ] yes    [  ] no     [  ] n/a  Microbio results: [ x ] yes    [  ] no     [  ] n/a  Consultant recommendations: [ x ] yes    [  ] no     [  ] n/a

## 2021-08-22 NOTE — PROGRESS NOTE ADULT - SUBJECTIVE AND OBJECTIVE BOX
Patient is a 61y old  Male who presents with a chief complaint of acute hypoxic respiratory failure (13 Aug 2021 19:56)       HPI:  60 yo Male patient with PMHx of HTN, DM type 2 (insulin dependent) presents today complaining SOB and generalized weakness since 08/11/2021. Pt endorses that came back from [place] on August 5th and tested positive for COVID-19, states he was in contact with a friend that tested positive. Since 08/11/2021 has been progressively worsening  generalized weakness and exertional dyspnea, associated with cough, decreased appetite with poor oral intake, decreases smell and one episode of vomiting today. Patient before was able to walk long distances, climb stairs w/o dyspna  At this time he denies  fever, chills, chest pain, palpitations, abdominal pain, diarrhea, constipation, urinary frequency, urgency, hematuria, hematochezia, melena or dysuria, changes in vision, dizziness, numbness, tingling. He is not vaccinated against covid19  Renal is being consulted for SORAIDA/CKD. No urinary c/o.     No acute events noted    INTERVAL HISTORY:   No acute overnight events. Pt with no urinary or other complaints this morning. States that SOB is improving.     PAST MEDICAL & SURGICAL HISTORY:  Hypertension    Diabetes mellitus    No significant past surgical history         FAMILY HISTORY:  FH: HTN (hypertension) (Father, Mother)    NC    Social History:Non smoker    MEDICATIONS  (STANDING):  dexAMETHasone     Tablet 6 milliGRAM(s) Oral daily  dextrose 40% Gel 15 Gram(s) Oral once  dextrose 5%. 1000 milliLiter(s) (50 mL/Hr) IV Continuous <Continuous>  dextrose 5%. 1000 milliLiter(s) (100 mL/Hr) IV Continuous <Continuous>  dextrose 50% Injectable 25 Gram(s) IV Push once  dextrose 50% Injectable 12.5 Gram(s) IV Push once  dextrose 50% Injectable 25 Gram(s) IV Push once  glucagon  Injectable 1 milliGRAM(s) IntraMuscular once  heparin   Injectable 5000 Unit(s) SubCutaneous every 8 hours  insulin glargine Injectable (LANTUS) 6 Unit(s) SubCutaneous every morning  insulin glargine Injectable (LANTUS) 6 Unit(s) SubCutaneous at bedtime  insulin lispro (ADMELOG) corrective regimen sliding scale   SubCutaneous at bedtime  insulin lispro (ADMELOG) corrective regimen sliding scale   SubCutaneous three times a day before meals  insulin lispro Injectable (ADMELOG) 6 Unit(s) SubCutaneous three times a day before meals    MEDICATIONS  (PRN):  acetaminophen   Tablet .. 650 milliGRAM(s) Oral every 6 hours PRN Temp greater or equal to 38.5C (101.3F), Mild Pain (1 - 3)  ondansetron Injectable 4 milliGRAM(s) IV Push every 8 hours PRN Nausea and/or Vomiting    Allergies    calcium channel blockers (Other)    Intolerances     REVIEW OF SYSTEMS:    CONSTITUTIONAL:  No weight loss   EYES: No eye pain, visual disturbances, or discharge  ENMT:  No difficulty hearing, tinnitus, vertigo; No sinus or throat pain  NECK: No pain or stiffness  RESPIRATORY: no SOB; No wheeze.  CARDIOVASCULAR: No chest pain, palpitations, dizziness,   GASTROINTESTINAL: No abdominal or epigastric pain. No nausea, vomiting, or hematemesis; No diarrhea or constipation. No melena or hematochezia.  GENITOURINARY: No dysuria, frequency, hematuria, or incontinence  NEUROLOGICAL: No headaches, memory loss, loss of strength, numbness, or tremors  LYMPH NODES: No enlarged glands  MUSCULOSKELETAL: No joint pain or swelling     Vital Signs Last 24 Hrs  T(C): 36.7 (22 Aug 2021 05:33), Max: 36.7 (21 Aug 2021 20:06)  T(F): 98 (22 Aug 2021 05:33), Max: 98 (21 Aug 2021 20:06)  HR: 56 (22 Aug 2021 05:33) (46 - 62)  BP: 139/87 (22 Aug 2021 05:33) (108/71 - 139/87)  BP(mean): --  RR: 18 (21 Aug 2021 20:06) (18 - 18)  SpO2: 97% (22 Aug 2021 05:33) (86% - 97%)    Physical exam:  Exam deferred due to COVID 19 isolation and reduce risk of transmission    LABS:                                                                        11.7   9.21  )-----------( 232      ( 22 Aug 2021 09:51 )             33.6     08-22    136  |  107  |  29<H>  ----------------------------<  297<H>  5.4<H>   |  21<L>  |  1.50<H>    Ca    8.8      22 Aug 2021 09:51  Phos  3.1     08-21  Mg     1.9     08-22    TPro  6.2  /  Alb  2.0<L>  /  TBili  0.7  /  DBili  x   /  AST  14<L>  /  ALT  17  /  AlkPhos  49  08-22    PT/INR - ( 22 Aug 2021 09:51 )   PT: 13.2 sec;   INR: 1.14 ratio

## 2021-08-22 NOTE — PROGRESS NOTE ADULT - PROBLEM SELECTOR PROBLEM 3
Uncontrolled diabetes mellitus with hyperglycemia
CKD (chronic kidney disease) stage 3, GFR 30-59 ml/min
Uncontrolled diabetes mellitus with hyperglycemia
CKD (chronic kidney disease) stage 3, GFR 30-59 ml/min
CKD (chronic kidney disease) stage 3, GFR 30-59 ml/min

## 2021-08-22 NOTE — PROGRESS NOTE ADULT - PROBLEM SELECTOR PROBLEM 2
Acute respiratory failure with hypoxia
Uncontrolled diabetes mellitus
Uncontrolled diabetes mellitus
Acute respiratory failure with hypoxia
Uncontrolled diabetes mellitus

## 2021-08-22 NOTE — PROGRESS NOTE ADULT - ASSESSMENT
SORAIDA/CKD  COVID19 PNA  Acute respiratory failure  DM  HTN  Hyperkalemia      -Cr 1.35 on 2017 suggest CKDIII baseline. Mild increase in Cr on AM labs, continue to monitor.   -SORAIDA clinically pre-renal. Urine studies suggest pre-renal etiology. However, cannot r/o COVID direct renal injury though less likely.  -Avoid excessive IVF with COVID pulmonary status  -No indication for RRT at this time  -Renal indices are stable, mildly above baseline. Monitor for now  -Change to Low K diet  -Lokelma 10gm x 1

## 2021-08-22 NOTE — PROGRESS NOTE ADULT - ASSESSMENT
62 yo male with PMHs of HTN and T2DM (insulin dependent) presented to the ED complaining SOB and generalized weakness since 08/11/2021. Tested positive for COVID-19 on 08/05/2021. Patient admitted for acute respiratory failure secondary to COVID-19 PNA.    # Acute respiratory failure secondary to COVID-19 PNA  - O2 requirements increased to 3L overnight. will repeat inflammatory markers in AM  - will recommend continued ambulation  - Continue Decadron 6mg day 9/10   - s/p remdesivir course  - supportive measures, emotional support  - AC with heparin 5000 sq q8   - monitor inflammatory markers as clinically indicated  - ID recs noted and appreciated  - this tx plan was formulated utilizing my clinical judgement, currently available local/regional anecdotal information, organizational treatment recommendations with COVID-19 specific considerations given rapidly changing information available     # SORAIDA on CKD, probably prerenal in the setting of dehydration   - stable renal function  - Per chart review, prior creatinine 1.3 in 2017, likely CKDIII   - Nephro (Dr. Vaughan) following, recs appreciated     # DM type 2, uncontrolled  - a1c is 10+, uncontrolled at baseline and now on steroids  - coordinating w/ endocrine q daily  - adjust insulin as warranted w/ hypoglycemic protocol in place  - will increase insulin today  Last 24 hours of POC Glucose checks:   POCT Blood Glucose.: 281 mg/dL (08-22-21 @ 07:24)  POCT Blood Glucose.: 256 mg/dL (08-21-21 @ 21:13)  POCT Blood Glucose.: 235 mg/dL (08-21-21 @ 16:26)  POCT Blood Glucose.: 329 mg/dL (08-21-21 @ 11:42)  A1C with Estimated Average Glucose Result: 10.6 % (08-14-21 @ 10:18)      Thrombocytopenia, resolved  - Likely reactive in the setting of COVID-19 infection    # Hypertension, chronic  - BPs have intermittently been soft but BP trend is stable last 24hrs  - Holding home Edarbyclor 40-12.5 mg qd  (azilsartan/chlorthalidone) due to SORAIDA     # DVT Prophylaxis   - Heparin 5000 SQ q8

## 2021-08-22 NOTE — PROGRESS NOTE ADULT - PROBLEM SELECTOR PROBLEM 4
Hypokalemia
Bradycardia
Hypokalemia
Hypokalemia
Bradycardia

## 2021-08-23 ENCOUNTER — TRANSCRIPTION ENCOUNTER (OUTPATIENT)
Age: 61
End: 2021-08-23

## 2021-08-23 VITALS
OXYGEN SATURATION: 100 % | HEART RATE: 54 BPM | DIASTOLIC BLOOD PRESSURE: 75 MMHG | TEMPERATURE: 98 F | RESPIRATION RATE: 18 BRPM | SYSTOLIC BLOOD PRESSURE: 128 MMHG

## 2021-08-23 LAB
ALBUMIN SERPL ELPH-MCNC: 2.1 G/DL — LOW (ref 3.3–5)
ALBUMIN SERPL ELPH-MCNC: 2.1 G/DL — LOW (ref 3.3–5)
ALP SERPL-CCNC: 50 U/L — SIGNIFICANT CHANGE UP (ref 40–120)
ALP SERPL-CCNC: 51 U/L — SIGNIFICANT CHANGE UP (ref 40–120)
ALT FLD-CCNC: 18 U/L — SIGNIFICANT CHANGE UP (ref 12–78)
ALT FLD-CCNC: 18 U/L — SIGNIFICANT CHANGE UP (ref 12–78)
ANION GAP SERPL CALC-SCNC: 7 MMOL/L — SIGNIFICANT CHANGE UP (ref 5–17)
AST SERPL-CCNC: 14 U/L — LOW (ref 15–37)
AST SERPL-CCNC: 16 U/L — SIGNIFICANT CHANGE UP (ref 15–37)
BASOPHILS # BLD AUTO: 0.02 K/UL — SIGNIFICANT CHANGE UP (ref 0–0.2)
BASOPHILS NFR BLD AUTO: 0.2 % — SIGNIFICANT CHANGE UP (ref 0–2)
BILIRUB DIRECT SERPL-MCNC: 0.2 MG/DL — SIGNIFICANT CHANGE UP (ref 0.05–0.2)
BILIRUB INDIRECT FLD-MCNC: 0.9 MG/DL — SIGNIFICANT CHANGE UP (ref 0.2–1)
BILIRUB SERPL-MCNC: 1.1 MG/DL — SIGNIFICANT CHANGE UP (ref 0.2–1.2)
BILIRUB SERPL-MCNC: 1.1 MG/DL — SIGNIFICANT CHANGE UP (ref 0.2–1.2)
BUN SERPL-MCNC: 26 MG/DL — HIGH (ref 7–23)
CALCIUM SERPL-MCNC: 8.6 MG/DL — SIGNIFICANT CHANGE UP (ref 8.5–10.1)
CHLORIDE SERPL-SCNC: 105 MMOL/L — SIGNIFICANT CHANGE UP (ref 96–108)
CO2 SERPL-SCNC: 23 MMOL/L — SIGNIFICANT CHANGE UP (ref 22–31)
CREAT SERPL-MCNC: 1.3 MG/DL — SIGNIFICANT CHANGE UP (ref 0.5–1.3)
CRP SERPL-MCNC: 13 MG/L — HIGH
D DIMER BLD IA.RAPID-MCNC: 882 NG/ML DDU — HIGH
EOSINOPHIL # BLD AUTO: 0.1 K/UL — SIGNIFICANT CHANGE UP (ref 0–0.5)
EOSINOPHIL NFR BLD AUTO: 1 % — SIGNIFICANT CHANGE UP (ref 0–6)
FERRITIN SERPL-MCNC: 773 NG/ML — HIGH (ref 30–400)
GLUCOSE SERPL-MCNC: 267 MG/DL — HIGH (ref 70–99)
HCT VFR BLD CALC: 32.2 % — LOW (ref 39–50)
HGB BLD-MCNC: 11.5 G/DL — LOW (ref 13–17)
IMM GRANULOCYTES NFR BLD AUTO: 4.9 % — HIGH (ref 0–1.5)
INR BLD: 1.14 RATIO — SIGNIFICANT CHANGE UP (ref 0.88–1.16)
LYMPHOCYTES # BLD AUTO: 0.68 K/UL — LOW (ref 1–3.3)
LYMPHOCYTES # BLD AUTO: 6.5 % — LOW (ref 13–44)
MAGNESIUM SERPL-MCNC: 1.7 MG/DL — SIGNIFICANT CHANGE UP (ref 1.6–2.6)
MCHC RBC-ENTMCNC: 29.5 PG — SIGNIFICANT CHANGE UP (ref 27–34)
MCHC RBC-ENTMCNC: 35.7 GM/DL — SIGNIFICANT CHANGE UP (ref 32–36)
MCV RBC AUTO: 82.6 FL — SIGNIFICANT CHANGE UP (ref 80–100)
MONOCYTES # BLD AUTO: 0.4 K/UL — SIGNIFICANT CHANGE UP (ref 0–0.9)
MONOCYTES NFR BLD AUTO: 3.8 % — SIGNIFICANT CHANGE UP (ref 2–14)
NEUTROPHILS # BLD AUTO: 8.8 K/UL — HIGH (ref 1.8–7.4)
NEUTROPHILS NFR BLD AUTO: 83.6 % — HIGH (ref 43–77)
NRBC # BLD: 0 /100 WBCS — SIGNIFICANT CHANGE UP (ref 0–0)
PHOSPHATE SERPL-MCNC: 3.1 MG/DL — SIGNIFICANT CHANGE UP (ref 2.5–4.5)
PLATELET # BLD AUTO: 206 K/UL — SIGNIFICANT CHANGE UP (ref 150–400)
POTASSIUM SERPL-MCNC: 4.8 MMOL/L — SIGNIFICANT CHANGE UP (ref 3.5–5.3)
POTASSIUM SERPL-SCNC: 4.8 MMOL/L — SIGNIFICANT CHANGE UP (ref 3.5–5.3)
PROCALCITONIN SERPL-MCNC: <0.05 — SIGNIFICANT CHANGE UP (ref 0–0.04)
PROT SERPL-MCNC: 6.1 G/DL — SIGNIFICANT CHANGE UP (ref 6–8.3)
PROT SERPL-MCNC: 6.2 G/DL — SIGNIFICANT CHANGE UP (ref 6–8.3)
PROTHROM AB SERPL-ACNC: 13.2 SEC — SIGNIFICANT CHANGE UP (ref 10.6–13.6)
RBC # BLD: 3.9 M/UL — LOW (ref 4.2–5.8)
RBC # FLD: 14.6 % — HIGH (ref 10.3–14.5)
SODIUM SERPL-SCNC: 135 MMOL/L — SIGNIFICANT CHANGE UP (ref 135–145)
WBC # BLD: 10.52 K/UL — HIGH (ref 3.8–10.5)
WBC # FLD AUTO: 10.52 K/UL — HIGH (ref 3.8–10.5)

## 2021-08-23 PROCEDURE — 82248 BILIRUBIN DIRECT: CPT

## 2021-08-23 PROCEDURE — 84484 ASSAY OF TROPONIN QUANT: CPT

## 2021-08-23 PROCEDURE — 80053 COMPREHEN METABOLIC PANEL: CPT

## 2021-08-23 PROCEDURE — 99239 HOSP IP/OBS DSCHRG MGMT >30: CPT | Mod: GC

## 2021-08-23 PROCEDURE — 86803 HEPATITIS C AB TEST: CPT

## 2021-08-23 PROCEDURE — 83036 HEMOGLOBIN GLYCOSYLATED A1C: CPT

## 2021-08-23 PROCEDURE — 82553 CREATINE MB FRACTION: CPT

## 2021-08-23 PROCEDURE — 84300 ASSAY OF URINE SODIUM: CPT

## 2021-08-23 PROCEDURE — 82728 ASSAY OF FERRITIN: CPT

## 2021-08-23 PROCEDURE — 93005 ELECTROCARDIOGRAM TRACING: CPT

## 2021-08-23 PROCEDURE — 82009 KETONE BODYS QUAL: CPT

## 2021-08-23 PROCEDURE — 71045 X-RAY EXAM CHEST 1 VIEW: CPT

## 2021-08-23 PROCEDURE — 83605 ASSAY OF LACTIC ACID: CPT

## 2021-08-23 PROCEDURE — U0003: CPT

## 2021-08-23 PROCEDURE — 96360 HYDRATION IV INFUSION INIT: CPT

## 2021-08-23 PROCEDURE — 82962 GLUCOSE BLOOD TEST: CPT

## 2021-08-23 PROCEDURE — 85379 FIBRIN DEGRADATION QUANT: CPT

## 2021-08-23 PROCEDURE — 83935 ASSAY OF URINE OSMOLALITY: CPT

## 2021-08-23 PROCEDURE — 84133 ASSAY OF URINE POTASSIUM: CPT

## 2021-08-23 PROCEDURE — 86140 C-REACTIVE PROTEIN: CPT

## 2021-08-23 PROCEDURE — 83880 ASSAY OF NATRIURETIC PEPTIDE: CPT

## 2021-08-23 PROCEDURE — 36415 COLL VENOUS BLD VENIPUNCTURE: CPT

## 2021-08-23 PROCEDURE — 71045 X-RAY EXAM CHEST 1 VIEW: CPT | Mod: 26

## 2021-08-23 PROCEDURE — 85610 PROTHROMBIN TIME: CPT

## 2021-08-23 PROCEDURE — 82803 BLOOD GASES ANY COMBINATION: CPT

## 2021-08-23 PROCEDURE — 84540 ASSAY OF URINE/UREA-N: CPT

## 2021-08-23 PROCEDURE — 84156 ASSAY OF PROTEIN URINE: CPT

## 2021-08-23 PROCEDURE — 82565 ASSAY OF CREATININE: CPT

## 2021-08-23 PROCEDURE — 84100 ASSAY OF PHOSPHORUS: CPT

## 2021-08-23 PROCEDURE — 86769 SARS-COV-2 COVID-19 ANTIBODY: CPT

## 2021-08-23 PROCEDURE — 87040 BLOOD CULTURE FOR BACTERIA: CPT

## 2021-08-23 PROCEDURE — 83930 ASSAY OF BLOOD OSMOLALITY: CPT

## 2021-08-23 PROCEDURE — 87205 SMEAR GRAM STAIN: CPT

## 2021-08-23 PROCEDURE — 85027 COMPLETE CBC AUTOMATED: CPT

## 2021-08-23 PROCEDURE — 85025 COMPLETE CBC W/AUTO DIFF WBC: CPT

## 2021-08-23 PROCEDURE — 83735 ASSAY OF MAGNESIUM: CPT

## 2021-08-23 PROCEDURE — 83615 LACTATE (LD) (LDH) ENZYME: CPT

## 2021-08-23 PROCEDURE — 99285 EMERGENCY DEPT VISIT HI MDM: CPT

## 2021-08-23 PROCEDURE — 80076 HEPATIC FUNCTION PANEL: CPT

## 2021-08-23 PROCEDURE — 84145 PROCALCITONIN (PCT): CPT

## 2021-08-23 PROCEDURE — 82570 ASSAY OF URINE CREATININE: CPT

## 2021-08-23 PROCEDURE — U0005: CPT

## 2021-08-23 PROCEDURE — 94640 AIRWAY INHALATION TREATMENT: CPT

## 2021-08-23 PROCEDURE — 96361 HYDRATE IV INFUSION ADD-ON: CPT

## 2021-08-23 PROCEDURE — 82550 ASSAY OF CK (CPK): CPT

## 2021-08-23 RX ORDER — INSULIN LISPRO 100/ML
6 VIAL (ML) SUBCUTANEOUS
Qty: 200 | Refills: 0
Start: 2021-08-23 | End: 2021-09-21

## 2021-08-23 RX ORDER — ISOPROPYL ALCOHOL, BENZOCAINE .7; .06 ML/ML; ML/ML
1 SWAB TOPICAL
Qty: 100 | Refills: 1
Start: 2021-08-23 | End: 2021-10-11

## 2021-08-23 RX ORDER — AZILSARTAN KAMEDOXOMIL AND CHLORTHALIDONE 40; 12.5 MG/1; MG/1
1 TABLET ORAL
Qty: 0 | Refills: 0 | DISCHARGE

## 2021-08-23 RX ADMIN — Medication 6 UNIT(S): at 11:56

## 2021-08-23 RX ADMIN — Medication 6: at 17:05

## 2021-08-23 RX ADMIN — Medication 4: at 08:14

## 2021-08-23 RX ADMIN — HEPARIN SODIUM 5000 UNIT(S): 5000 INJECTION INTRAVENOUS; SUBCUTANEOUS at 06:20

## 2021-08-23 RX ADMIN — Medication 6: at 11:56

## 2021-08-23 RX ADMIN — Medication 6 MILLIGRAM(S): at 06:20

## 2021-08-23 RX ADMIN — Medication 6 UNIT(S): at 17:05

## 2021-08-23 RX ADMIN — INSULIN GLARGINE 10 UNIT(S): 100 INJECTION, SOLUTION SUBCUTANEOUS at 11:55

## 2021-08-23 RX ADMIN — Medication 6 UNIT(S): at 08:14

## 2021-08-23 NOTE — CONSULT NOTE ADULT - ASSESSMENT
Physical Exam:   Vital Signs Last 24 Hrs  T(C): 36.7 (23 Aug 2021 13:11), Max: 36.8 (23 Aug 2021 04:33)  T(F): 98 (23 Aug 2021 13:11), Max: 98.3 (23 Aug 2021 04:33)  HR: 56 (23 Aug 2021 13:11) (42 - 62)  BP: 97/60 (23 Aug 2021 13:11) (97/60 - 125/77)  BP(mean): --  RR: 17 (23 Aug 2021 13:11) (17 - 17)  SpO2: 99% (23 Aug 2021 13:11) (94% - 99%)    General: NAD, denies Fever, chills         eGFR if Non African American: 59 mL/min/1.73M2 (08-23-21 @ 09:44)  eGFR if : 68 mL/min/1.73M2 (08-23-21 @ 09:44)  eGFR if Non African American: 50 mL/min/1.73M2 (08-22-21 @ 09:51)  eGFR if : 57 mL/min/1.73M2 (08-22-21 @ 09:51)  eGFR if Non African American: 54 mL/min/1.73M2 (08-21-21 @ 08:27)  eGFR if : 62 mL/min/1.73M2 (08-21-21 @ 08:27)      CAPILLARY BLOOD GLUCOSE      POCT Blood Glucose.: 269 mg/dL (23 Aug 2021 11:52)  POCT Blood Glucose.: 237 mg/dL (23 Aug 2021 08:06)  POCT Blood Glucose.: 273 mg/dL (22 Aug 2021 21:04)  POCT Blood Glucose.: 236 mg/dL (22 Aug 2021 16:24)      Cholesterol, Serum: 113 mg/dL (05.19.21 @ 08:36)     HDL Cholesterol, Serum: 22 mg/dL (05.19.21 @ 08:36)     LDL Cholesterol Calculated: 66 mg/dL (05.19.21 @ 08:36)     DIET: CC

## 2021-08-23 NOTE — CONSULT NOTE ADULT - PROBLEM SELECTOR RECOMMENDATION 9
Type  A1c % s/p  Recommend endocrine-Perlman on consult  FU appt: TBA  DSC recommendations: glucose monitoring before meals and at bedtime, suggested Freestyle Jannet sensor (gave information to discuss with his MD.)  REcommended endocrine follow up-gave him a list of names to call.  Continue Levemir 25units qhs, short acting ie humalog 6units with each meal.   diabetes education provided-importance of taking medication as ordered and regular follow up with his MD.     Goal 100-180 mg/dL; 140-180 mg/dL in critical care areas
cont lantus 25 units 2x/day  increase admelog 12 units 3x/day before meals  cont mod dose admelog scale coverage qac/qhs  cont cons cho diet  goal bg 100-180 in hosp setting
Type 2  A1c 10.6% adm Covid +  Recommend endocrine-Perlman on consult  FU appt: TBA  DSC recommendations: return to home regimen including/adding rapid acting insulin premeal and glucose monitoring  diabetes education provided  Goal 100-180 mg/dL; 140-180 mg/dL in critical care areas

## 2021-08-23 NOTE — DISCHARGE NOTE NURSING/CASE MANAGEMENT/SOCIAL WORK - PATIENT PORTAL LINK FT
You can access the FollowMyHealth Patient Portal offered by [facility] [place] by registering at the following website: [email or web address]. By joining [facility]’s FollowMyHealth portal, you will also be able to view your health information using other applications (apps) compatible with our system.

## 2021-08-23 NOTE — PROGRESS NOTE ADULT - PROVIDER SPECIALTY LIST ADULT
Endocrinology
Infectious Disease
Nephrology
Hospitalist
Infectious Disease
Nephrology
Endocrinology
Endocrinology
Infectious Disease
Nephrology
Hospitalist
Nephrology
Nephrology
Endocrinology
Hospitalist
Hospitalist
Endocrinology
Hospitalist

## 2021-08-23 NOTE — PROGRESS NOTE ADULT - SUBJECTIVE AND OBJECTIVE BOX
CAPILLARY BLOOD GLUCOSE      POCT Blood Glucose.: 273 mg/dL (22 Aug 2021 21:04)  POCT Blood Glucose.: 236 mg/dL (22 Aug 2021 16:24)  POCT Blood Glucose.: 314 mg/dL (22 Aug 2021 11:23)      Vital Signs Last 24 Hrs  T(C): 36.8 (23 Aug 2021 04:33), Max: 36.8 (23 Aug 2021 04:33)  T(F): 98.3 (23 Aug 2021 04:33), Max: 98.3 (23 Aug 2021 04:33)  HR: 61 (23 Aug 2021 04:33) (42 - 61)  BP: 116/77 (23 Aug 2021 04:33) (97/62 - 125/77)  BP(mean): --  RR: 17 (23 Aug 2021 04:33) (17 - 18)  SpO2: 94% (23 Aug 2021 04:33) (94% - 99%)       08-22    136  |  107  |  29<H>  ----------------------------<  297<H>  5.4<H>   |  21<L>  |  1.50<H>    Ca    8.8      22 Aug 2021 09:51  Phos  3.1     08-22  Mg     1.9     08-22    TPro  6.2  /  Alb  2.0<L>  /  TBili  0.7  /  DBili  .20  /  AST  14<L>  /  ALT  17  /  AlkPhos  49  08-22      dextrose 40% Gel 15 Gram(s) Oral once  dextrose 50% Injectable 25 Gram(s) IV Push once  dextrose 50% Injectable 12.5 Gram(s) IV Push once  dextrose 50% Injectable 25 Gram(s) IV Push once  glucagon  Injectable 1 milliGRAM(s) IntraMuscular once  insulin glargine Injectable (LANTUS) 10 Unit(s) SubCutaneous every morning  insulin glargine Injectable (LANTUS) 10 Unit(s) SubCutaneous at bedtime  insulin lispro (ADMELOG) corrective regimen sliding scale   SubCutaneous three times a day before meals  insulin lispro (ADMELOG) corrective regimen sliding scale   SubCutaneous at bedtime  insulin lispro Injectable (ADMELOG) 6 Unit(s) SubCutaneous three times a day before meals

## 2021-08-23 NOTE — DISCHARGE NOTE NURSING/CASE MANAGEMENT/SOCIAL WORK - NSDCPEFALRISK_GEN_ALL_CORE
For information on Fall & injury Prevention, visit [email or web address]

## 2021-08-23 NOTE — PROGRESS NOTE ADULT - PROBLEM SELECTOR PROBLEM 1
Uncontrolled diabetes mellitus with hyperglycemia
Uncontrolled diabetes mellitus with hyperglycemia
Pneumonia due to COVID-19 virus
Uncontrolled diabetes mellitus with hyperglycemia
DKA (diabetic ketoacidoses)
Uncontrolled diabetes mellitus with hyperglycemia
DKA (diabetic ketoacidoses)
Uncontrolled diabetes mellitus with hyperglycemia
Pneumonia due to COVID-19 virus
DKA (diabetic ketoacidoses)

## 2021-08-23 NOTE — CONSULT NOTE ADULT - SUBJECTIVE AND OBJECTIVE BOX
Patient is a 61y old  Male who presents with a chief complaint of acute hypoxic respiratory failure (23 Aug 2021 13:27)    Type:2 diagnosed in 2015. No known complications SeesPCP, no endocrine for diabetes.  Unsure of his last visit. States he only  takes Levemir 50units at night.  Has never been on meal time insulin. Inconsistently checking glucose.  Will need supplies as he will need mealtime coverage as well as basal insulin.  HPI:  60 yo Male patient with PMHx of HTN, DM type 2 (insulin dependent) presents today complaining SOB and generalized weakness since 2021. Pt endorses that came back from [place] on  and tested positive for COVID-19, states he was in contact with a friend that tested positive. Since 2021 has been progressively worsening  generalized weakness and exertional dyspnea, associated with cough, decreased appetite with poor oral intake, decreases smell and one episode of vomiting today. Patient before was able to walk long distances, climb stairs w/o dyspna  At this time he denies  fever, chills, chest pain, palpitations, abdominal pain, diarrhea, constipation, urinary frequency, urgency, hematuria, hematochezia, melena or dysuria, changes in vision, dizziness, numbness, tingling. He is not vaccinated against covid19    ED course: VS  Afebrile, HR: 68, BP: 113/59, SpO2: 94%-> 98% 3 NC RR: 19  Labs significant for Neutrophils 84% D-dimer 323 Procalcitonin 0.41, Na 132 BUN/creatinine: 84/2.80, Glucose 427 GFR: 27, lactate 2.3. AB.4/30/103/22/30.0  Patient received 1 Lt NS bolus in the ED  (13 Aug 2021 19:56)      PAST MEDICAL & SURGICAL HISTORY:  Hypertension    Diabetes mellitus    No significant past surgical history        REVIEW OF SYSTEMS  General:	as above  Eye: no blurry vison  Respiratory: NAD, No SOB, no cough  Cardiovascular: No chest pain, no palpitations	  Gastrointestinal:	No nausea, vomiting, no abdominal pain  Endocrine: no polyuria, no polydipsia, or S/S of hypoglycemia  Neuro: denies numbness, no tingling	      Allergies    calcium channel blockers (Other)    Intolerances        MEDICATIONS  (STANDING):  dextrose 40% Gel 15 Gram(s) Oral once  dextrose 5%. 1000 milliLiter(s) (50 mL/Hr) IV Continuous <Continuous>  dextrose 5%. 1000 milliLiter(s) (100 mL/Hr) IV Continuous <Continuous>  dextrose 50% Injectable 25 Gram(s) IV Push once  dextrose 50% Injectable 12.5 Gram(s) IV Push once  dextrose 50% Injectable 25 Gram(s) IV Push once  glucagon  Injectable 1 milliGRAM(s) IntraMuscular once  heparin   Injectable 5000 Unit(s) SubCutaneous every 8 hours  insulin glargine Injectable (LANTUS) 10 Unit(s) SubCutaneous every morning  insulin glargine Injectable (LANTUS) 10 Unit(s) SubCutaneous at bedtime  insulin lispro (ADMELOG) corrective regimen sliding scale   SubCutaneous three times a day before meals  insulin lispro (ADMELOG) corrective regimen sliding scale   SubCutaneous at bedtime  insulin lispro Injectable (ADMELOG) 6 Unit(s) SubCutaneous three times a day before meals

## 2021-08-23 NOTE — PROGRESS NOTE ADULT - PROBLEM SELECTOR PLAN 1
cont lantus 12 units daily  cont mod dose admelog scale coverage qac/qhs  add admelog 6 units 3x/day before meals  cont cons cho diet  goal bg 100-180 in hosp setting
lantus increased 10 units 2x/day  cont admelog 6 units 3x/day before meals  cont mod dose admelog scale coverage qac/qhs  cont cons cho diet  goal bg 100-180 in hosp setting
cont lantus 25 units 2x/day  cont admelog 12 units 3x/day before meals  cont mod dose admelog scale coverage qac/qhs  cont cons cho diet  goal bg 100-180 in hosp setting
lantus dose reduced in light of am hypoglycemia  standing pre-meal admelog d/andrew  cont mod dose admelog scale coverage qac/qhs  to resume standing pre-meal insulin at reduced dosages if bg numbers rise  cont cons cho diet  goal bg 100-180 in hosp setting
cont lantus 25 units 2x/day  cont admelog 12 units 3x/day before meals  cont mod dose admelog scale coverage qac/qhs  cont cons cho diet  goal bg 100-180 in hosp setting

## 2021-08-23 NOTE — CONSULT NOTE ADULT - PROBLEM SELECTOR PROBLEM 1
Uncontrolled diabetes mellitus with hyperglycemia

## 2021-08-23 NOTE — PROGRESS NOTE ADULT - SUBJECTIVE AND OBJECTIVE BOX
Patient is a 61y old  Male who presents with a chief complaint of acute hypoxic respiratory failure (13 Aug 2021 19:56)       HPI:  60 yo Male patient with PMHx of HTN, DM type 2 (insulin dependent) presents today complaining SOB and generalized weakness since 08/11/2021. Pt endorses that came back from [place] on August 5th and tested positive for COVID-19, states he was in contact with a friend that tested positive. Since 08/11/2021 has been progressively worsening  generalized weakness and exertional dyspnea, associated with cough, decreased appetite with poor oral intake, decreases smell and one episode of vomiting today. Patient before was able to walk long distances, climb stairs w/o dyspna  At this time he denies  fever, chills, chest pain, palpitations, abdominal pain, diarrhea, constipation, urinary frequency, urgency, hematuria, hematochezia, melena or dysuria, changes in vision, dizziness, numbness, tingling. He is not vaccinated against covid19  Renal is being consulted for SORAIDA/CKD. No urinary c/o.       INTERVAL HISTORY:   No acute overnight events. Pt with no urinary or other complaints this morning. States that SOB is improving.     PAST MEDICAL & SURGICAL HISTORY:  Hypertension    Diabetes mellitus    No significant past surgical history         FAMILY HISTORY:  FH: HTN (hypertension) (Father, Mother)    NC    Social History:Non smoker    MEDICATIONS  (STANDING):  dextrose 40% Gel 15 Gram(s) Oral once  dextrose 5%. 1000 milliLiter(s) (50 mL/Hr) IV Continuous <Continuous>  dextrose 5%. 1000 milliLiter(s) (100 mL/Hr) IV Continuous <Continuous>  dextrose 50% Injectable 25 Gram(s) IV Push once  dextrose 50% Injectable 12.5 Gram(s) IV Push once  dextrose 50% Injectable 25 Gram(s) IV Push once  glucagon  Injectable 1 milliGRAM(s) IntraMuscular once  heparin   Injectable 5000 Unit(s) SubCutaneous every 8 hours  insulin glargine Injectable (LANTUS) 10 Unit(s) SubCutaneous every morning  insulin glargine Injectable (LANTUS) 10 Unit(s) SubCutaneous at bedtime  insulin lispro (ADMELOG) corrective regimen sliding scale   SubCutaneous three times a day before meals  insulin lispro (ADMELOG) corrective regimen sliding scale   SubCutaneous at bedtime  insulin lispro Injectable (ADMELOG) 6 Unit(s) SubCutaneous three times a day before meals    MEDICATIONS  (PRN):  acetaminophen   Tablet .. 650 milliGRAM(s) Oral every 6 hours PRN Temp greater or equal to 38.5C (101.3F), Mild Pain (1 - 3)  ondansetron Injectable 4 milliGRAM(s) IV Push every 8 hours PRN Nausea and/or Vomiting      Allergies    calcium channel blockers (Other)    Intolerances     REVIEW OF SYSTEMS:    CONSTITUTIONAL:  No weight loss   EYES: No eye pain, visual disturbances, or discharge  ENMT:  No difficulty hearing, tinnitus, vertigo; No sinus or throat pain  NECK: No pain or stiffness  RESPIRATORY: +SOB; No wheeze.  CARDIOVASCULAR: No chest pain, palpitations, dizziness,   GASTROINTESTINAL: No abdominal or epigastric pain. No nausea, vomiting, or hematemesis; No diarrhea or constipation. No melena or hematochezia.  GENITOURINARY: No dysuria, frequency, hematuria, or incontinence  NEUROLOGICAL: No headaches, memory loss, loss of strength, numbness, or tremors  LYMPH NODES: No enlarged glands  MUSCULOSKELETAL: No joint pain or swelling       I&O's Summary    22 Aug 2021 07:01  -  23 Aug 2021 07:00  --------------------------------------------------------  IN: 100 mL / OUT: 1500 mL / NET: -1400 mL        PHYSICAL EXAM:  Vital Signs Last 24 Hrs  T(C): 36.7 (23 Aug 2021 13:11), Max: 36.8 (23 Aug 2021 04:33)  T(F): 98 (23 Aug 2021 13:11), Max: 98.3 (23 Aug 2021 04:33)  HR: 56 (23 Aug 2021 13:11) (42 - 62)  BP: 97/60 (23 Aug 2021 13:11) (97/60 - 125/77)  BP(mean): --  RR: 17 (23 Aug 2021 13:11) (17 - 17)  SpO2: 99% (23 Aug 2021 13:11) (94% - 99%)      Exam deferred due to COVID 19 isolation and reduce risk of transmission      LABS:                        11.5   10.52 )-----------( 206      ( 23 Aug 2021 09:44 )             32.2     08-23    135  |  105  |  26<H>  ----------------------------<  267<H>  4.8   |  23  |  1.30    Ca    8.6      23 Aug 2021 09:44  Phos  3.1     08-23  Mg     1.7     08-23    TPro  6.2  /  Alb  2.1<L>  /  TBili  1.1  /  DBili  .20  /  AST  16  /  ALT  18  /  AlkPhos  50  08-23    PT/INR - ( 23 Aug 2021 09:44 )   PT: 13.2 sec;   INR: 1.14 ratio

## 2021-08-23 NOTE — CONSULT NOTE ADULT - CONSULT REASON
dm2 uncontrolled
COVID
61y A1C with Estimated Average Glucose Result: 10.6 % (08-14-21 @ 10:18)   diabetes mellitus uncontrolled type 2
SORAIDA/CKD
61y A1C with Estimated Average Glucose Result: 10.6 % (08-14-21 @ 10:18)   diabetes mellitus uncontrolled type 2

## 2022-04-24 NOTE — PATIENT PROFILE ADULT. - AS [facility]
Occupational Therapy -12T  Patient not seen in therapy.     Re-attempt plan: tomorrow    Per BUCK Ryan, pt very sleepy this afternoon but appropriate to try for therapy.  Writer attempted to see pt for OT treatment session. Two family members in room and requested that writer wake pt to attempt therapy session.  Pt opened eyes and responded to writer. Pt declined participation in therapy today due to fatigue.                             (4) no impairment

## 2022-06-13 NOTE — PROGRESS NOTE ADULT - REASON FOR ADMISSION
acute hypoxic respiratory failure
BMI:   HbA1c:   Glucose:   BP: 110/78 (06-12-22 @ 04:00) (110/78 - 156/78)  Lipid Panel:

## 2023-07-12 NOTE — CHART NOTE - NSCHARTNOTEFT_GEN_A_CORE
57 yo male, PMHx HTN, NIDDM formerly on Metformin, now diet and exercise controlled, presented referred to ED by PMD for critically high blood sugars in office. Pt went to PMD with complaints of 1-2 weeks of increased fatigue, polydipsia, polyuria, abdominal pain, cramping in hands and feet, and weight loss. Pt checked his home finger stick last week, glucose > 400, so he began taking his left over Metformin. Upon arrival to ED, found to have glucose > 600, with persistently elevated blood sugar despite multiple doses of insulin and IV fluids. Pt was acidotic to 7.27 with an anion gap of 16, trace serum ketones. Pt transferred to MICU for treatment with continuous insulin infusion. Euglycemia obtained, patient transitioned off insulin infusion to Lantus, sliding scale coverage, and is tolerating a diet. Endocrine consult obtained. Patient also with hypokalemia, which was repleated. Case discussed with Dr. Talavera who will resume his medical care.
Yes

## 2024-01-26 NOTE — ED ADULT NURSE NOTE - CAS TRG GEN SKIN COLOR
Stop apixaban  Work on calorie counting and weight loss  Reschedule the echocardiogram previously ordered  
Normal for race

## 2024-02-01 NOTE — CONSULT NOTE ADULT - SUBJECTIVE AND OBJECTIVE BOX
HPI:  56 year old male with history of HTN and T2DM admitted with mild DKA (glucose 612).  He reported symptoms of polyuria, muscle cramps, fatigue with decreased ability to exercise, weight loss of 12-15 pounds over the past 3-4 weeks with glucoses reading "hi" on meter (he was also using  strips).  He was treated with IVF and insulin drip.      He stated that in  he passed out and asmitted to [facility] with glucose 1500's.  At that time he was started on MFN, which he took for 6-8 months but taken off with normalization of HbA1c levels.  He has not been on MFN for the past 2 years but admitted to taking 1 dose earlier this week when he tested his sugar.  I reveiwed his Contour meter and glucoses steadily increased from 2017 till now - initially glucoses 150 then increased to 200-300's in March/April and the 500+ in May.    He feels better. He has never seen an ophthalmologist.  He denies diabetic microvascular disease.  Diet - he is vegetarian and eats breakfast and dinner on most days.  He occasionally eats junk food.  Exercise - he exercises regularly - both weight and CV exercise    He is anxious to leave hospital as he has a flight tomorrow to [place] to attend grandson's graduation from high school.      PAST MEDICAL & SURGICAL HISTORY:  Hypertension  Right inguinal hernia repair  Vocal cord polyp removal 3/2017      FAMILY HISTORY: no family history of diabetes      SOCIAL HISTORY: denie EtOH/illicit drugs/tobacco    REVIEW OF SYSTEMS:    Constitutional: No fever, no chills, (+) weight loss    Eyes: (+) blurred vision since     Lungs: No shortness of breath, no wheezing, no cough    CV: No chest pain, no palpitations, no pain with walking.    GI: No nausea, no vomiting, no constipation, no diarrhea, no abdominal pain    : (+) polyuria    Musculoskeletal: (+) muscle spasm and cramps - improved    Skin: No rash, no infections.    Neurologic: No no burning or pain in feet    Endocrine: (+) polydipsia    Psych: No depression, no anxiety, no trouble concentrating    Outpt med: edarbi. OTC vitamin D3    MEDICATIONS  (STANDING):  enoxaparin Injectable 40milliGRAM(s) SubCutaneous every 24 hours  insulin glargine Injectable (LANTUS) 22Unit(s) SubCutaneous every morning  insulin lispro (HumaLOG) corrective regimen sliding scale  SubCutaneous three times a day before meals  insulin lispro (HumaLOG) corrective regimen sliding scale  SubCutaneous at bedtime  dextrose 5%. 1000milliLiter(s) IV Continuous <Continuous>  dextrose 50% Injectable 12.5Gram(s) IV Push once  dextrose 50% Injectable 25Gram(s) IV Push once  dextrose 50% Injectable 25Gram(s) IV Push once    MEDICATIONS  (PRN):  dextrose Gel 1Dose(s) Oral once PRN Blood Glucose LESS THAN 70 milliGRAM(s)/deciLiter  glucagon  Injectable 1milliGRAM(s) IntraMuscular once PRN Glucose <70 milliGRAM(s)/deciLiter      Allergies  calcium channel blockers (Other)    PHYSICAL EXAM:    Vital Signs Last 24 Hrs  T(C): 37, Max: 37 ( @ 07:39)  T(F): 98.6, Max: 98.6 ( @ 07:39)  HR: 59 (50 - 65)  BP: 117/69 (95/54 - 122/77)  BP(mean): 75 (69 - 88)  RR: 18 (13 - 21)  SpO2: 100% (100% - 100%)    General appearance: Well developed, well nourished.    Eyes: Pupils equalt. EOMI. No exophthalmos.    Neck: Trachea midline. No thyroid enlargement. No palpable thyroid nodules    Lungs: Normal respiratory excursion. Lungs clear.    CV: Regular cardiac rhythm. No murmur. 2+ pedal pulses intact.    Abdomen: Soft, non tender, no organomegaly or mass. (+) umbilical hernia (since birth, per pt)    Musculoskeletal: No cyanosis, clubbing, or edema. No pedal lesions.    Skin: Warm and moist. No rash. No acanthosis. Feet no ulcers    Neuro: Cranial nerves intact. Normal motor and sensory function. DTR's normal.    Psych: Normal affect, good judgement.            LABS:                        11.8   3.9   )-----------( 116      ( 17 May 2017 08:58 )             33.1         136  |  105  |  17.0  ----------------------------<  292<H>  4.7   |  22.0  |  1.35<H>    Ca    8.9      17 May 2017 08:55  Phos  2.5       Mg     2.1         TPro  7.6  /  Alb  4.3  /  TBili  1.9  /  DBili  x   /  AST  26  /  ALT  21  /  AlkPhos  69  05-15    Urinalysis Basic - ( 15 May 2017 20:35 )    Color: Yellow / Appearance: Clear / S.010 / pH: x  Gluc: x / Ketone: Trace  / Bili: Negative / Urobili: Negative mg/dL   Blood: x / Protein: Negative mg/dL / Nitrite: Negative   Leuk Esterase: Negative / RBC: x / WBC x   Sq Epi: x / Non Sq Epi: x / Bacteria: x      LIVER FUNCTIONS - ( 15 May 2017 20:17 )  Alb: 4.3 g/dL / Pro: 7.6 g/dL / ALK PHOS: 69 U/L / ALT: 21 U/L / AST: 26 U/L / GGT: x           Hemoglobin A1C, Whole Blood: 10.9 % ( @ 07:10)        CAPILLARY BLOOD GLUCOSE  277 (17 May 2017 07:54)  339 (16 May 2017 22:22)  141 (16 May 2017 16:37)  94 (16 May 2017 13:00)  108 (16 May 2017 12:00)  148 (16 May 2017 11:00)  227 (16 May 2017 10:00)  271 (16 May 2017 09:00)  308 (16 May 2017 08:00)  310 (16 May 2017 07:00)  295 (16 May 2017 06:11)  304 (16 May 2017 02:39)  234 (15 May 2017 23:41)  345 (15 May 2017 21:39) [id number]
